# Patient Record
Sex: FEMALE | Race: OTHER | HISPANIC OR LATINO | Employment: OTHER | URBAN - METROPOLITAN AREA
[De-identification: names, ages, dates, MRNs, and addresses within clinical notes are randomized per-mention and may not be internally consistent; named-entity substitution may affect disease eponyms.]

---

## 2017-10-18 ENCOUNTER — HOSPITAL ENCOUNTER (OUTPATIENT)
Dept: RADIOLOGY | Age: 57
Discharge: HOME | End: 2017-10-18
Payer: COMMERCIAL

## 2017-10-18 DIAGNOSIS — Z12.31 ENCOUNTER FOR SCREENING MAMMOGRAM FOR MALIGNANT NEOPLASM OF BREAST: ICD-10-CM

## 2017-10-18 PROCEDURE — G0202 SCR MAMMO BI INCL CAD: HCPCS

## 2017-10-22 ENCOUNTER — APPOINTMENT (OUTPATIENT)
Dept: LAB | Age: 57
End: 2017-10-22
Payer: COMMERCIAL

## 2017-10-22 ENCOUNTER — TRANSCRIBE ORDERS (OUTPATIENT)
Dept: ADMINISTRATIVE | Age: 57
End: 2017-10-22

## 2017-10-22 DIAGNOSIS — E78.5 HYPERLIPIDEMIA, UNSPECIFIED HYPERLIPIDEMIA TYPE: Primary | ICD-10-CM

## 2017-10-22 DIAGNOSIS — E78.5 HYPERLIPIDEMIA, UNSPECIFIED HYPERLIPIDEMIA TYPE: ICD-10-CM

## 2017-10-22 DIAGNOSIS — E03.9 MYXEDEMA HEART DISEASE: ICD-10-CM

## 2017-10-22 DIAGNOSIS — E55.9 AVITAMINOSIS D: ICD-10-CM

## 2017-10-22 DIAGNOSIS — I51.9 MYXEDEMA HEART DISEASE: ICD-10-CM

## 2017-10-22 LAB
25(OH)D3 SERPL-MCNC: 37.9 NG/ML (ref 30–100)
ALBUMIN SERPL BCP-MCNC: 3.7 G/DL (ref 3.5–5)
ALP SERPL-CCNC: 60 U/L (ref 46–116)
ALT SERPL W P-5'-P-CCNC: 27 U/L (ref 12–78)
ANION GAP SERPL CALCULATED.3IONS-SCNC: 7 MMOL/L (ref 4–13)
AST SERPL W P-5'-P-CCNC: 16 U/L (ref 5–45)
BILIRUB SERPL-MCNC: 0.43 MG/DL (ref 0.2–1)
BUN SERPL-MCNC: 19 MG/DL (ref 5–25)
CALCIUM SERPL-MCNC: 8.8 MG/DL (ref 8.3–10.1)
CHLORIDE SERPL-SCNC: 108 MMOL/L (ref 100–108)
CHOLEST SERPL-MCNC: 163 MG/DL (ref 50–200)
CO2 SERPL-SCNC: 27 MMOL/L (ref 21–32)
CREAT SERPL-MCNC: 0.72 MG/DL (ref 0.6–1.3)
ERYTHROCYTE [DISTWIDTH] IN BLOOD BY AUTOMATED COUNT: 12.5 % (ref 11.6–15.1)
GFR SERPL CREATININE-BSD FRML MDRD: 93 ML/MIN/1.73SQ M
GLUCOSE P FAST SERPL-MCNC: 82 MG/DL (ref 65–99)
HCT VFR BLD AUTO: 38.5 % (ref 34.8–46.1)
HDLC SERPL-MCNC: 70 MG/DL (ref 40–60)
HGB BLD-MCNC: 13.2 G/DL (ref 11.5–15.4)
LDLC SERPL CALC-MCNC: 68 MG/DL (ref 0–100)
MCH RBC QN AUTO: 30.6 PG (ref 26.8–34.3)
MCHC RBC AUTO-ENTMCNC: 34.3 G/DL (ref 31.4–37.4)
MCV RBC AUTO: 89 FL (ref 82–98)
PLATELET # BLD AUTO: 261 THOUSANDS/UL (ref 149–390)
PMV BLD AUTO: 10.5 FL (ref 8.9–12.7)
POTASSIUM SERPL-SCNC: 4 MMOL/L (ref 3.5–5.3)
PROT SERPL-MCNC: 7.5 G/DL (ref 6.4–8.2)
RBC # BLD AUTO: 4.31 MILLION/UL (ref 3.81–5.12)
SODIUM SERPL-SCNC: 142 MMOL/L (ref 136–145)
TRIGL SERPL-MCNC: 127 MG/DL
TSH SERPL DL<=0.05 MIU/L-ACNC: 1.14 UIU/ML (ref 0.36–3.74)
VIT B12 SERPL-MCNC: 4058 PG/ML (ref 100–900)
WBC # BLD AUTO: 6.01 THOUSAND/UL (ref 4.31–10.16)

## 2017-10-22 PROCEDURE — 36415 COLL VENOUS BLD VENIPUNCTURE: CPT

## 2017-10-22 PROCEDURE — 80053 COMPREHEN METABOLIC PANEL: CPT

## 2017-10-22 PROCEDURE — 80061 LIPID PANEL: CPT

## 2017-10-22 PROCEDURE — 82607 VITAMIN B-12: CPT

## 2017-10-22 PROCEDURE — 85027 COMPLETE CBC AUTOMATED: CPT | Performed by: INTERNAL MEDICINE

## 2017-10-22 PROCEDURE — 82306 VITAMIN D 25 HYDROXY: CPT

## 2017-10-22 PROCEDURE — 84443 ASSAY THYROID STIM HORMONE: CPT

## 2017-10-26 ENCOUNTER — HOSPITAL ENCOUNTER (OUTPATIENT)
Dept: ULTRASOUND IMAGING | Facility: CLINIC | Age: 57
Discharge: HOME | End: 2017-10-26
Payer: COMMERCIAL

## 2017-10-26 ENCOUNTER — HOSPITAL ENCOUNTER (OUTPATIENT)
Dept: MAMMOGRAPHY | Facility: CLINIC | Age: 57
Discharge: HOME | End: 2017-10-26
Payer: COMMERCIAL

## 2017-10-26 DIAGNOSIS — R92.8 ABNORMAL MAMMOGRAM: ICD-10-CM

## 2017-10-26 PROCEDURE — G0279 TOMOSYNTHESIS, MAMMO: HCPCS

## 2017-10-26 PROCEDURE — G0206 DX MAMMO INCL CAD UNI: HCPCS

## 2019-02-20 ENCOUNTER — TRANSCRIBE ORDERS (OUTPATIENT)
Dept: ADMINISTRATIVE | Facility: HOSPITAL | Age: 59
End: 2019-02-20

## 2019-02-20 DIAGNOSIS — Z12.31 ENCOUNTER FOR SCREENING MAMMOGRAM FOR MALIGNANT NEOPLASM OF BREAST: Primary | ICD-10-CM

## 2019-03-13 ENCOUNTER — HOSPITAL ENCOUNTER (OUTPATIENT)
Dept: RADIOLOGY | Age: 59
Discharge: HOME | End: 2019-03-13
Payer: COMMERCIAL

## 2019-03-13 VITALS — HEIGHT: 60 IN | BODY MASS INDEX: 30.63 KG/M2 | WEIGHT: 156 LBS

## 2019-03-13 DIAGNOSIS — Z12.31 ENCOUNTER FOR SCREENING MAMMOGRAM FOR MALIGNANT NEOPLASM OF BREAST: ICD-10-CM

## 2019-03-13 PROCEDURE — 77063 BREAST TOMOSYNTHESIS BI: CPT

## 2019-03-13 PROCEDURE — 77067 SCR MAMMO BI INCL CAD: CPT

## 2020-01-17 ENCOUNTER — APPOINTMENT (OUTPATIENT)
Dept: LAB | Facility: CLINIC | Age: 60
End: 2020-01-17
Payer: COMMERCIAL

## 2020-01-17 ENCOUNTER — TRANSCRIBE ORDERS (OUTPATIENT)
Dept: LAB | Facility: CLINIC | Age: 60
End: 2020-01-17

## 2020-01-17 DIAGNOSIS — E78.5 HYPERLIPIDEMIA, UNSPECIFIED HYPERLIPIDEMIA TYPE: ICD-10-CM

## 2020-01-17 DIAGNOSIS — E55.9 AVITAMINOSIS D: ICD-10-CM

## 2020-01-17 DIAGNOSIS — I51.9 MYXEDEMA HEART DISEASE: ICD-10-CM

## 2020-01-17 DIAGNOSIS — E03.9 MYXEDEMA HEART DISEASE: Primary | ICD-10-CM

## 2020-01-17 DIAGNOSIS — G51.0 BELL'S PALSY: ICD-10-CM

## 2020-01-17 DIAGNOSIS — E03.9 MYXEDEMA HEART DISEASE: ICD-10-CM

## 2020-01-17 DIAGNOSIS — I51.9 MYXEDEMA HEART DISEASE: Primary | ICD-10-CM

## 2020-01-17 LAB
ALBUMIN SERPL BCP-MCNC: 3.6 G/DL (ref 3.5–5)
ALP SERPL-CCNC: 62 U/L (ref 46–116)
ALT SERPL W P-5'-P-CCNC: 32 U/L (ref 12–78)
ANION GAP SERPL CALCULATED.3IONS-SCNC: 4 MMOL/L (ref 4–13)
AST SERPL W P-5'-P-CCNC: 14 U/L (ref 5–45)
BILIRUB SERPL-MCNC: 0.41 MG/DL (ref 0.2–1)
BUN SERPL-MCNC: 16 MG/DL (ref 5–25)
CALCIUM SERPL-MCNC: 9.1 MG/DL (ref 8.3–10.1)
CHLORIDE SERPL-SCNC: 111 MMOL/L (ref 100–108)
CHOLEST SERPL-MCNC: 173 MG/DL (ref 50–200)
CO2 SERPL-SCNC: 27 MMOL/L (ref 21–32)
CREAT SERPL-MCNC: 0.65 MG/DL (ref 0.6–1.3)
ERYTHROCYTE [DISTWIDTH] IN BLOOD BY AUTOMATED COUNT: 12.9 % (ref 11.6–15.1)
GFR SERPL CREATININE-BSD FRML MDRD: 97 ML/MIN/1.73SQ M
GLUCOSE P FAST SERPL-MCNC: 88 MG/DL (ref 65–99)
HCT VFR BLD AUTO: 41.4 % (ref 34.8–46.1)
HDLC SERPL-MCNC: 64 MG/DL
HGB BLD-MCNC: 13.4 G/DL (ref 11.5–15.4)
LDLC SERPL CALC-MCNC: 87 MG/DL (ref 0–100)
MCH RBC QN AUTO: 30.2 PG (ref 26.8–34.3)
MCHC RBC AUTO-ENTMCNC: 32.4 G/DL (ref 31.4–37.4)
MCV RBC AUTO: 93 FL (ref 82–98)
NONHDLC SERPL-MCNC: 109 MG/DL
PLATELET # BLD AUTO: 268 THOUSANDS/UL (ref 149–390)
PMV BLD AUTO: 10.5 FL (ref 8.9–12.7)
POTASSIUM SERPL-SCNC: 4.6 MMOL/L (ref 3.5–5.3)
PROT SERPL-MCNC: 7.4 G/DL (ref 6.4–8.2)
RBC # BLD AUTO: 4.44 MILLION/UL (ref 3.81–5.12)
SODIUM SERPL-SCNC: 142 MMOL/L (ref 136–145)
TRIGL SERPL-MCNC: 112 MG/DL
TSH SERPL DL<=0.05 MIU/L-ACNC: 1.57 UIU/ML (ref 0.36–3.74)
VIT B12 SERPL-MCNC: 668 PG/ML (ref 100–900)
WBC # BLD AUTO: 5.58 THOUSAND/UL (ref 4.31–10.16)

## 2020-01-17 PROCEDURE — 85027 COMPLETE CBC AUTOMATED: CPT

## 2020-01-17 PROCEDURE — 36415 COLL VENOUS BLD VENIPUNCTURE: CPT

## 2020-01-17 PROCEDURE — 80053 COMPREHEN METABOLIC PANEL: CPT

## 2020-01-17 PROCEDURE — 82607 VITAMIN B-12: CPT

## 2020-01-17 PROCEDURE — 82652 VIT D 1 25-DIHYDROXY: CPT

## 2020-01-17 PROCEDURE — 84443 ASSAY THYROID STIM HORMONE: CPT

## 2020-01-17 PROCEDURE — 80061 LIPID PANEL: CPT

## 2020-01-21 LAB — 1,25(OH)2D3 SERPL-MCNC: 79.4 PG/ML (ref 19.9–79.3)

## 2020-01-28 ENCOUNTER — TRANSCRIBE ORDERS (OUTPATIENT)
Dept: RADIOLOGY | Facility: HOSPITAL | Age: 60
End: 2020-01-28

## 2020-01-28 ENCOUNTER — HOSPITAL ENCOUNTER (OUTPATIENT)
Dept: RADIOLOGY | Facility: HOSPITAL | Age: 60
Discharge: HOME | End: 2020-01-28
Payer: COMMERCIAL

## 2020-01-28 DIAGNOSIS — M50.91: ICD-10-CM

## 2020-01-28 DIAGNOSIS — M50.91: Primary | ICD-10-CM

## 2020-01-28 PROCEDURE — 72050 X-RAY EXAM NECK SPINE 4/5VWS: CPT

## 2020-03-17 ENCOUNTER — HOSPITAL ENCOUNTER (OUTPATIENT)
Dept: RADIOLOGY | Age: 60
Discharge: HOME | End: 2020-03-17
Payer: COMMERCIAL

## 2020-03-17 VITALS — BODY MASS INDEX: 32.59 KG/M2 | HEIGHT: 60 IN | WEIGHT: 166 LBS

## 2020-03-17 DIAGNOSIS — Z12.31 ENCOUNTER FOR SCREENING MAMMOGRAM FOR MALIGNANT NEOPLASM OF BREAST: ICD-10-CM

## 2020-03-17 PROCEDURE — 77063 BREAST TOMOSYNTHESIS BI: CPT

## 2020-03-17 PROCEDURE — 77067 SCR MAMMO BI INCL CAD: CPT

## 2020-06-24 ENCOUNTER — TRANSCRIBE ORDERS (OUTPATIENT)
Dept: ADMINISTRATIVE | Age: 60
End: 2020-06-24

## 2020-06-24 ENCOUNTER — APPOINTMENT (OUTPATIENT)
Dept: LAB | Age: 60
End: 2020-06-24
Payer: COMMERCIAL

## 2020-06-24 DIAGNOSIS — E89.41 SYMPTOMATIC POSTPROCEDURAL OVARIAN FAILURE: Primary | ICD-10-CM

## 2020-06-24 DIAGNOSIS — E89.41 SYMPTOMATIC POSTPROCEDURAL OVARIAN FAILURE: ICD-10-CM

## 2020-06-24 LAB — ESTRADIOL SERPL-MCNC: 57 PG/ML

## 2020-06-24 PROCEDURE — 82670 ASSAY OF TOTAL ESTRADIOL: CPT

## 2020-06-24 PROCEDURE — 36415 COLL VENOUS BLD VENIPUNCTURE: CPT

## 2020-06-24 PROCEDURE — 84403 ASSAY OF TOTAL TESTOSTERONE: CPT

## 2020-06-24 PROCEDURE — 84402 ASSAY OF FREE TESTOSTERONE: CPT

## 2020-06-26 LAB
TESTOST FREE SERPL-MCNC: 0.8 PG/ML (ref 0–4.2)
TESTOST SERPL-MCNC: 62 NG/DL (ref 3–41)

## 2021-01-19 ENCOUNTER — TRANSCRIBE ORDERS (OUTPATIENT)
Dept: ADMINISTRATIVE | Age: 61
End: 2021-01-19

## 2021-01-19 ENCOUNTER — LAB (OUTPATIENT)
Dept: LAB | Age: 61
End: 2021-01-19
Payer: COMMERCIAL

## 2021-01-19 DIAGNOSIS — I51.9 MYXEDEMA HEART DISEASE: Primary | ICD-10-CM

## 2021-01-19 DIAGNOSIS — E03.9 MYXEDEMA HEART DISEASE: ICD-10-CM

## 2021-01-19 DIAGNOSIS — E55.9 AVITAMINOSIS D: ICD-10-CM

## 2021-01-19 DIAGNOSIS — E78.5 HYPERLIPIDEMIA, UNSPECIFIED HYPERLIPIDEMIA TYPE: ICD-10-CM

## 2021-01-19 DIAGNOSIS — I51.9 MYXEDEMA HEART DISEASE: ICD-10-CM

## 2021-01-19 DIAGNOSIS — G51.0 BELL'S PALSY: ICD-10-CM

## 2021-01-19 DIAGNOSIS — D51.0 PERNICIOUS ANEMIA: ICD-10-CM

## 2021-01-19 DIAGNOSIS — E03.9 MYXEDEMA HEART DISEASE: Primary | ICD-10-CM

## 2021-01-19 LAB
25(OH)D3 SERPL-MCNC: 18.9 NG/ML (ref 30–100)
ALBUMIN SERPL BCP-MCNC: 3.6 G/DL (ref 3.5–5)
ALP SERPL-CCNC: 67 U/L (ref 46–116)
ALT SERPL W P-5'-P-CCNC: 24 U/L (ref 12–78)
ANION GAP SERPL CALCULATED.3IONS-SCNC: 4 MMOL/L (ref 4–13)
AST SERPL W P-5'-P-CCNC: 16 U/L (ref 5–45)
BILIRUB SERPL-MCNC: 0.44 MG/DL (ref 0.2–1)
BUN SERPL-MCNC: 15 MG/DL (ref 5–25)
CALCIUM SERPL-MCNC: 9.2 MG/DL (ref 8.3–10.1)
CHLORIDE SERPL-SCNC: 112 MMOL/L (ref 100–108)
CHOLEST SERPL-MCNC: 170 MG/DL (ref 50–200)
CO2 SERPL-SCNC: 26 MMOL/L (ref 21–32)
CREAT SERPL-MCNC: 0.66 MG/DL (ref 0.6–1.3)
ERYTHROCYTE [DISTWIDTH] IN BLOOD BY AUTOMATED COUNT: 11.9 % (ref 11.6–15.1)
GFR SERPL CREATININE-BSD FRML MDRD: 96 ML/MIN/1.73SQ M
GLUCOSE P FAST SERPL-MCNC: 83 MG/DL (ref 65–99)
HCT VFR BLD AUTO: 39.4 % (ref 34.8–46.1)
HDLC SERPL-MCNC: 56 MG/DL
HGB BLD-MCNC: 12.9 G/DL (ref 11.5–15.4)
LDLC SERPL CALC-MCNC: 81 MG/DL (ref 0–100)
MCH RBC QN AUTO: 30.8 PG (ref 26.8–34.3)
MCHC RBC AUTO-ENTMCNC: 32.7 G/DL (ref 31.4–37.4)
MCV RBC AUTO: 94 FL (ref 82–98)
NONHDLC SERPL-MCNC: 114 MG/DL
PLATELET # BLD AUTO: 245 THOUSANDS/UL (ref 149–390)
PMV BLD AUTO: 10.7 FL (ref 8.9–12.7)
POTASSIUM SERPL-SCNC: 4.4 MMOL/L (ref 3.5–5.3)
PROT SERPL-MCNC: 7.2 G/DL (ref 6.4–8.2)
RBC # BLD AUTO: 4.19 MILLION/UL (ref 3.81–5.12)
SODIUM SERPL-SCNC: 142 MMOL/L (ref 136–145)
TRIGL SERPL-MCNC: 167 MG/DL
TSH SERPL DL<=0.05 MIU/L-ACNC: 4.18 UIU/ML (ref 0.36–3.74)
VIT B12 SERPL-MCNC: 428 PG/ML (ref 100–900)
WBC # BLD AUTO: 5.47 THOUSAND/UL (ref 4.31–10.16)

## 2021-01-19 PROCEDURE — 80053 COMPREHEN METABOLIC PANEL: CPT

## 2021-01-19 PROCEDURE — 85027 COMPLETE CBC AUTOMATED: CPT

## 2021-01-19 PROCEDURE — 82607 VITAMIN B-12: CPT

## 2021-01-19 PROCEDURE — 80061 LIPID PANEL: CPT

## 2021-01-19 PROCEDURE — 82306 VITAMIN D 25 HYDROXY: CPT

## 2021-01-19 PROCEDURE — 84443 ASSAY THYROID STIM HORMONE: CPT

## 2021-03-10 DIAGNOSIS — Z23 ENCOUNTER FOR IMMUNIZATION: ICD-10-CM

## 2021-05-19 ENCOUNTER — TRANSCRIBE ORDERS (OUTPATIENT)
Dept: ADMINISTRATIVE | Age: 61
End: 2021-05-19

## 2021-05-19 ENCOUNTER — APPOINTMENT (OUTPATIENT)
Dept: LAB | Age: 61
End: 2021-05-19
Payer: COMMERCIAL

## 2021-05-19 DIAGNOSIS — E55.9 AVITAMINOSIS D: ICD-10-CM

## 2021-05-19 DIAGNOSIS — I51.9 MYXEDEMA HEART DISEASE: ICD-10-CM

## 2021-05-19 DIAGNOSIS — E03.9 MYXEDEMA HEART DISEASE: ICD-10-CM

## 2021-05-19 DIAGNOSIS — E55.9 AVITAMINOSIS D: Primary | ICD-10-CM

## 2021-05-19 LAB
25(OH)D3 SERPL-MCNC: 20.8 NG/ML (ref 30–100)
TSH SERPL DL<=0.05 MIU/L-ACNC: 2.46 UIU/ML (ref 0.36–3.74)

## 2021-05-19 PROCEDURE — 82306 VITAMIN D 25 HYDROXY: CPT

## 2021-05-19 PROCEDURE — 84443 ASSAY THYROID STIM HORMONE: CPT

## 2021-05-19 PROCEDURE — 36415 COLL VENOUS BLD VENIPUNCTURE: CPT

## 2021-07-21 ENCOUNTER — HOSPITAL ENCOUNTER (OUTPATIENT)
Dept: RADIOLOGY | Age: 61
Discharge: HOME | End: 2021-07-21
Payer: COMMERCIAL

## 2021-07-21 VITALS — HEIGHT: 60 IN | WEIGHT: 166 LBS | BODY MASS INDEX: 32.59 KG/M2

## 2021-07-21 DIAGNOSIS — Z12.31 ENCOUNTER FOR SCREENING MAMMOGRAM FOR MALIGNANT NEOPLASM OF BREAST: ICD-10-CM

## 2021-07-21 PROCEDURE — 77063 BREAST TOMOSYNTHESIS BI: CPT

## 2021-07-21 PROCEDURE — 77067 SCR MAMMO BI INCL CAD: CPT

## 2021-10-03 ENCOUNTER — APPOINTMENT (OUTPATIENT)
Dept: LAB | Age: 61
End: 2021-10-03
Payer: COMMERCIAL

## 2021-10-03 DIAGNOSIS — E03.9 ACQUIRED HYPOTHYROIDISM: ICD-10-CM

## 2021-10-03 LAB — TSH SERPL DL<=0.05 MIU/L-ACNC: 0.93 UIU/ML (ref 0.36–3.74)

## 2021-10-03 PROCEDURE — 36415 COLL VENOUS BLD VENIPUNCTURE: CPT

## 2021-10-03 PROCEDURE — 84443 ASSAY THYROID STIM HORMONE: CPT

## 2021-10-17 ENCOUNTER — APPOINTMENT (OUTPATIENT)
Dept: LAB | Age: 61
End: 2021-10-17
Payer: COMMERCIAL

## 2021-10-17 DIAGNOSIS — E89.41 SYMPTOMATIC STATES ASSOCIATED WITH ARTIFICIAL MENOPAUSE: ICD-10-CM

## 2021-10-17 LAB — ESTRADIOL SERPL-MCNC: 60 PG/ML

## 2021-10-17 PROCEDURE — 84403 ASSAY OF TOTAL TESTOSTERONE: CPT

## 2021-10-17 PROCEDURE — 84402 ASSAY OF FREE TESTOSTERONE: CPT

## 2021-10-17 PROCEDURE — 82670 ASSAY OF TOTAL ESTRADIOL: CPT

## 2021-10-17 PROCEDURE — 36415 COLL VENOUS BLD VENIPUNCTURE: CPT

## 2021-10-18 LAB
TESTOST FREE SERPL-MCNC: 0.9 PG/ML (ref 0–4.2)
TESTOST SERPL-MCNC: 52 NG/DL (ref 3–67)

## 2021-11-14 ENCOUNTER — LAB (OUTPATIENT)
Dept: LAB | Age: 61
End: 2021-11-14
Payer: COMMERCIAL

## 2021-11-14 DIAGNOSIS — Z01.818 PRE-OP TESTING: ICD-10-CM

## 2021-11-14 LAB
ANION GAP SERPL CALCULATED.3IONS-SCNC: 3 MMOL/L (ref 4–13)
BASOPHILS # BLD AUTO: 0.07 THOUSANDS/ΜL (ref 0–0.1)
BASOPHILS NFR BLD AUTO: 1 % (ref 0–1)
BUN SERPL-MCNC: 15 MG/DL (ref 5–25)
CALCIUM SERPL-MCNC: 9.1 MG/DL (ref 8.3–10.1)
CHLORIDE SERPL-SCNC: 107 MMOL/L (ref 100–108)
CO2 SERPL-SCNC: 27 MMOL/L (ref 21–32)
CREAT SERPL-MCNC: 0.69 MG/DL (ref 0.6–1.3)
EOSINOPHIL # BLD AUTO: 0.19 THOUSAND/ΜL (ref 0–0.61)
EOSINOPHIL NFR BLD AUTO: 3 % (ref 0–6)
ERYTHROCYTE [DISTWIDTH] IN BLOOD BY AUTOMATED COUNT: 11.9 % (ref 11.6–15.1)
GFR SERPL CREATININE-BSD FRML MDRD: 94 ML/MIN/1.73SQ M
GLUCOSE P FAST SERPL-MCNC: 86 MG/DL (ref 65–99)
HCT VFR BLD AUTO: 41.3 % (ref 34.8–46.1)
HGB BLD-MCNC: 13.6 G/DL (ref 11.5–15.4)
IMM GRANULOCYTES # BLD AUTO: 0.03 THOUSAND/UL (ref 0–0.2)
IMM GRANULOCYTES NFR BLD AUTO: 1 % (ref 0–2)
LYMPHOCYTES # BLD AUTO: 1.53 THOUSANDS/ΜL (ref 0.6–4.47)
LYMPHOCYTES NFR BLD AUTO: 25 % (ref 14–44)
MCH RBC QN AUTO: 31.3 PG (ref 26.8–34.3)
MCHC RBC AUTO-ENTMCNC: 32.9 G/DL (ref 31.4–37.4)
MCV RBC AUTO: 95 FL (ref 82–98)
MONOCYTES # BLD AUTO: 0.68 THOUSAND/ΜL (ref 0.17–1.22)
MONOCYTES NFR BLD AUTO: 11 % (ref 4–12)
NEUTROPHILS # BLD AUTO: 3.58 THOUSANDS/ΜL (ref 1.85–7.62)
NEUTS SEG NFR BLD AUTO: 59 % (ref 43–75)
NRBC BLD AUTO-RTO: 0 /100 WBCS
PLATELET # BLD AUTO: 299 THOUSANDS/UL (ref 149–390)
PMV BLD AUTO: 10.5 FL (ref 8.9–12.7)
POTASSIUM SERPL-SCNC: 5.1 MMOL/L (ref 3.5–5.3)
RBC # BLD AUTO: 4.35 MILLION/UL (ref 3.81–5.12)
SODIUM SERPL-SCNC: 137 MMOL/L (ref 136–145)
WBC # BLD AUTO: 6.08 THOUSAND/UL (ref 4.31–10.16)

## 2021-11-14 PROCEDURE — 80048 BASIC METABOLIC PNL TOTAL CA: CPT

## 2021-11-14 PROCEDURE — 93005 ELECTROCARDIOGRAM TRACING: CPT

## 2021-11-14 PROCEDURE — 36415 COLL VENOUS BLD VENIPUNCTURE: CPT

## 2021-11-14 PROCEDURE — 85025 COMPLETE CBC W/AUTO DIFF WBC: CPT

## 2021-11-15 LAB
ATRIAL RATE: 62 BPM
P AXIS: 43 DEGREES
PR INTERVAL: 178 MS
QRS AXIS: 40 DEGREES
QRSD INTERVAL: 82 MS
QT INTERVAL: 438 MS
QTC INTERVAL: 444 MS
T WAVE AXIS: 2 DEGREES
VENTRICULAR RATE: 62 BPM

## 2021-11-15 PROCEDURE — 93010 ELECTROCARDIOGRAM REPORT: CPT | Performed by: INTERNAL MEDICINE

## 2021-11-30 ENCOUNTER — LAB REQUISITION (OUTPATIENT)
Dept: LAB | Facility: HOSPITAL | Age: 61
End: 2021-11-30
Payer: COMMERCIAL

## 2021-11-30 DIAGNOSIS — J34.89 OTHER SPECIFIED DISORDERS OF NOSE AND NASAL SINUSES: ICD-10-CM

## 2021-11-30 PROCEDURE — 88305 TISSUE EXAM BY PATHOLOGIST: CPT | Performed by: PATHOLOGY

## 2022-01-26 ENCOUNTER — OFFICE VISIT (OUTPATIENT)
Dept: PODIATRY | Facility: CLINIC | Age: 62
End: 2022-01-26
Payer: COMMERCIAL

## 2022-01-26 VITALS
BODY MASS INDEX: 32.22 KG/M2 | SYSTOLIC BLOOD PRESSURE: 145 MMHG | HEIGHT: 60 IN | DIASTOLIC BLOOD PRESSURE: 87 MMHG | HEART RATE: 86 BPM

## 2022-01-26 DIAGNOSIS — M77.42 METATARSALGIA OF BOTH FEET: ICD-10-CM

## 2022-01-26 DIAGNOSIS — M77.41 METATARSALGIA OF BOTH FEET: ICD-10-CM

## 2022-01-26 DIAGNOSIS — M21.42 PES PLANUS OF BOTH FEET: Primary | ICD-10-CM

## 2022-01-26 DIAGNOSIS — M21.41 PES PLANUS OF BOTH FEET: Primary | ICD-10-CM

## 2022-01-26 DIAGNOSIS — M21.962 FOOT DEFORMITY, BILATERAL: ICD-10-CM

## 2022-01-26 DIAGNOSIS — M21.961 FOOT DEFORMITY, BILATERAL: ICD-10-CM

## 2022-01-26 PROCEDURE — 99243 OFF/OP CNSLTJ NEW/EST LOW 30: CPT | Performed by: PODIATRIST

## 2022-01-26 RX ORDER — GLUCOSAMINE/D3/BOSWELLIA SERRA 1500MG-400
TABLET ORAL
COMMUNITY
End: 2022-06-23 | Stop reason: ALTCHOICE

## 2022-01-26 RX ORDER — CETIRIZINE HYDROCHLORIDE 10 MG/1
TABLET ORAL
COMMUNITY
End: 2022-06-23 | Stop reason: ALTCHOICE

## 2022-01-26 RX ORDER — MULTIVIT WITH MINERALS/LUTEIN
TABLET ORAL
COMMUNITY
End: 2022-06-23 | Stop reason: ALTCHOICE

## 2022-01-26 NOTE — PROGRESS NOTES
Assessment/Plan:         Diagnoses and all orders for this visit:    Pes planus of both feet  -     Ambulatory referral to Physical Therapy; Future    Metatarsalgia of both feet  -     Ambulatory referral to Physical Therapy; Future    Foot deformity, bilateral  -     Ambulatory referral to Physical Therapy; Future    Other orders  -     cetirizine (Allergy, Cetirizine,) 10 mg tablet; cetirizine 10 mg tablet   TAKE 1 TABLET BY MOUTH ONCE DAILY FOR 30 DAYS  -     Ascorbic Acid (vitamin C) 1000 MG tablet  -     Biotin 21274 MCG TABS      Diagnosis and options discussed with patient  Patient agreeable to the plan as stated below      Patient has progressive pes planovalgus right worse than left  Most of her pain is in the midfoot and forefoot  At this point she has no posterior tibial tendon pain or weakness  I suggested a custom orthotic in her shoe to better externally supported her foot deformity  I think a lot of aching pain she gets throughout her feet is from the overpronation and flatfoot deformity  Prescription given to get custom-molded orthotics  I would like to see her after wearing them for 3 months  Subjective:      Patient ID: Esaw Najjar is a 64 y o  female  Patient presents with pain for years  In the last year it has gotten much worse  The right foot is worse than the left  Sometimes she can barely stand  There is swelling on and off  She wears an arch support band which sort of helps  She gets severe pain in her medial heel and arch, across the base of the toes, along the 5th metatarsal, basically everywhere  The left foot pain is in the same locations but much less severe  She is an active walker but this pain is stopping her from doing activity  The following portions of the patient's history were reviewed and updated as appropriate:   She  has a past medical history of Cervical cancer ([address] ) and GERD (gastroesophageal reflux disease)    She There are no problems to display for this patient  She  has a past surgical history that includes Hysterectomy; Oophorectomy (Bilateral); Breast biopsy (Right);  section (1986); Urethral sling (2010); Gastric bypass (2011); and Nose surgery  Her family history includes Breast cancer in her maternal grandmother; No Known Problems in her daughter, father, maternal aunt, maternal aunt, maternal aunt, maternal aunt, maternal aunt, maternal aunt, maternal aunt, maternal aunt, maternal aunt, maternal grandfather, mother, paternal aunt, paternal aunt, paternal aunt, paternal aunt, paternal aunt, paternal aunt, paternal grandfather, paternal grandmother, and sister; Thyroid disease in her family  She  reports that she has never smoked  She has never used smokeless tobacco  No history on file for alcohol use and drug use  Current Outpatient Medications   Medication Sig Dispense Refill    Ascorbic Acid (vitamin C) 1000 MG tablet       B Complex-C (SUPER B COMPLEX PO)       Biotin 88457 MCG TABS       cetirizine (Allergy, Cetirizine,) 10 mg tablet cetirizine 10 mg tablet   TAKE 1 TABLET BY MOUTH ONCE DAILY FOR 30 DAYS      cyanocobalamin (VITAMIN B-12) 1000 MCG tablet Take 1 tablet by mouth      Estradiol 6 MG PLLT       levocetirizine (Xyzal) 5 MG tablet Daily      magnesium oxide (MAG-OX) 400 mg tablet Take 400 mg by mouth      Multiple Vitamins-Minerals (ONE-A-DAY 50 PLUS PO)       Testosterone 50 MG PLLT       famotidine (PEPCID) 10 mg tablet famotidine   1 tablet by mouth before breakfast      famotidine (PEPCID) 20 mg tablet famotidine 20 mg tablet      levothyroxine 75 mcg tablet Take 75 mcg by mouth daily       No current facility-administered medications for this visit       Current Outpatient Medications on File Prior to Visit   Medication Sig    Ascorbic Acid (vitamin C) 1000 MG tablet     B Complex-C (SUPER B COMPLEX PO)     Biotin 45792 MCG TABS     cetirizine (Allergy, Cetirizine,) 10 mg tablet cetirizine 10 mg tablet   TAKE 1 TABLET BY MOUTH ONCE DAILY FOR 30 DAYS    cyanocobalamin (VITAMIN B-12) 1000 MCG tablet Take 1 tablet by mouth    Estradiol 6 MG PLLT     levocetirizine (Xyzal) 5 MG tablet Daily    magnesium oxide (MAG-OX) 400 mg tablet Take 400 mg by mouth    Multiple Vitamins-Minerals (ONE-A-DAY 50 PLUS PO)     Testosterone 50 MG PLLT     famotidine (PEPCID) 10 mg tablet famotidine   1 tablet by mouth before breakfast    famotidine (PEPCID) 20 mg tablet famotidine 20 mg tablet    levothyroxine 75 mcg tablet Take 75 mcg by mouth daily     No current facility-administered medications on file prior to visit  She is allergic to latex       Review of Systems   Constitutional: Negative  HENT: Negative  Respiratory: Negative  Gastrointestinal: Negative  Musculoskeletal: Positive for arthralgias, gait problem and joint swelling  Skin: Negative for color change and wound  Neurological: Negative for weakness, light-headedness, numbness and headaches  Psychiatric/Behavioral: The patient is not nervous/anxious  Objective:      /87   Pulse 86   Ht 5' (1 524 m) Comment: verbal  BMI 32 22 kg/m²          Physical Exam    Vitals reviewed    Constitutional: Patient is not distressed  Patient is well developed  Patient is obese  Vascular: Dorsalis pedis and posterior tibial pulses palpable  Capillary refill time within normal limits to all digits  No erythema  No edema  No significant varicosities  Dermatology: No rash  No open lesions  Present pedal hair  Skin has healthy turgor  Musculoskeletal:   Right lower legs and the Etelvina Hem shows significant collapsing pes planovalgus with calcaneal resting stance position of 7-10 degrees  She is able to perform single heel raise without posterior tibial tendon pain or dysfunction  She has over pronated during gait        On palpation she has tenderness throughout the medial arch but not plantar fascia insertion  She has midfoot and metatarsophalangeal joint dorsal tenderness  There is pain along the 5th metatarsal and styloid  No peroneal weakness  No extensor tendon weakness  Normal Achilles strength  The right is more significant than the left both in deformity and symptoms pain  Neurological: Monofilament sensation is intact  Vibratory sensation is intact  Achilles reflex is normal    Proprioception is normal    Respiratory: Normal respiratory effort, no distress    Psych: Patient is AAOx3  Normal mood       Lymphatic: nonpalpable popliteal lymph nodes  Nonpalpable groin lymph nodes

## 2022-01-26 NOTE — LETTER
January 26, 2022     [facility] [address]  [place] [address]    Patient: Caprice Born   YOB: 1960   Date of Visit: 1/26/2022       Dear Dr Levi Newsome: Thank you for referring Caprice Born to me for evaluation  Below are my notes for this consultation  If you have questions, please do not hesitate to call me  I look forward to following your patient along with you  Sincerely,        Colby Rodriguez DPM        CC: No Recipients  Sameer Mayes  1/26/2022 11:49 AM  Signed  Assessment/Plan:         Diagnoses and all orders for this visit:    Pes planus of both feet  -     Ambulatory referral to Physical Therapy; Future    Metatarsalgia of both feet  -     Ambulatory referral to Physical Therapy; Future    Foot deformity, bilateral  -     Ambulatory referral to Physical Therapy; Future    Other orders  -     cetirizine (Allergy, Cetirizine,) 10 mg tablet; cetirizine 10 mg tablet   TAKE 1 TABLET BY MOUTH ONCE DAILY FOR 30 DAYS  -     Ascorbic Acid (vitamin C) 1000 MG tablet  -     Biotin 02621 MCG TABS      Diagnosis and options discussed with patient  Patient agreeable to the plan as stated below      Patient has progressive pes planovalgus right worse than left  Most of her pain is in the midfoot and forefoot  At this point she has no posterior tibial tendon pain or weakness  I suggested a custom orthotic in her shoe to better externally supported her foot deformity  I think a lot of aching pain she gets throughout her feet is from the overpronation and flatfoot deformity  Prescription given to get custom-molded orthotics  I would like to see her after wearing them for 3 months  Subjective:      Patient ID: Caprice Born is a 64 y o  female  Patient presents with pain for years  In the last year it has gotten much worse  The right foot is worse than the left  Sometimes she can barely stand  There is swelling on and off   She wears an arch support band which sort of helps  She gets severe pain in her medial heel and arch, across the base of the toes, along the 5th metatarsal, basically everywhere  The left foot pain is in the same locations but much less severe  She is an active walker but this pain is stopping her from doing activity  The following portions of the patient's history were reviewed and updated as appropriate:   She  has a past medical history of Cervical cancer ([address] ) and GERD (gastroesophageal reflux disease)  She There are no problems to display for this patient  She  has a past surgical history that includes Hysterectomy; Oophorectomy (Bilateral); Breast biopsy (Right);  section (1986); Urethral sling (2010); Gastric bypass (2011); and Nose surgery  Her family history includes Breast cancer in her maternal grandmother; No Known Problems in her daughter, father, maternal aunt, maternal aunt, maternal aunt, maternal aunt, maternal aunt, maternal aunt, maternal aunt, maternal aunt, maternal aunt, maternal grandfather, mother, paternal aunt, paternal aunt, paternal aunt, paternal aunt, paternal aunt, paternal aunt, paternal grandfather, paternal grandmother, and sister; Thyroid disease in her family  She  reports that she has never smoked  She has never used smokeless tobacco  No history on file for alcohol use and drug use    Current Outpatient Medications   Medication Sig Dispense Refill    Ascorbic Acid (vitamin C) 1000 MG tablet       B Complex-C (SUPER B COMPLEX PO)       Biotin 78391 MCG TABS       cetirizine (Allergy, Cetirizine,) 10 mg tablet cetirizine 10 mg tablet   TAKE 1 TABLET BY MOUTH ONCE DAILY FOR 30 DAYS      cyanocobalamin (VITAMIN B-12) 1000 MCG tablet Take 1 tablet by mouth      Estradiol 6 MG PLLT       levocetirizine (Xyzal) 5 MG tablet Daily      magnesium oxide (MAG-OX) 400 mg tablet Take 400 mg by mouth      Multiple Vitamins-Minerals (ONE-A-DAY 50 PLUS PO)       Testosterone 50 MG PLLT       famotidine (PEPCID) 10 mg tablet famotidine   1 tablet by mouth before breakfast      famotidine (PEPCID) 20 mg tablet famotidine 20 mg tablet      levothyroxine 75 mcg tablet Take 75 mcg by mouth daily       No current facility-administered medications for this visit  Current Outpatient Medications on File Prior to Visit   Medication Sig    Ascorbic Acid (vitamin C) 1000 MG tablet     B Complex-C (SUPER B COMPLEX PO)     Biotin 13604 MCG TABS     cetirizine (Allergy, Cetirizine,) 10 mg tablet cetirizine 10 mg tablet   TAKE 1 TABLET BY MOUTH ONCE DAILY FOR 30 DAYS    cyanocobalamin (VITAMIN B-12) 1000 MCG tablet Take 1 tablet by mouth    Estradiol 6 MG PLLT     levocetirizine (Xyzal) 5 MG tablet Daily    magnesium oxide (MAG-OX) 400 mg tablet Take 400 mg by mouth    Multiple Vitamins-Minerals (ONE-A-DAY 50 PLUS PO)     Testosterone 50 MG PLLT     famotidine (PEPCID) 10 mg tablet famotidine   1 tablet by mouth before breakfast    famotidine (PEPCID) 20 mg tablet famotidine 20 mg tablet    levothyroxine 75 mcg tablet Take 75 mcg by mouth daily     No current facility-administered medications on file prior to visit  She is allergic to latex       Review of Systems   Constitutional: Negative  HENT: Negative  Respiratory: Negative  Gastrointestinal: Negative  Musculoskeletal: Positive for arthralgias, gait problem and joint swelling  Skin: Negative for color change and wound  Neurological: Negative for weakness, light-headedness, numbness and headaches  Psychiatric/Behavioral: The patient is not nervous/anxious  Objective:      /87   Pulse 86   Ht 5' (1 524 m) Comment: verbal  BMI 32 22 kg/m²          Physical Exam    Vitals reviewed    Constitutional: Patient is not distressed  Patient is well developed  Patient is obese  Vascular: Dorsalis pedis and posterior tibial pulses palpable     Capillary refill time within normal limits to all digits  No erythema  No edema  No significant varicosities  Dermatology: No rash  No open lesions  Present pedal hair  Skin has healthy turgor  Musculoskeletal:   Right lower legs and the [place]e Gums shows significant collapsing pes planovalgus with calcaneal resting stance position of 7-10 degrees  She is able to perform single heel raise without posterior tibial tendon pain or dysfunction  She has over pronated during gait  On palpation she has tenderness throughout the medial arch but not plantar fascia insertion  She has midfoot and metatarsophalangeal joint dorsal tenderness  There is pain along the 5th metatarsal and styloid  No peroneal weakness  No extensor tendon weakness  Normal Achilles strength  The right is more significant than the left both in deformity and symptoms pain  Neurological: Monofilament sensation is intact  Vibratory sensation is intact  Achilles reflex is normal    Proprioception is normal    Respiratory: Normal respiratory effort, no distress    Psych: Patient is AAOx3  Normal mood       Lymphatic: nonpalpable popliteal lymph nodes  Nonpalpable groin lymph nodes

## 2022-02-07 ENCOUNTER — EVALUATION (OUTPATIENT)
Dept: PHYSICAL THERAPY | Facility: CLINIC | Age: 62
End: 2022-02-07
Payer: COMMERCIAL

## 2022-02-07 DIAGNOSIS — M21.961 FOOT DEFORMITY, BILATERAL: ICD-10-CM

## 2022-02-07 DIAGNOSIS — M21.41 PES PLANUS OF BOTH FEET: ICD-10-CM

## 2022-02-07 DIAGNOSIS — M21.962 FOOT DEFORMITY, BILATERAL: ICD-10-CM

## 2022-02-07 DIAGNOSIS — M21.42 PES PLANUS OF BOTH FEET: ICD-10-CM

## 2022-02-07 DIAGNOSIS — M77.41 METATARSALGIA OF BOTH FEET: ICD-10-CM

## 2022-02-07 DIAGNOSIS — M77.42 METATARSALGIA OF BOTH FEET: ICD-10-CM

## 2022-02-07 PROCEDURE — 97161 PT EVAL LOW COMPLEX 20 MIN: CPT | Performed by: PHYSICAL THERAPIST

## 2022-02-07 NOTE — PROGRESS NOTES
PT Evaluation     Today's date: 2022  Patient name: Nnamdi Aceves  : 1960  MRN: [id number]  Referring provider: ALINE Livingston  Dx:   Encounter Diagnosis     ICD-10-CM    1  Pes planus of both feet  M21 41 Ambulatory referral to Physical Therapy    M21 42    2  Metatarsalgia of both feet  M77 41 Ambulatory referral to Physical Therapy    M77 42    3  Foot deformity, bilateral  M21 961 Ambulatory referral to Physical Therapy    M21 962                   Assessment/Plan  Pt should benefit from custom orthotics  Goals:  Fit orthotics  IP in break in period  Assure good shoe fit    Return for one more visit in 2-4 weeks to fit orthotics  Subjective Evaluation    History of Present Illness  Mechanism of injury: Pt presents for custom orthotics due to bilat foot pain (R>L)  Pain is in MLA, sinus tarsi and lat border of midfoot  She is a retired principal   She started walking more once she retired and noticed increased foot pain  She stopped walking for exercise about 5 mo  Ago which helped to decrease her symptoms but she reported weight gain    She has worn OTC inserts and tries to purchase supportive sneakers for walking    Pain  Current pain ratin  At best pain ratin  At worst pain ratin    Patient Goals  Patient goals for therapy: decreased pain          Objective  Mod pes planus L > R  Mild hallux valgus L > R  RCSP 5 deg ev bilat    Gait:  R ER  Moderated midfoot collapse  Minimal calcaneal eversion    Neutral FF, PF'd 1st ray L    Scanned for custom orthotics     Precautions: none      Manuals                                                                 Neuro Re-Ed                                                                                                        Ther Ex                                                                                                                     Ther Activity                                       Gait Training Modalities

## 2022-02-10 ENCOUNTER — OFFICE VISIT (OUTPATIENT)
Dept: BARIATRICS | Facility: CLINIC | Age: 62
End: 2022-02-10
Payer: COMMERCIAL

## 2022-02-10 VITALS
RESPIRATION RATE: 16 BRPM | HEIGHT: 61 IN | WEIGHT: 170.5 LBS | SYSTOLIC BLOOD PRESSURE: 125 MMHG | HEART RATE: 83 BPM | DIASTOLIC BLOOD PRESSURE: 83 MMHG | TEMPERATURE: 97.3 F | BODY MASS INDEX: 32.19 KG/M2

## 2022-02-10 DIAGNOSIS — K91.2 POSTSURGICAL MALABSORPTION: ICD-10-CM

## 2022-02-10 DIAGNOSIS — E66.9 OBESITY, CLASS I, BMI 30-34.9: ICD-10-CM

## 2022-02-10 DIAGNOSIS — Z98.84 BARIATRIC SURGERY STATUS: ICD-10-CM

## 2022-02-10 DIAGNOSIS — Z48.815 ENCOUNTER FOR SURGICAL AFTERCARE FOLLOWING SURGERY OF DIGESTIVE SYSTEM: Primary | ICD-10-CM

## 2022-02-10 DIAGNOSIS — E03.9 HYPOTHYROIDISM: ICD-10-CM

## 2022-02-10 PROCEDURE — 99203 OFFICE O/P NEW LOW 30 MIN: CPT | Performed by: PHYSICIAN ASSISTANT

## 2022-02-10 RX ORDER — DOXYCYCLINE HYCLATE 50 MG/1
CAPSULE, GELATIN COATED ORAL
COMMUNITY
End: 2022-06-23 | Stop reason: ALTCHOICE

## 2022-02-10 RX ORDER — TESTOSTERONE 50 MG
PELLET (EA) IMPLANTATION
COMMUNITY
End: 2022-06-23 | Stop reason: ALTCHOICE

## 2022-02-10 RX ORDER — LEVOTHYROXINE SODIUM 112 MCG
TABLET ORAL
COMMUNITY
Start: 2022-02-07

## 2022-02-10 NOTE — PROGRESS NOTES
Assessment/Plan:     Patient ID: Tiffanie Alvarez is a 64 y o  female  Bariatric Surgery Status    -s/p Alex-En-Y Gastric Bypass with Dr Nikunj Holloway in 2011  Presents to the office today for OD annual follow up  Overall doing fair  States that since surgery has had dumping almost daily  She states she was seen by [facility] and underwent EGD in 2019 which showed generally unremarkable bypass  She states she also developed lactose intolerance and intolerance to many other foods- develops uncomfortable bloating feeling and diarrhea mostly with pastas, breads, dairy, veggies, etc  She is currently on pepcid 20 mg BID without much relief  Despite this she also continue to gain weight  Her current weight is 170 lbs up from her HORTENSIA in the 150s  She has not had any repeat EGDs since 2019 despite continuation of above symptoms  She has not had follow up with GI in several months  We discussed options of next step and discussed repeating her EGD with one of our surgeon and seeing him after for follow up to discuss her symptoms and possible next steps  If there is no indication of revision surgery we also discussed option of [facility] her to [facility] for further help with weight los  s     · Continued/Maintain healthy weight loss with good nutrition intakes  · Adequate hydration with at least 64oz  fluid intake  · Follow diet as discussed  · Follow vitamin and mineral recommendations as reviewed with you  · Exercise as tolerated  · Colonoscopy referral made: utd, goes every 3 years   · Mamomgram: utd    · Follow-up after EGD with surgeon  We kindly ask that your arrive 15 minutes before your scheduled appointment time with your provider to allow our staff to room you, get your vital signs and update your chart  · Get lab work  Please call the office if you need a script  It is recommended to check with your insurance BEFORE getting labs done to make sure they are covered by your policy        · Call our office if you have any problems with abdominal pain especially associated with fever, chills, nausea, vomiting or any other concerns  · All  Post-bariatric surgery patients should be aware that very small quantities of any alcohol can cause impairment and it is very possible not to feel the effect  The effect can be in the system for several hours  It is also a stomach irritant  · It is advised to AVOID alcohol, Nonsteroidal antiinflammatory drugs (NSAIDS) and nicotine of all forms   Any of these can cause stomach irritation/pain  · Discussed the effects of alcohol on a bariatric patient and the increased impairment risk  · Keep up the good work! Postsurgical Malabsorption   -At risk for malabsorption of vitamins/minerals secondary to malabsorption and restriction of intake from bariatric surgery  -Currently taking adequate postop bariatric surgery vitamin supplementation  -Next set of bariatric labs ordered for approximately 2 weeks  -Patient received education about the importance of adhering to a lifelong supplementation regimen to avoid vitamin/mineral deficiencies      Diagnoses and all orders for this visit:    Encounter for surgical aftercare following surgery of digestive system  -     Zinc; Future  -     Vitamin D 25 hydroxy; Future  -     Vitamin B12; Future  -     Vitamin B1, whole blood; Future  -     Vitamin A; Future  -     PTH, intact; Future  -     CBC and Platelet; Future  -     Comprehensive metabolic panel; Future  -     Ferritin; Future  -     Folate; Future  -     Iron Saturation %; Future    Bariatric surgery status  -     Zinc; Future  -     Vitamin D 25 hydroxy; Future  -     Vitamin B12; Future  -     Vitamin B1, whole blood; Future  -     Vitamin A; Future  -     PTH, intact; Future  -     CBC and Platelet; Future  -     Comprehensive metabolic panel; Future  -     Ferritin; Future  -     Folate; Future  -     Iron Saturation %;  Future    Postsurgical malabsorption  -     Zinc; Future  -     Vitamin D 25 hydroxy; Future  -     Vitamin B12; Future  -     Vitamin B1, whole blood; Future  -     Vitamin A; Future  -     PTH, intact; Future  -     CBC and Platelet; Future  -     Comprehensive metabolic panel; Future  -     Ferritin; Future  -     Folate; Future  -     Iron Saturation %; Future    Obesity, Class I, BMI 30-34 9  -     Zinc; Future  -     Vitamin D 25 hydroxy; Future  -     Vitamin B12; Future  -     Vitamin B1, whole blood; Future  -     Vitamin A; Future  -     PTH, intact; Future  -     CBC and Platelet; Future  -     Comprehensive metabolic panel; Future  -     Ferritin; Future  -     Folate; Future  -     Iron Saturation %; Future    Hypothyroidism  -     Zinc; Future  -     Vitamin D 25 hydroxy; Future  -     Vitamin B12; Future  -     Vitamin B1, whole blood; Future  -     Vitamin A; Future  -     PTH, intact; Future  -     CBC and Platelet; Future  -     Comprehensive metabolic panel; Future  -     Ferritin; Future  -     Folate; Future  -     Iron Saturation %; Future    Other orders  -     Testosterone 50 MG PLLT; COAST - testosterone 50mg pellet   testosterone 50mg pellet   one pellet  -     ferrous gluconate (FERGON) 324 mg tablet; ferrous gluconate 324 mg (38 mg iron) tablet (Patient not taking: Reported on 2/10/2022)  -     Synthroid 112 MCG tablet         Subjective:      Patient ID: Erin Dillon is a 64 y o  female  -s/p Alex-En-Y Gastric Bypass with Dr Gena Marie in 2011  Presents to the office today for OD annual follow up  Initial: 215  Current:170  EWL: (Weight loss is ahead of schedule at this post surgical period )  Giles: 150s  Current BMI is Body mass index is 32 75 kg/m²      · Tolerating a regular diet-yes  · Eating at least 60 grams of protein per day-yes  · Following 30/60 minute rule with liquids-mostly yes   · Drinking at least 64 ounces of fluid per day-mostly water   · Drinking carbonated beverages-no  · Sufficient exercise-not currently due to joint pains   · Using NSAIDs regularly-no  · Using nicotine-no  · Using alcohol-no  · Supplements: bitoin + vit c + b complex + b12 + one a day mvi + vit d       The following portions of the patient's history were reviewed and updated as appropriate: allergies, current medications, past family history, past medical history, past social history, past surgical history and problem list     Review of Systems   Constitutional: Negative  Respiratory: Negative  Cardiovascular: Negative  Gastrointestinal: Positive for diarrhea  Negative for abdominal pain, constipation, nausea and vomiting  Musculoskeletal: Positive for arthralgias (baseline )  Neurological: Negative  Psychiatric/Behavioral: Negative  Objective:    /83 (BP Location: Left arm, Patient Position: Sitting, Cuff Size: Large)   Pulse 83   Temp (!) 97 3 °F (36 3 °C) (Tympanic)   Resp 16   Ht 5' 0 5" (1 537 m)   Wt 77 3 kg (170 lb 8 oz)   BMI 32 75 kg/m²      Physical Exam  Vitals and nursing note reviewed  Constitutional:       Appearance: Normal appearance  She is obese  HENT:      Head: Normocephalic and atraumatic  Eyes:      Extraocular Movements: Extraocular movements intact  Pupils: Pupils are equal, round, and reactive to light  Cardiovascular:      Rate and Rhythm: Normal rate  Pulmonary:      Effort: Pulmonary effort is normal    Musculoskeletal:         General: Normal range of motion  Cervical back: Normal range of motion  Skin:     General: Skin is warm and dry  Neurological:      General: No focal deficit present  Mental Status: She is alert and oriented to person, place, and time     Psychiatric:         Mood and Affect: Mood normal

## 2022-02-10 NOTE — PATIENT INSTRUCTIONS
· Follow-up after EGD with surgeon  We kindly ask that your arrive 15 minutes before your scheduled appointment time with your provider to allow our staff to room you, get your vital signs and update your chart  · Get lab work  Please call the office if you need a script  It is recommended to check with your insurance BEFORE getting labs done to make sure they are covered by your policy  · Call our office if you have any problems with abdominal pain especially associated with fever, chills, nausea, vomiting or any other concerns  · All  Post-bariatric surgery patients should be aware that very small quantities of any alcohol can cause impairment and it is very possible not to feel the effect  The effect can be in the system for several hours  It is also a stomach irritant  · It is advised to AVOID alcohol, Nonsteroidal antiinflammatory drugs (NSAIDS) and nicotine of all forms   Any of these can cause stomach irritation/pain  · Discussed the effects of alcohol on a bariatric patient and the increased impairment risk  · Keep up the good work!

## 2022-02-15 ENCOUNTER — PREP FOR PROCEDURE (OUTPATIENT)
Dept: BARIATRICS | Facility: CLINIC | Age: 62
End: 2022-02-15

## 2022-02-15 DIAGNOSIS — Z98.84 BARIATRIC SURGERY STATUS: Primary | ICD-10-CM

## 2022-03-11 ENCOUNTER — OFFICE VISIT (OUTPATIENT)
Dept: PHYSICAL THERAPY | Facility: CLINIC | Age: 62
End: 2022-03-11
Payer: COMMERCIAL

## 2022-03-11 DIAGNOSIS — M21.41 PES PLANUS OF BOTH FEET: Primary | ICD-10-CM

## 2022-03-11 DIAGNOSIS — M21.42 PES PLANUS OF BOTH FEET: Primary | ICD-10-CM

## 2022-03-11 PROCEDURE — L3010 FOOT LONGITUDINAL ARCH SUPPO: HCPCS | Performed by: PHYSICAL THERAPIST

## 2022-03-21 ENCOUNTER — APPOINTMENT (OUTPATIENT)
Dept: LAB | Age: 62
End: 2022-03-21
Payer: COMMERCIAL

## 2022-03-21 DIAGNOSIS — Z48.815 ENCOUNTER FOR SURGICAL AFTERCARE FOLLOWING SURGERY OF DIGESTIVE SYSTEM: ICD-10-CM

## 2022-03-21 DIAGNOSIS — Z98.84 BARIATRIC SURGERY STATUS: ICD-10-CM

## 2022-03-21 DIAGNOSIS — E66.9 OBESITY, CLASS I, BMI 30-34.9: ICD-10-CM

## 2022-03-21 DIAGNOSIS — E03.9 HYPOTHYROIDISM: ICD-10-CM

## 2022-03-21 DIAGNOSIS — K91.2 POSTSURGICAL MALABSORPTION: ICD-10-CM

## 2022-03-21 LAB
25(OH)D3 SERPL-MCNC: 23.2 NG/ML (ref 30–100)
ALBUMIN SERPL BCP-MCNC: 3.5 G/DL (ref 3.5–5)
ALP SERPL-CCNC: 65 U/L (ref 46–116)
ALT SERPL W P-5'-P-CCNC: 25 U/L (ref 12–78)
ANION GAP SERPL CALCULATED.3IONS-SCNC: 6 MMOL/L (ref 4–13)
AST SERPL W P-5'-P-CCNC: 18 U/L (ref 5–45)
BILIRUB SERPL-MCNC: 0.48 MG/DL (ref 0.2–1)
BUN SERPL-MCNC: 13 MG/DL (ref 5–25)
CALCIUM SERPL-MCNC: 8.8 MG/DL (ref 8.3–10.1)
CHLORIDE SERPL-SCNC: 108 MMOL/L (ref 100–108)
CO2 SERPL-SCNC: 25 MMOL/L (ref 21–32)
CREAT SERPL-MCNC: 0.76 MG/DL (ref 0.6–1.3)
ERYTHROCYTE [DISTWIDTH] IN BLOOD BY AUTOMATED COUNT: 12.1 % (ref 11.6–15.1)
FERRITIN SERPL-MCNC: 31 NG/ML (ref 8–388)
FOLATE SERPL-MCNC: 11.3 NG/ML (ref 3.1–17.5)
GFR SERPL CREATININE-BSD FRML MDRD: 84 ML/MIN/1.73SQ M
GLUCOSE P FAST SERPL-MCNC: 86 MG/DL (ref 65–99)
HCT VFR BLD AUTO: 39.4 % (ref 34.8–46.1)
HGB BLD-MCNC: 13.3 G/DL (ref 11.5–15.4)
IRON SATN MFR SERPL: 39 % (ref 15–50)
IRON SERPL-MCNC: 143 UG/DL (ref 50–170)
MCH RBC QN AUTO: 30.4 PG (ref 26.8–34.3)
MCHC RBC AUTO-ENTMCNC: 33.8 G/DL (ref 31.4–37.4)
MCV RBC AUTO: 90 FL (ref 82–98)
PLATELET # BLD AUTO: 296 THOUSANDS/UL (ref 149–390)
PMV BLD AUTO: 10.9 FL (ref 8.9–12.7)
POTASSIUM SERPL-SCNC: 4 MMOL/L (ref 3.5–5.3)
PROT SERPL-MCNC: 7.1 G/DL (ref 6.4–8.2)
PTH-INTACT SERPL-MCNC: 102.5 PG/ML (ref 18.4–80.1)
RBC # BLD AUTO: 4.37 MILLION/UL (ref 3.81–5.12)
SODIUM SERPL-SCNC: 139 MMOL/L (ref 136–145)
TIBC SERPL-MCNC: 366 UG/DL (ref 250–450)
VIT B12 SERPL-MCNC: 298 PG/ML (ref 100–900)
WBC # BLD AUTO: 5.47 THOUSAND/UL (ref 4.31–10.16)

## 2022-03-21 PROCEDURE — 83550 IRON BINDING TEST: CPT

## 2022-03-21 PROCEDURE — 84630 ASSAY OF ZINC: CPT

## 2022-03-21 PROCEDURE — 84425 ASSAY OF VITAMIN B-1: CPT

## 2022-03-21 PROCEDURE — 80053 COMPREHEN METABOLIC PANEL: CPT

## 2022-03-21 PROCEDURE — 83540 ASSAY OF IRON: CPT

## 2022-03-21 PROCEDURE — 36415 COLL VENOUS BLD VENIPUNCTURE: CPT

## 2022-03-21 PROCEDURE — 84590 ASSAY OF VITAMIN A: CPT

## 2022-03-21 PROCEDURE — 82746 ASSAY OF FOLIC ACID SERUM: CPT

## 2022-03-21 PROCEDURE — 82728 ASSAY OF FERRITIN: CPT

## 2022-03-21 PROCEDURE — 85027 COMPLETE CBC AUTOMATED: CPT

## 2022-03-21 PROCEDURE — 82306 VITAMIN D 25 HYDROXY: CPT

## 2022-03-21 PROCEDURE — 82607 VITAMIN B-12: CPT

## 2022-03-21 PROCEDURE — 83970 ASSAY OF PARATHORMONE: CPT

## 2022-03-25 LAB — ZINC SERPL-MCNC: 75 UG/DL (ref 44–115)

## 2022-03-31 LAB — VIT B1 BLD-SCNC: 129.2 NMOL/L (ref 66.5–200)

## 2022-04-04 DIAGNOSIS — R79.89 LOW VITAMIN D LEVEL: ICD-10-CM

## 2022-04-04 DIAGNOSIS — R79.89 HIGH SERUM PARATHYROID HORMONE (PTH): ICD-10-CM

## 2022-04-04 DIAGNOSIS — Z98.84 BARIATRIC SURGERY STATUS: Primary | ICD-10-CM

## 2022-04-04 DIAGNOSIS — K91.2 POSTSURGICAL MALABSORPTION: ICD-10-CM

## 2022-04-04 LAB — VIT A SERPL-MCNC: 42.1 UG/DL (ref 22–69.5)

## 2022-04-13 ENCOUNTER — APPOINTMENT (OUTPATIENT)
Dept: LAB | Age: 62
End: 2022-04-13
Payer: COMMERCIAL

## 2022-04-13 DIAGNOSIS — R19.7 DIARRHEA, UNSPECIFIED TYPE: ICD-10-CM

## 2022-04-13 LAB — LIPASE SERPL-CCNC: 238 U/L (ref 73–393)

## 2022-04-13 PROCEDURE — 83690 ASSAY OF LIPASE: CPT

## 2022-04-13 PROCEDURE — 36415 COLL VENOUS BLD VENIPUNCTURE: CPT

## 2022-04-14 ENCOUNTER — APPOINTMENT (OUTPATIENT)
Dept: LAB | Age: 62
End: 2022-04-14
Payer: COMMERCIAL

## 2022-04-14 DIAGNOSIS — R19.7 DIARRHEA, UNSPECIFIED TYPE: ICD-10-CM

## 2022-04-14 PROCEDURE — 36415 COLL VENOUS BLD VENIPUNCTURE: CPT

## 2022-04-14 PROCEDURE — 82705 FATS/LIPIDS FECES QUAL: CPT

## 2022-04-14 PROCEDURE — 82653 EL-1 FECAL QUANTITATIVE: CPT

## 2022-04-19 LAB
FAT STL QL: NORMAL
NEUTRAL FAT STL QL: NORMAL

## 2022-04-20 LAB — ELASTASE PANC STL-MCNT: <50 UG ELAST./G

## 2022-05-31 RX ORDER — SODIUM CHLORIDE 9 MG/ML
125 INJECTION, SOLUTION INTRAVENOUS CONTINUOUS
Status: CANCELLED | OUTPATIENT
Start: 2022-05-31

## 2022-05-31 RX ORDER — ALBUTEROL SULFATE 2.5 MG/3ML
2.5 SOLUTION RESPIRATORY (INHALATION) ONCE AS NEEDED
Status: CANCELLED | OUTPATIENT
Start: 2022-05-31

## 2022-05-31 RX ORDER — PROMETHAZINE HYDROCHLORIDE 25 MG/ML
12.5 INJECTION, SOLUTION INTRAMUSCULAR; INTRAVENOUS ONCE AS NEEDED
Status: CANCELLED | OUTPATIENT
Start: 2022-05-31

## 2022-05-31 RX ORDER — ONDANSETRON 2 MG/ML
4 INJECTION INTRAMUSCULAR; INTRAVENOUS ONCE AS NEEDED
Status: CANCELLED | OUTPATIENT
Start: 2022-05-31

## 2022-06-01 ENCOUNTER — HOSPITAL ENCOUNTER (OUTPATIENT)
Dept: GASTROENTEROLOGY | Facility: HOSPITAL | Age: 62
Setting detail: OUTPATIENT SURGERY
Discharge: HOME | End: 2022-06-01
Attending: SURGERY
Payer: COMMERCIAL

## 2022-06-01 ENCOUNTER — ANESTHESIA EVENT (OUTPATIENT)
Dept: GASTROENTEROLOGY | Facility: HOSPITAL | Age: 62
End: 2022-06-01

## 2022-06-01 ENCOUNTER — ANESTHESIA (OUTPATIENT)
Dept: GASTROENTEROLOGY | Facility: HOSPITAL | Age: 62
End: 2022-06-01

## 2022-06-01 VITALS
DIASTOLIC BLOOD PRESSURE: 56 MMHG | OXYGEN SATURATION: 96 % | RESPIRATION RATE: 14 BRPM | HEART RATE: 74 BPM | SYSTOLIC BLOOD PRESSURE: 107 MMHG

## 2022-06-01 DIAGNOSIS — Z98.84 BARIATRIC SURGERY STATUS: ICD-10-CM

## 2022-06-01 PROBLEM — K21.9 GASTROESOPHAGEAL REFLUX DISEASE: Status: ACTIVE | Noted: 2022-06-01

## 2022-06-01 PROBLEM — E03.9 HYPOTHYROIDISM: Status: ACTIVE | Noted: 2022-06-01

## 2022-06-01 PROCEDURE — 88305 TISSUE EXAM BY PATHOLOGIST: CPT | Performed by: PATHOLOGY

## 2022-06-01 PROCEDURE — 43239 EGD BIOPSY SINGLE/MULTIPLE: CPT | Performed by: SURGERY

## 2022-06-01 RX ORDER — SODIUM CHLORIDE 9 MG/ML
125 INJECTION, SOLUTION INTRAVENOUS CONTINUOUS
Status: DISCONTINUED | OUTPATIENT
Start: 2022-06-01 | End: 2022-06-05 | Stop reason: HOSPADM

## 2022-06-01 RX ORDER — LIDOCAINE HYDROCHLORIDE 20 MG/ML
INJECTION, SOLUTION EPIDURAL; INFILTRATION; INTRACAUDAL; PERINEURAL AS NEEDED
Status: DISCONTINUED | OUTPATIENT
Start: 2022-06-01 | End: 2022-06-01

## 2022-06-01 RX ORDER — PROPOFOL 10 MG/ML
INJECTION, EMULSION INTRAVENOUS AS NEEDED
Status: DISCONTINUED | OUTPATIENT
Start: 2022-06-01 | End: 2022-06-01

## 2022-06-01 RX ADMIN — PROPOFOL 150 MG: 10 INJECTION, EMULSION INTRAVENOUS at 07:52

## 2022-06-01 RX ADMIN — SODIUM CHLORIDE 125 ML/HR: 0.9 INJECTION, SOLUTION INTRAVENOUS at 07:33

## 2022-06-01 RX ADMIN — PROPOFOL 50 MG: 10 INJECTION, EMULSION INTRAVENOUS at 07:54

## 2022-06-01 RX ADMIN — LIDOCAINE HYDROCHLORIDE 100 MG: 20 INJECTION, SOLUTION EPIDURAL; INFILTRATION; INTRACAUDAL at 07:52

## 2022-06-01 NOTE — ANESTHESIA POSTPROCEDURE EVALUATION
Post-Op Assessment Note    CV Status:  Stable    Pain management: adequate     Mental Status:  Alert and awake   Hydration Status:  Euvolemic   PONV Controlled:  Controlled   Airway Patency:  Patent      Post Op Vitals Reviewed: Yes            No complications documented      BP      Temp      Pulse     Resp      SpO2      /56   Pulse 74   Resp 14   SpO2 96%

## 2022-06-01 NOTE — H&P
This is a 58 y o  female status post a Alex-En-Y Gastric Bypass with Dr Tia Calzada in 2011      Overall doing fair  States that since surgery has had dumping almost daily  Here for an EGD to evaluate the anatomy of the GI tract  Physical Exam    /64   Pulse 65   Resp 15   SpO2 96%    AAOx3  RRR  CTA B  Abdomen obese  Benign  A/P:    This is a 58 y o  female status post a gastric bypass in 2011 and now screening endoscopy to assess the anatomy of her bypass  Will proceed with the EGD and biopsies        Otoniel Love MD  06/01/22  7:48 AM

## 2022-06-03 ENCOUNTER — OFFICE VISIT (OUTPATIENT)
Dept: PHYSICAL THERAPY | Facility: CLINIC | Age: 62
End: 2022-06-03
Payer: COMMERCIAL

## 2022-06-03 DIAGNOSIS — M79.671 RIGHT FOOT PAIN: Primary | ICD-10-CM

## 2022-06-03 NOTE — PROGRESS NOTES
Pt reports that plantar foot pain has mostly resolved with use of the orthotics but she has developed pain in dorsum of R ankle that limits her WB tolerance  She followed break in process and now is wearing them most of the day but needs to take the orthotics out around 6 PM   Noted decreased TC jt mobility (anterior)    PROM R ankle is wfl, pain end range PF  Mild TTP dorsum of ankle/talus  DF MMT wnl    Added 1/16 lateral wedge for trial  Will f/u prn  Pt may also benefit from PT to improve mobility of TC jt  Possible that symptoms are coming from talus  Pt will f/u with physician if modification does not help

## 2022-06-06 ENCOUNTER — OFFICE VISIT (OUTPATIENT)
Dept: PHYSICAL THERAPY | Facility: CLINIC | Age: 62
End: 2022-06-06
Payer: COMMERCIAL

## 2022-06-06 DIAGNOSIS — M79.671 RIGHT FOOT PAIN: Primary | ICD-10-CM

## 2022-06-06 PROCEDURE — 97140 MANUAL THERAPY 1/> REGIONS: CPT | Performed by: PHYSICAL THERAPIST

## 2022-06-06 PROCEDURE — 97161 PT EVAL LOW COMPLEX 20 MIN: CPT | Performed by: PHYSICAL THERAPIST

## 2022-06-06 NOTE — PROGRESS NOTES
PT Evaluation     Today's date: 2022  Patient name: Jim Mace  : 1960  MRN: [id number]  Referring provider: Domingo Benton PT  Dx:   Encounter Diagnosis     ICD-10-CM    1  Right foot pain  M79 671                   Assessment  Assessment details: Jim Mace is a 58 y o  female who presents with R foot pain  Pt has decreased ankle ROM and hypomobility t/o TCJ  Pt currently has pain with prolonged standing/walking, difficulty walking hills, pain with driving  Pt would benefit from skilled PT to address the above impairments and to return to pain free function  Impairments: abnormal or restricted ROM, lacks appropriate home exercise program and pain with function  Functional limitations: pain with prolonged standing/walking, difficulty walking hills, pain with driving  Symptom irritability: moderateUnderstanding of Dx/Px/POC: good   Prognosis: good    Goals  1  Pt will be independent with HEP upon DC  2  Pt's R ankle flexibility will increase by 10 degrees  3  Pt's pain will be no more than 3/10 to allow for prolonged walking  4  Pt will report being able to negotiate hills with minimal pain  Plan  Patient would benefit from: skilled physical therapy  Planned modality interventions: low level laser therapy  Planned therapy interventions: joint mobilization, manual therapy, neuromuscular re-education, patient education, therapeutic activities, therapeutic exercise, strengthening and home exercise program  Frequency: 1x week  Duration in visits: 4  Duration in weeks: 4  Treatment plan discussed with: patient        Subjective Evaluation    History of Present Illness  Date of onset: 2022  Mechanism of injury: Pt reports an insidious onset of R foot pain about 6 months ago  Pt was prescribed custom orthotics which diminished her plantar pain however she then developed pain in the dorsum of her foot  Pt denies imaging  Pt presents to OP PT direct access             Not a recurrent problem   Quality of life: good    Pain  Current pain ratin  At best pain ratin  At worst pain ratin  Location: R foot/ankle  Quality: dull ache  Aggravating factors: standing and walking  Progression: worsening      Diagnostic Tests  No diagnostic tests performed  Treatments  No previous or current treatments  Patient Goals  Patient goals for therapy: decreased pain          Objective     Tenderness     Right Ankle/Foot   Tenderness in the posterior tibial tendon and talar dome  Active Range of Motion     Right Ankle/Foot   Dorsiflexion (ke): 0 degrees   Plantar flexion: WFL  Inversion: WFL  Eversion: WFL    Passive Range of Motion     Right Ankle/Foot    Dorsiflexion (ke): 0 degrees     Joint Play     Right Ankle/Foot  Hypomobile in the talocrural joint                Precautions: none      Manuals             Laser to TCJ 16W 4'            LAURA malik MD            Neuro Re-Ed                                                                                                        Ther Ex             Bike             Wall stretches (gastroc/soleus) 3x30" ea                                                                                          Ther Activity

## 2022-06-14 ENCOUNTER — OFFICE VISIT (OUTPATIENT)
Dept: PHYSICAL THERAPY | Facility: CLINIC | Age: 62
End: 2022-06-14
Payer: COMMERCIAL

## 2022-06-14 DIAGNOSIS — M79.671 RIGHT FOOT PAIN: Primary | ICD-10-CM

## 2022-06-14 PROCEDURE — 97140 MANUAL THERAPY 1/> REGIONS: CPT | Performed by: PHYSICAL THERAPIST

## 2022-06-14 PROCEDURE — 97110 THERAPEUTIC EXERCISES: CPT | Performed by: PHYSICAL THERAPIST

## 2022-06-14 NOTE — PROGRESS NOTES
Daily Note     Today's date: 2022  Patient name: Noa Lubin  : 1960  MRN: [id number]  Referring provider: ALINE Alvarez  Dx:   Encounter Diagnosis     ICD-10-CM    1  Right foot pain  M79 671                   Subjective: Pt reports overall improvements  Objective: See treatment diary below      Assessment: Reviewed HEP with good understanding  Adjusted orthotics by adding another  lateral wedge  Plan: Continue per plan of care        Precautions: none      Manuals            Laser to TCJ 16W 4' 16W 4'           TCJ susannah malik MD, MD           Neuro Re-Ed                                                                                                        Ther Ex             Bike             Wall stretches (gastroc/soleus) 3x30" ea review           Orthotic modification  Added another  lat wedge                                                                            Ther Activity

## 2022-06-16 ENCOUNTER — OFFICE VISIT (OUTPATIENT)
Dept: BARIATRICS | Facility: CLINIC | Age: 62
End: 2022-06-16

## 2022-06-16 VITALS
DIASTOLIC BLOOD PRESSURE: 70 MMHG | HEIGHT: 61 IN | TEMPERATURE: 98.2 F | BODY MASS INDEX: 32.57 KG/M2 | WEIGHT: 172.5 LBS | HEART RATE: 72 BPM | SYSTOLIC BLOOD PRESSURE: 118 MMHG

## 2022-06-16 DIAGNOSIS — E66.9 OBESITY, CLASS I, BMI 30-34.9: Primary | ICD-10-CM

## 2022-06-16 PROCEDURE — 99024 POSTOP FOLLOW-UP VISIT: CPT | Performed by: SURGERY

## 2022-06-16 RX ORDER — LANOLIN ALCOHOL/MO/W.PET/CERES
CREAM (GRAM) TOPICAL
COMMUNITY
End: 2022-06-23 | Stop reason: ALTCHOICE

## 2022-06-16 RX ORDER — PANTOPRAZOLE SODIUM 40 MG/1
TABLET, DELAYED RELEASE ORAL
COMMUNITY
Start: 2022-03-22

## 2022-06-16 RX ORDER — PANCRELIPASE LIPASE, PANCRELIPASE PROTEASE, PANCRELIPASE AMYLASE 40000; 126000; 168000 [USP'U]/1; [USP'U]/1; [USP'U]/1
CAPSULE, DELAYED RELEASE ORAL
COMMUNITY
Start: 2022-05-20

## 2022-06-16 RX ORDER — MULTIVIT-MIN/IRON/FOLIC ACID/K 18-600-40
CAPSULE ORAL
COMMUNITY

## 2022-06-16 NOTE — PROGRESS NOTES
Subjective:      58 y o  female with history of Alex-En-Y Gastric Bypass with Dr Janeth Peace in , presents to the office today for follow up of EGD performed on 22 by Venkatesh Grover  Patient has had symptoms of vomiting (several times a week) and diarrhea with every meal     She has removed certain foods from her diet to help with these symptoms    Has had a colonoscopy in 2019 and is due for one in     Has been worked up for gallbladder issues, but all studies were normal    Was told her pancreas was not producing enough elastase and was started on medication  Has not noticed a change after starting the medication about 4 weeks ago     Historical Information   Past Medical History:   Diagnosis Date    Cervical cancer ([address] )     GERD (gastroesophageal reflux disease)      Past Surgical History:   Procedure Laterality Date    BREAST BIOPSY Right      SECTION  1986    COLONOSCOPY  2019    2nd procedure    EGD  2019    GASTRIC BYPASS  2011    HYSTERECTOMY      Age 50    NOSE SURGERY      removal of a growth in the right nostril    OOPHORECTOMY Bilateral     Age 50    OTHER SURGICAL HISTORY  2019    Cervical Intraepithelial Neioplasia [address] 2 Removal    URETHRAL SLING  2010     Social History   Social History     Substance and Sexual Activity   Alcohol Use Not Currently     Social History     Substance and Sexual Activity   Drug Use Never     Social History     Tobacco Use   Smoking Status Never Smoker   Smokeless Tobacco Never Used     Family History: non-contributory    Meds/Allergies   all medications and allergies reviewed  Allergies   Allergen Reactions    Latex Rash       Objective   Review of Systems   Constitutional: Negative for chills and fever  HENT: Negative for ear pain and sore throat  Eyes: Negative for pain and visual disturbance  Respiratory: Negative for cough and shortness of breath      Cardiovascular: Negative for chest pain and palpitations  Gastrointestinal: Positive for diarrhea and vomiting  Negative for abdominal pain  Genitourinary: Negative for dysuria and hematuria  Musculoskeletal: Negative for arthralgias and back pain  Skin: Negative for color change and rash  Neurological: Negative for seizures and syncope  All other systems reviewed and are negative  Objective:    /70 (BP Location: Left arm, Patient Position: Sitting, Cuff Size: Standard)   Pulse 72   Temp 98 2 °F (36 8 °C) (Tympanic)   Ht 5' 0 5" (1 537 m)   Wt 78 2 kg (172 lb 8 oz)   BMI 33 13 kg/m²       Physical Exam  Constitutional:       Appearance: Normal appearance  She is obese  HENT:      Head: Normocephalic and atraumatic  Cardiovascular:      Rate and Rhythm: Normal rate  Pulmonary:      Effort: Pulmonary effort is normal    Musculoskeletal:         General: Normal range of motion  Skin:     General: Skin is warm and dry  Neurological:      General: No focal deficit present  Mental Status: She is alert and oriented to person, place, and time  Psychiatric:         Mood and Affect: Mood normal          Behavior: Behavior normal              EGD (6/1/22):   DETAILS OF PROCEDURE:  Patient was taken to the procedure room where a time out was performed to confirm correct patient and correct procedure  The patient underwent monitored anesthesia care, which was administered by an anesthesia professional  The patient's blood pressure, heart rate, level of consciousness, respirations and oxygen were monitored throughout the procedure  The scope was advanced to the proximal part of the jejunum  Prior to the procedure, the patient's H  Pylori status was unknown  The patient experienced no blood loss  The procedure was not difficult  The patient tolerated the procedure well  There were no apparent complications  This is a 58 y o  female status post a Alex-En-Y Gastric Bypass with Dr Patrizia Beth in 2011      Overall doing fair   States that since surgery has had dumping almost daily         Here for an EGD to evaluate the anatomy of the GI tract       ANESTHESIA INFORMATION:  ASA: II  Anesthesia Type: General     MEDICATIONS:  sodium chloride 0 9 % infusion 600 mL*               *From user-documented volume   (Totals for administrations occurring from 0749 to 0756 on 06/01/22)         FINDINGS:  · All observed locations appeared normal   · Performed 2 forceps biopsies to rule out H  pylori in the stomach        SPECIMENS:  ID Type Source Tests Collected by Time Destination   1 : gastric pouch biopsies, rule out h pylori, retrieved with cold biopsy forcep Tissue Stomach TISSUE EXAM Britt Ayala MD 6/1/2022 0754              IMPRESSION:  Normal gastric bypass anatomy  No evidence of anastomotic stricture, marginal ulceration or gastrogastric fistula      RECOMMENDATION:  Continue with the bariatric program process and follow up in the office  Biopsy results pending  Pathology (6/1/22): Final Diagnosis   A  Stomach, gastric pouch biopsies, rule out h pylori, retrieved with cold biopsy forcep:  -Benign gastric- type mucosa with mild chronic superfical inactive gastritis   -No evidence of Paneth cell metaplasia or atrophic gastritis   -No evidence of dysplasia or malignancy   -No H Pylori organisms identified on  H&E stained slide       Assessment/Plan:       Brittany Garcia is a 58 y o  female  s/p Alex-En-Y Gastric Bypass with Dr Patrizia Beth in 2011 now with vomiting and diarrhea   There are no diagnoses linked to this encounter  · Continue PPI therapy  · Continue vitamins as directed, with routine blood work follow up  · Continue following up with GI  · Will schedule an appointment with the dieticians  · Follow up per bariatric protocol and with dieticians      Britt Ayala MD  Bariatric Surgery

## 2022-06-20 ENCOUNTER — OFFICE VISIT (OUTPATIENT)
Dept: BARIATRICS | Facility: CLINIC | Age: 62
End: 2022-06-20

## 2022-06-20 VITALS — BODY MASS INDEX: 32.47 KG/M2 | WEIGHT: 172 LBS | HEIGHT: 61 IN

## 2022-06-20 DIAGNOSIS — R19.7 DIARRHEA FOLLOWING GASTROINTESTINAL SURGERY: Primary | ICD-10-CM

## 2022-06-20 DIAGNOSIS — Z98.890 DIARRHEA FOLLOWING GASTROINTESTINAL SURGERY: Primary | ICD-10-CM

## 2022-06-20 DIAGNOSIS — Z98.84 H/O BARIATRIC SURGERY: ICD-10-CM

## 2022-06-20 DIAGNOSIS — R79.89 HIGH SERUM PARATHYROID HORMONE (PTH): ICD-10-CM

## 2022-06-20 DIAGNOSIS — R79.89 LOW VITAMIN D LEVEL: ICD-10-CM

## 2022-06-20 DIAGNOSIS — K91.2 POSTSURGICAL MALABSORPTION: ICD-10-CM

## 2022-06-20 DIAGNOSIS — K21.9 GASTROESOPHAGEAL REFLUX DISEASE: ICD-10-CM

## 2022-06-20 PROCEDURE — RECHECK: Performed by: DIETITIAN, REGISTERED

## 2022-06-20 NOTE — PATIENT INSTRUCTIONS
Take pancreatic enzymes with meals   Discuss with doctor taking protonix twice per day  Switch to bariatric pal one a day vitamin and take 2 calcium carbonate per day - either chewable or soft gel  Consider banatrol to help reduce diarrhea    Nutrition Tips for Relief of Diarrhea   WHAT YOU NEED TO KNOW:   What are nutrition tips for relief of diarrhea? There are diet changes you can make to help relieve or stop diarrhea  These changes include limiting or avoiding foods and liquids that are high in sugar, fat, fiber, and lactose  Lactose is a sugar found in milk products  Milk products can cause diarrhea in people who are lactose intolerant  You should also drink extra liquids to replace fluids that are lost when you have diarrhea  Diarrhea can lead to dehydration  Which foods and liquids should I limit or avoid? Dairy:      Whole milk    Half-and-half, cream, and sour cream    Regular (whole milk) ice cream    Grains:      Whole wheat and whole grain breads, pasta, cereals, and crackers    Brown and wild rice    Breads and cereals with seeds or nuts    Popcorn    Fruit and vegetables: All raw fruits, except bananas and melon    Dried fruits, including prunes and raisins    Canned fruit in heavy syrup    Prune juice and any fruit juice with pulp    Raw vegetables, except lettuce     Fried vegetables    Corn, raw and cooked broccoli, cabbage, cauliflower, and erik greens    Protein:      Fried meat, poultry, and fish    High-fat luncheon meats, such as bologna    Fatty meats, such as sausage, dumont, and hot dogs    Beans and nuts    Liquids:      Sodas and fruit-flavored drinks    Drinks that contain caffeine, such as energy drinks, coffee, and tea     Drinks that contain alcohol or sugar alcohol, such as sorbitol    Which foods and liquids may I eat and drink? Most people can tolerate the foods and liquids listed below   If any of them make your symptoms worse, stop eating or drinking them until you feel better  If you are lactose intolerant, avoid milk products  Dairy:      Skim or low-fat milk or evaporated milk    Soy milk or buttermilk     Low-fat, part-skim, and aged cheese    Yogurt, low-fat ice cream, or sherbert    Grains:  (Choose foods with less than 2 grams of dietary fiber per serving )     White or refined flour breads, bagels, pasta, and crackers    Cold or hot cereals made from white or refined flour such as puffed rice, cornflakes, or cream of wheat    White rice    Fruit and vegetables:      Bananas or melon    Fruit juice without pulp, except prune juice    Canned fruit in juice or light syrup    Lettuce and most well-cooked vegetables without seeds or skins     Strained vegetable juice    Protein:      Tender, well-cooked meat, poultry, or fish    Well-cooked eggs or soy foods (cooked without added fat)    Smooth nut butters    Fats:  (Limit fats to less than 8 teaspoons a day)     Oil, butter, or margarine, or mayonnaise    Cream cheese or salad dressings    Liquids: For infants, breast milk or formula    Oral rehydration solution     Decaffeinated coffee or caffeine-free teas    Soft drinks without caffeine    What other guidelines should I follow? Drink liquids as directed  You may need to drink more liquids than usual to prevent dehydration  Ask how much liquid to drink each day and which liquids are best for you  You may need to drink an oral rehydration solution (ORS)  An ORS helps replace fluids and electrolytes that you lose when you have diarrhea  Eat small meals or snacks every 3 to 4 hours  instead of large meals  Continue eating even if you still have diarrhea  Your diarrhea will continue for a few days but should gradually go away  CARE AGREEMENT:   You have the right to help plan your care  Discuss treatment options with your healthcare provider to decide what care you want to receive  You always have the right to refuse treatment   The above information is an  only  It is not intended as medical advice for individual conditions or treatments  Talk to your doctor, nurse or pharmacist before following any medical regimen to see if it is safe and effective for you  © Copyright [employer] 2022 Information is for End User's use only and may not be sold, redistributed or otherwise used for commercial purposes   All illustrations and images included in CareNotes® are the copyrighted property of [employer]  or [address] [place] [address]

## 2022-06-20 NOTE — PROGRESS NOTES
Bariatric Nutrition Assessment Note      Type of surgery  Gastric bypass: laparoscopic  Surgery Date:  [facility] Dr Marline Bright   Was lost to follow up for many years     Surgeon: Dr Mega Mederos- for follow up care     Nutrition Assessment   Rock Whitney  58 y o   female  Ht 5' 0 5" (1 537 m)   Wt 78 kg (172 lb)   BMI 33 04 kg/m²      Wt Readings from Last 3 Encounters:   22 78 2 kg (172 lb 8 oz)   02/10/22 77 3 kg (170 lb 8 oz)   21 74 8 kg (165 lb)       Zayra- [facility] Godinez Equation:   1272 kcal per day  Estimated calories for weight maintenance: 1526 kcal per day   Estimated calories for weight loss 6579-3076 kcal per day - ( 1/2 to 1#  per wk wt loss - sedentary )  Estimated protein needs 60-71 grams per day (1 0-1 2 gms/kg IBW )   Estimated fluid needs 60-64 ounces per day (30 ml/kg IBW )      Weight on Day of Weight Loss Surgery: 210#  Weight in (lb) to have BMI = 25: 130 7#  Pre-Op Excess Wt: 79 3#  Post-Op Wt Loss: 38#/ 48% EBWL in 10 year(s)  Giles wt = 150# per pt     Review of History and Medications   Past Medical History:   Diagnosis Date    Cervical cancer ([address] )     GERD (gastroesophageal reflux disease)      Past Surgical History:   Procedure Laterality Date    BREAST BIOPSY Right      SECTION  1986    COLONOSCOPY  2019    2nd procedure    EGD  2019    GASTRIC BYPASS  2011    HYSTERECTOMY      Age 50    NOSE SURGERY      removal of a growth in the right nostril    OOPHORECTOMY Bilateral     Age 50    OTHER SURGICAL HISTORY  2019    Cervical Intraepithelial Neioplasia Grage 2 Removal    URETHRAL SLING  2010     Social History     Socioeconomic History    Marital status: /Civil Union     Spouse name: Not on file    Number of children: Not on file    Years of education: Not on file    Highest education level: Not on file   Occupational History    Not on file   Tobacco Use    Smoking status: Never Smoker    Smokeless tobacco: Never Used   Vaping Use    Vaping Use: Never used   Substance and Sexual Activity    Alcohol use: Not Currently    Drug use: Never    Sexual activity: Not on file   Other Topics Concern    Not on file   Social History Narrative    Not on file     Social Determinants of Health     Financial Resource Strain: Not on file   Food Insecurity: Not on file   Transportation Needs: Not on file   Physical Activity: Not on file   Stress: Not on file   Social Connections: Not on file   Intimate Partner Violence: Not on file   Housing Stability: Not on file       Current Outpatient Medications:     Ascorbic Acid (vitamin C) 1000 MG tablet, , Disp: , Rfl:     B Complex-C (SUPER B COMPLEX PO), , Disp: , Rfl:     Biotin 89696 MCG TABS, , Disp: , Rfl:     cetirizine (ZyrTEC) 10 mg tablet, cetirizine 10 mg tablet  TAKE 1 TABLET BY MOUTH ONCE DAILY FOR 30 DAYS (Patient not taking: Reported on 6/16/2022), Disp: , Rfl:     cyanocobalamin (VITAMIN B-12) 1000 MCG tablet, Take 1 tablet by mouth, Disp: , Rfl:     Estradiol 6 MG PLLT, , Disp: , Rfl:     famotidine (PEPCID) 10 mg tablet, famotidine  1 tablet by mouth before breakfast, Disp: , Rfl:     famotidine (PEPCID) 20 mg tablet, 40 mg 2 (two) times a day  , Disp: , Rfl:     ferrous gluconate (FERGON) 324 mg tablet, ferrous gluconate 324 mg (38 mg iron) tablet (Patient not taking: Reported on 2/10/2022), Disp: , Rfl:     levocetirizine (XYZAL) 5 MG tablet, Daily, Disp: , Rfl:     magnesium oxide (MAG-OX) 400 mg tablet, Take 400 mg by mouth, Disp: , Rfl:     Multiple Vitamins-Minerals (ONE-A-DAY 50 PLUS PO), , Disp: , Rfl:     pantoprazole (PROTONIX) 40 mg tablet, , Disp: , Rfl:     Synthroid 112 MCG tablet, , Disp: , Rfl:     Testosterone 50 MG PLLT, , Disp: , Rfl:     Testosterone 50 MG PLLT, COAST - testosterone 50mg pellet  testosterone 50mg pellet  one pellet, Disp: , Rfl:     vitamin B-12 (VITAMIN B-12) 1,000 mcg tablet, , Disp: , Rfl:     Vitamin D, Cholecalciferol, 25 MCG (1000 UT) TABS, , Disp: , Rfl:     Zenpep 95257-791674 units CPEP, 1 (ONE) CAPSULE 5 TABS DAILY, Disp: , Rfl:     Food Intake and Lifestyle Assessment   Food Intake Assessment completed via usual diet recall  Breakfast: 7 to 8 am   1/2 bagel or 1 slice of bread, with 1 slice cheese and  fruit   OR  Eggs with veggies and avocado ( if eats out after gym )   2 cups coffee   Snack: 0   Lunch: 1/2 sandwich   Snack: [employer]: 1/2 sandwich or lean protein/ veggies/ starch   Snack: will sometime finish dinner   Beverage intake: water and coffee/tea  Diet texture/stage: regular  Protein supplement: none - reports that anything 'creamy" gives her diarrhea   Estimated protein intake per day: 40-60 grams ( unclear per recall )   Estimated fluid intake per day: drinks plenty of water   Meals eaten away from home: several days per week - usually breakfast after gym   Typical meal pattern: 3 meals per day and 1-2 snacks per day  Eating Behaviors: Appropriate diet advancement, Frequent snacking/ grazing and Mindless eating    Pt will eat to satiety, then go back and hour later to finish her meal    Pt is taking biotin, vitamin C, B complex, B12 one a day and Vitamin D    Food allergies or intolerances: reports that she has become lactose intolerance  Cannot tolerate simple sugars, or any fat ( avoids all oils and butter ) nothing creamy  Avoids high spice foods     Cultural or Restorationism considerations: N/A     Physical Assessment  Lab Results   Component Value Date     5 (H) 03/21/2022    CALCIUM 8 8 03/21/2022   Vitamin D 23 2    Nutrition Related Findings  Dumping, Diarrhea, Nausea and Vomiting    Takes zenep pancreatic enzyme- pancreatin  Prescribed by GI doctor, assessed that her pancreatic enzymes are suboptimal   Enzymes must be taken with food , and iron separately   Takes protonix once per day in am     Also prescribed implants of estrogen and testosterone by doctor she sees in [address] [facility] [address]      Physical Activity  Types of exercise: - 4 days per week   Current physical limitations: chronic diarrhea     Psychosocial Assessment   Support systems: spouse  Socioeconomic factors: not assessed at this time     Nutrition Diagnosis  Diagnosis: Excessive energy intake (NI-1 5) and Inadequate protein intake (NI-5 7  3)  Related to: Food and nutrition-related knowledge deficit and Altered GI function  As Evidenced by: Unintentional weight gain and chronic diarrhea     Nutrition Prescription: Recommend the following diet  Low fat/ low sugar/low lactose/low  insoluble fiber diet to reduce episodes of diarrhea   Can increase soluble fiber with sugar free gummies and banatrol ( banaflakes to help with diarrhea )  Also doctor approved use of immodium when she goes out to reduce diarrhea     Interventions and Teaching   Patient educated on post-op nutrition guidelines  Patient educated and handouts provided    Surgical changes to stomach / GI  Capacity of post-surgery stomach  Diet progression  Adequate hydration  Sugar and fat restriction to decrease "dumping syndrome"  Fat restriction to decrease steatorrhea  Expected weight loss  Weight loss plateaus/ possibility of weight regain  Exercise  Nutrition considerations after surgery  Protein supplements  Dietary and lifestyle changes  Possible problems with poor eating habits  Intuitive eating  Potential for food intolerance after surgery, and ways to deal with them including: lactose intolerance, nausea, reflux, vomiting, diarrhea, food intolerance, appetite changes, gas  Vitamin / Mineral supplementation of Multivitamin with minerals, Calcium, Vitamin B12, Iron, Fat Soluble vitamins and Vitamin D   To reduce pills - provided with 30 day samples of Bariatric Pal once per day bariatric vitamin/mineral    To replace   Extra Vitamin D   Vitamin C   Biotin   B12   B complex   Multivitamin   In addition to once per day bariatric pal, recommend switching from hard calcium supplements to chewable calcium carbonate or soft capsule calcium carbonate taken with food twice per day    Reviewed foods to avoid that may cause aggravate diarrhea   Discussed avoiding grazing to decrease weight gain  Recommended that when she is full - do not return an hour later to finish her plate  Encouraged attendance at support group     Education provided to: patient    Barriers to learning: No barriers identified    Readiness to change: preparation    Comprehension: needs reinforcement and verbalizes understanding     Expected Compliance: good    Evaluation/Monitoring   Eating pattern as discussed Tolerance of nutrition prescription Body weight Lab values Physical activity Bowel pattern    Goals  Eliminate sugar sweetened beverages, Exercise 30 minutes 5 times per week, Complete lession plans 1-6, Eat 3 meals per day and Eliminate mindless snacking   Take pancreatic enzymes with meals   Discuss with doctor taking protonix twice per day  Switch to bariatric pal one a day vitamin and take 2 calcium carbonate per day - either chewable or soft gel  Consider banatrol to help reduce diarrhea   Pt was referred by surgeon to [facility] provider which is scheduled by end of the month   Will f/u based on MWM provider treatment plan       Provided with samples of 30 day chewable bariatric pal and one Vanilla Ensure Max per day     Time Spent:   1 Hour 15 Minutes

## 2022-06-23 ENCOUNTER — CONSULT (OUTPATIENT)
Dept: BARIATRICS | Facility: CLINIC | Age: 62
End: 2022-06-23
Payer: COMMERCIAL

## 2022-06-23 ENCOUNTER — OFFICE VISIT (OUTPATIENT)
Dept: PHYSICAL THERAPY | Facility: CLINIC | Age: 62
End: 2022-06-23
Payer: COMMERCIAL

## 2022-06-23 VITALS
HEIGHT: 61 IN | SYSTOLIC BLOOD PRESSURE: 128 MMHG | HEART RATE: 66 BPM | BODY MASS INDEX: 32.65 KG/M2 | RESPIRATION RATE: 16 BRPM | DIASTOLIC BLOOD PRESSURE: 72 MMHG | WEIGHT: 172.9 LBS

## 2022-06-23 DIAGNOSIS — M79.671 RIGHT FOOT PAIN: Primary | ICD-10-CM

## 2022-06-23 DIAGNOSIS — E66.9 OBESITY, CLASS I, BMI 30-34.9: Primary | ICD-10-CM

## 2022-06-23 DIAGNOSIS — K21.9 GASTROESOPHAGEAL REFLUX DISEASE: ICD-10-CM

## 2022-06-23 PROBLEM — E66.811 OBESITY, CLASS I, BMI 30-34.9: Status: ACTIVE | Noted: 2022-06-23

## 2022-06-23 PROCEDURE — 97110 THERAPEUTIC EXERCISES: CPT | Performed by: PHYSICAL THERAPIST

## 2022-06-23 PROCEDURE — 99214 OFFICE O/P EST MOD 30 MIN: CPT | Performed by: PHYSICIAN ASSISTANT

## 2022-06-23 PROCEDURE — 97140 MANUAL THERAPY 1/> REGIONS: CPT | Performed by: PHYSICAL THERAPIST

## 2022-06-23 RX ORDER — LORATADINE 10 MG/1
CAPSULE, LIQUID FILLED ORAL
COMMUNITY

## 2022-06-23 RX ORDER — PHENTERMINE HYDROCHLORIDE 37.5 MG/1
TABLET ORAL
Qty: 30 TABLET | Refills: 1 | Status: SHIPPED | OUTPATIENT
Start: 2022-06-23

## 2022-06-23 NOTE — PROGRESS NOTES
Daily Note     Today's date: 2022  Patient name: Damion Ramirez  : 1960  MRN: [id number]  Referring provider: ALINE Chang  Dx:   Encounter Diagnosis     ICD-10-CM    1  Right foot pain  M79 671                   Subjective: Pt reports the lateral wedge added last time makes her feel better  Pt requested to add another  Objective: See treatment diary below      Assessment: Increased TCJ mobility noted today  Added in PF stretch  May consider adding lateral flange to custom orthotic to prevent sliding  Plan: Continue per plan of care        Precautions: none      Manuals           Laser to TCJ 16W 4' 16W 4' 16W 4'          TCJ susannah GONCALVES MD, MD          Keith Space mobs MD MD GONCALVES          TCJ distraction MD MD GONCALVES          Neuro Re-Ed                                                                                                        Ther Ex             Bike             Wall stretches (gastroc/soleus) 3x30" ea review 3x30" ea          PF stretch   3x30"          Orthotic modification  Added another  lat wedge added  lat wedge per pt request                                                                           Ther Activity

## 2022-06-23 NOTE — PATIENT INSTRUCTIONS
Continue exercising 3 times a week  Try to get at least 64 ounces of water a day  Try to log in food intake Problem: Activity/Exercise Intolerance  Goal: Patient will tolerate activity/exercise  Outcome: Outcome Met, Continue evaluating goal progress toward completion  Intervention: Progress activity per Cardiac Rehab protocol  Intervention Status  Done  Intervention: Progressive graded ambulation  Intervention Status  Done  Intervention: Collaborate with nursing to ensure ambulation 4-6 times per day  Intervention Status  Done  Intervention: Upper and lower extremity calisthenics per Cardiac Rehab protocol  Intervention Status  Done  Intervention: Monitor and document progressive activity response  Intervention Status  Done  Intervention: Encourage hourly incentive spirometry, cough and deep breathe  Intervention Status  Done

## 2022-06-23 NOTE — PROGRESS NOTES
Assessment/Plan:    Obesity, Class I, BMI 30-34 9  -Discussed options of HealthyCORE-Intensive Lifestyle Intervention Program, Very Low Calorie Diet-VLCD and Conservative Program and the role of weight loss medications  Gastric bypass: laparoscopic  Surgery Date: 2011 [facility] Dr Cheryle Lah  -Initial weight loss goal of 5-10% weight loss for improved health  - Labs reviewed from 3/21/22 all within acceptable limits    -Patient is interested in pursuing HealthyCORE-Intensive Lifestyle Intervention Program- she will call office to schedule  Goals:  -Food log and paper log given  -No sugary beverages  At least 64oz of water daily   -Increase physical activity by 10 minutes daily  Gradually increase physical activity to a goal of 5 days per week for 30 minutes of MODERATE intensity PLUS 2 days per week of FULL BODY resistance training-continue current gym routine  -Practice 30/60 rule    To start phentermine 37 5mg 1/2 tablet initially for 2 weeks  Last EKG reviewed and NSR  Phentermine has as potential side effects of increased heart rate, increased blood pressure, palpitations, headache, dizziness, insomnia, altered mood, abdominal upset, and dry mouth  Notify the provider with change in mood  ER with SI/HI  Phentermine should be stopped prior to surgery  Notify the provider with any changes in vision  -not interested in injectable medication at this time      Gastroesophageal reflux disease  Has had recent EGD and to continue pantoprazole and GI follow up    Hypothyroidism  tsh 10/3/21 wnl    -continue management with prescribing provider        Follow up in approximately 2 months with Non-Surgical Physician/Advanced Practitioner  Diagnoses and all orders for this visit:    Obesity, Class I, BMI 30-34 9  -     phentermine (ADIPEX-P) 37 5 MG tablet;  Take 1/2 tablet a day for the first 2 weeks and then take 1 tablet a day    Gastroesophageal reflux disease    Other orders  -     Multiple Vitamins-Minerals (BARIATRIC FUSION PO); Take by mouth  -     Calcium Carbonate-Vit D-Min (CALCIUM 1200 PO); Take by mouth  -     Loratadine (Claritin) 10 MG CAPS; Take by mouth          Subjective:   Chief Complaint   Patient presents with    Consult     MWM consult post op wt gain; waist; goal wt       Patient ID: Oren Nava  is a 58 y o  female with excess weight/obesity here to pursue weight management  Past Medical History:   Diagnosis Date    Cerv[address] ([facility] [address] )     GERD (gastroesophageal reflux disease)        HPI:  Here for consult for post op weight gain s/pGastric bypass: laparoscopic  Surgery Date: 2011 [facility] Dr Evita Trujillo   Giles: 150lb  She has been having acid reflux and diarrhea since surgery  She has been good with vitamin intake  She has realized some of the vitamins was causing dairrhea  She was found to have severe pancreatic insuffiency and was rx zenpap and has been on it since May  She is on pantoprazole and she has less diarrhea but is still having oily stools  She is taking magnesium once a day for cramping  She is feeling hungry constantly and will take a few bites and step away  She gets hungry more frequently then she would like  She has been slowly gaining weight over time and has been exercising to help  Obesity/Excess Weight:  Severity: class I  Onset:  Years but has been gaining more over last few years    Modifiers: Diet and Exercise, Physician Supervised Weight Loss Program and Commercial Weight Loss Programs-ie   Weight Watchers, Lavada Harvey, Nutrisystem, etc    Contributing factors: Poor Food Choices and Stress/Emotional Eating currently with ankle injury and doing PT but had to limit exercise  Associated symptoms: clothes do not fit    Goals:  Hydration:flavored water icee water 4, 2 or less coffee decaf w/creamer  Alcohol: extremely rare 1/2 drink  Exercise:3-4 times at gym(cardio and lifting) and PT  biking  Occupation:retired      Diet Recall  -meats , chicken , fish and veggies   -cant eat creamy foods, spicy, pasta  Colonoscopy-Completed    The following portions of the patient's history were reviewed and updated as appropriate:   She  has a past medical history of Cervical cancer (HCC) and GERD (gastroesophageal reflux disease)  She   Patient Active Problem List    Diagnosis Date Noted    Obesity, Class I, BMI 30-34 9 2022    Diarrhea following gastrointestinal surgery 2022    Gastroesophageal reflux disease 2022    H/O bariatric surgery 2022    Hypothyroidism 2022     She  has a past surgical history that includes Hysterectomy; Oophorectomy (Bilateral); Breast biopsy (Right);  section (1986); Urethral sling (2010); Gastric bypass (2011); Nose surgery; Other surgical history (2019); Colonoscopy (2019); and EGD (2019)  Her family history includes Breast cancer in her maternal grandmother; No Known Problems in her daughter, father, maternal aunt, maternal aunt, maternal aunt, maternal aunt, maternal aunt, maternal aunt, maternal aunt, maternal aunt, maternal aunt, maternal grandfather, mother, paternal aunt, paternal aunt, paternal aunt, paternal aunt, paternal aunt, paternal aunt, paternal grandfather, paternal grandmother, and sister; Thyroid disease in her family  She  reports that she has never smoked  She has never used smokeless tobacco  She reports previous alcohol use  She reports that she does not use drugs    Current Outpatient Medications   Medication Sig Dispense Refill    Calcium Carbonate-Vit D-Min (CALCIUM 1200 PO) Take by mouth      Estradiol 6 MG PLLT       levocetirizine (XYZAL) 5 MG tablet Daily      Loratadine (Claritin) 10 MG CAPS Take by mouth      magnesium oxide (MAG-OX) 400 mg tablet Take 400 mg by mouth      Multiple Vitamins-Minerals (BARIATRIC FUSION PO) Take by mouth      pantoprazole (PROTONIX) 40 mg tablet       phentermine (ADIPEX-P) 37 5 MG tablet Take 1/2 tablet a day for the first 2 weeks and then take 1 tablet a day 30 tablet 1    Synthroid 112 MCG tablet       Zenpep [id number] units CPEP 1 (ONE) CAPSULE 5 TABS DAILY      Vitamin D, Cholecalciferol, 25 MCG (1000 UT) TABS        No current facility-administered medications for this visit  Current Outpatient Medications on File Prior to Visit   Medication Sig    Calcium Carbonate-Vit D-Min (CALCIUM 1200 PO) Take by mouth    Estradiol 6 MG PLLT     levocetirizine (XYZAL) 5 MG tablet Daily    Loratadine (Claritin) 10 MG CAPS Take by mouth    magnesium oxide (MAG-OX) 400 mg tablet Take 400 mg by mouth    Multiple Vitamins-Minerals (BARIATRIC FUSION PO) Take by mouth    pantoprazole (PROTONIX) 40 mg tablet     Synthroid 112 MCG tablet     Zenpep 36933-369641 units CPEP 1 (ONE) CAPSULE 5 TABS DAILY    [DISCONTINUED] Testosterone 50 MG PLLT     Vitamin D, Cholecalciferol, 25 MCG (1000 UT) TABS     [DISCONTINUED] Ascorbic Acid (vitamin C) 1000 MG tablet     [DISCONTINUED] B Complex-C (SUPER B COMPLEX PO)     [DISCONTINUED] Biotin 42329 MCG TABS     [DISCONTINUED] cetirizine (ZyrTEC) 10 mg tablet     [DISCONTINUED] cyanocobalamin (VITAMIN B-12) 1000 MCG tablet Take 1 tablet by mouth    [DISCONTINUED] famotidine (PEPCID) 10 mg tablet famotidine   1 tablet by mouth before breakfast    [DISCONTINUED] famotidine (PEPCID) 20 mg tablet 40 mg 2 (two) times a day      [DISCONTINUED] ferrous gluconate (FERGON) 324 mg tablet ferrous gluconate 324 mg (38 mg iron) tablet (Patient not taking: Reported on 2/10/2022)    [DISCONTINUED] Multiple Vitamins-Minerals (ONE-A-DAY 50 PLUS PO)     [DISCONTINUED] Testosterone 50 MG PLLT COAST - testosterone 50mg pellet   testosterone 50mg pellet   one pellet    [DISCONTINUED] vitamin B-12 (VITAMIN B-12) 1,000 mcg tablet      No current facility-administered medications on file prior to visit  She is allergic to latex       Review of Systems   Constitutional: Negative for chills and fever    Respiratory: Negative for shortness of breath  Cardiovascular: Negative for chest pain and palpitations  Gastrointestinal: Positive for diarrhea, nausea and vomiting  Negative for abdominal pain and constipation  +GERD-better controlled   Genitourinary: Negative for difficulty urinating  Musculoskeletal: Negative for arthralgias and back pain  Ankle pain     Skin: Negative for rash  Psychiatric/Behavioral: Negative for dysphoric mood  The patient is not nervous/anxious  Objective:    /72   Pulse 66   Resp 16   Ht 5' 1" (1 549 m)   Wt 78 4 kg (172 lb 14 4 oz)   Breastfeeding No   BMI 32 67 kg/m²     Physical Exam  Vitals and nursing note reviewed  Constitutional:       General: She is not in acute distress  Appearance: She is well-developed  She is obese  HENT:      Head: Normocephalic and atraumatic  Eyes:      Conjunctiva/sclera: Conjunctivae normal    Neck:      Thyroid: No thyromegaly  Pulmonary:      Effort: Pulmonary effort is normal  No respiratory distress  Skin:     Findings: No rash (visible)  Neurological:      Mental Status: She is alert and oriented to person, place, and time     Psychiatric:         Behavior: Behavior normal

## 2022-06-23 NOTE — ASSESSMENT & PLAN NOTE
-Discussed options of HealthyCORE-Intensive Lifestyle Intervention Program, Very Low Calorie Diet-VLCD and Conservative Program and the role of weight loss medications  Gastric bypass: laparoscopic  Surgery Date: 2011 [facility] Dr Oleg Hurd  -Initial weight loss goal of 5-10% weight loss for improved health  - Labs reviewed from 3/21/22 all within acceptable limits    -Patient is interested in pursuing HealthyCORE-Intensive Lifestyle Intervention Program- she will call office to schedule  Goals:  -Food log and paper log given  -No sugary beverages  At least 64oz of water daily   -Increase physical activity by 10 minutes daily  Gradually increase physical activity to a goal of 5 days per week for 30 minutes of MODERATE intensity PLUS 2 days per week of FULL BODY resistance training-continue current gym routine  -Practice 30/60 rule    To start phentermine 37 5mg 1/2 tablet initially for 2 weeks  Last EKG reviewed and NSR  Phentermine has as potential side effects of increased heart rate, increased blood pressure, palpitations, headache, dizziness, insomnia, altered mood, abdominal upset, and dry mouth  Notify the provider with change in mood  ER with SI/HI  Phentermine should be stopped prior to surgery   Notify the provider with any changes in vision  -not interested in injectable medication at this time

## 2022-06-27 ENCOUNTER — OFFICE VISIT (OUTPATIENT)
Dept: PHYSICAL THERAPY | Facility: CLINIC | Age: 62
End: 2022-06-27
Payer: COMMERCIAL

## 2022-06-27 DIAGNOSIS — M79.671 RIGHT FOOT PAIN: Primary | ICD-10-CM

## 2022-06-27 PROCEDURE — 97140 MANUAL THERAPY 1/> REGIONS: CPT | Performed by: PHYSICAL THERAPIST

## 2022-06-27 NOTE — PROGRESS NOTES
Daily Note     Today's date: 2022  Patient name: Shay Ramirez  : 1960  MRN: [id number]  Referring provider: ALINE Ambrose  Dx:   Encounter Diagnosis     ICD-10-CM    1  Right foot pain  M79 671                   Subjective: "It's feeling much better "      Objective: See treatment diary below      Assessment: Increased mobility t/o TCJ noted today  Pt reports the modification to the orthotics have been helpful  Plan: Continue per plan of care        Precautions: none      Manuals          Laser to TCJ 16W 4' 16W 4' 16W 4' 16W 4'         TCJ susannah roberts MD, MD, MD, MD         TCJ distraction MD MD MD GONCALVES         Neuro Re-Ed                                                                                                        Ther Ex             Bike             Wall stretches (gastroc/soleus) 3x30" ea review 3x30" ea HEP         PF stretch   3x30" HEP         Orthotic modification  Added another  lat wedge added  lat wedge per pt request                                                                           Ther Activity

## 2022-07-07 ENCOUNTER — OFFICE VISIT (OUTPATIENT)
Dept: PHYSICAL THERAPY | Facility: CLINIC | Age: 62
End: 2022-07-07
Payer: COMMERCIAL

## 2022-07-07 DIAGNOSIS — M79.671 RIGHT FOOT PAIN: Primary | ICD-10-CM

## 2022-07-07 PROCEDURE — 97140 MANUAL THERAPY 1/> REGIONS: CPT | Performed by: PHYSICAL THERAPIST

## 2022-07-07 NOTE — PROGRESS NOTES
Daily Note     Today's date: 2022  Patient name: Terri Denis  : 1960  MRN: [id number]  Referring provider: ALINE Aguilar  Dx:   Encounter Diagnosis     ICD-10-CM    1  Right foot pain  M79 671                   Subjective: Pt reports she feeling good but was on her feet      Objective: See treatment diary below      Assessment: Initial stiffness into DF that improved after manual therapy  Pt reports all plantar pain has diminished  Plan: Continue per plan of care        Precautions: none      Manuals         Laser to TCJ 16W 4' 16W 4' 16W 4' 16W 4' 16W 4'        TCJ susannah Donato MD, MD, MD, MD, MD        TCERI distraction MD MD MD MD GONCALVES        TCERI distraction with grade 5    MD GONCALVES        DF MWM             Neuro Re-Ed                                                                                                        Ther Ex             Bike             Wall stretches (gastroc/soleus) 3x30" ea review 3x30" ea HEP         PF stretch   3x30" HEP         Orthotic modification  Added another  lat wedge added  lat wedge per pt request                                                                           Ther Activity

## 2022-07-11 ENCOUNTER — OFFICE VISIT (OUTPATIENT)
Dept: PHYSICAL THERAPY | Facility: CLINIC | Age: 62
End: 2022-07-11
Payer: COMMERCIAL

## 2022-07-11 DIAGNOSIS — M79.671 RIGHT FOOT PAIN: Primary | ICD-10-CM

## 2022-07-11 PROCEDURE — 97140 MANUAL THERAPY 1/> REGIONS: CPT | Performed by: PHYSICAL THERAPIST

## 2022-07-11 NOTE — PROGRESS NOTES
Daily Note     Today's date: 2022  Patient name: Tiffanie Alvarez  : 1960  MRN: [id number]  Referring provider: ALINE Meek  Dx:   Encounter Diagnosis     ICD-10-CM    1  Right foot pain  M79 671                   Subjective: Pt reports ankle is getting better  Objective: See treatment diary below      Assessment: Pt tolerating manual well, gastroc with some tightness, pt reports discomfort behind knee  Otherwise tolerating well  Education on single leg exercises at gym for strengthening  Plan: Continue per plan of care        Precautions: none      Manuals        Laser to TCJ 16W 4' 16W 4' 16W 4' 16W 4' 16W 4' 16W 4'       TCJ susannah GONCALVES MD, MD, MD, MD        Anisha roberts MD, MD, MD, MD, MD        TCJ distraction MD MD MD MD GONCALVES        TCJ distraction with grade 5    MD GONCALVES        DF MWM             Neuro Re-Ed                                                                                                        Ther Ex             Bike             Wall stretches (gastroc/soleus) 3x30" ea review 3x30" ea HEP         PF stretch   3x30" HEP         Orthotic modification  Added another  lat wedge added  lat wedge per pt request                                                                           Ther Activity

## 2022-07-20 ENCOUNTER — OFFICE VISIT (OUTPATIENT)
Dept: PHYSICAL THERAPY | Facility: CLINIC | Age: 62
End: 2022-07-20
Payer: COMMERCIAL

## 2022-07-20 DIAGNOSIS — M79.671 RIGHT FOOT PAIN: Primary | ICD-10-CM

## 2022-07-20 PROCEDURE — 97140 MANUAL THERAPY 1/> REGIONS: CPT | Performed by: PHYSICAL THERAPIST

## 2022-07-20 NOTE — PROGRESS NOTES
Daily Note     Today's date: 2022  Patient name: Curtis Townsend  : 1960  MRN: [id number]  Referring provider: ALINE Blake  Dx:   Encounter Diagnosis     ICD-10-CM    1  Right foot pain  M79 671                   Subjective: "My foot feels much better  The ankle still hurts once in awhile "      Objective: See treatment diary below      Assessment: Pt had good tolerance to all manual therapy  Pt would like to order an additional pair of orthotics with lateral flange  Plan: Continue per plan of care        Precautions: none      Manuals       Laser to TCJ 16W 4' 16W 4' 16W 4' 16W 4' 16W 4' 16W 4' 16W 4'      TCJ susannah GONCALVES MD, MD, MD, MD  MD Alize roberts MD, MD, MD, MD, MD  MD      TCJ distraction MD MD MD MD GONCALVES  MD      TCJ distraction with grade 5    MD MD СЕРГЕЙ GONCALVES      DF MWM             Neuro Re-Ed                                                                                                        Ther Ex             Bike             Wall stretches (gastroc/soleus) 3x30" ea review 3x30" ea HEP         PF stretch   3x30" HEP         Orthotic modification  Added another  lat wedge added  lat wedge per pt request                                                                           Ther Activity

## 2022-07-25 ENCOUNTER — OFFICE VISIT (OUTPATIENT)
Dept: PHYSICAL THERAPY | Facility: CLINIC | Age: 62
End: 2022-07-25
Payer: COMMERCIAL

## 2022-07-25 DIAGNOSIS — M79.671 RIGHT FOOT PAIN: Primary | ICD-10-CM

## 2022-07-25 PROCEDURE — 97140 MANUAL THERAPY 1/> REGIONS: CPT | Performed by: PHYSICAL THERAPIST

## 2022-07-25 NOTE — PROGRESS NOTES
Daily Note     Today's date: 2022  Patient name: Jimena Mayen  : 1960  MRN: [id number]  Referring provider: ALINE Stinson  Dx:   Encounter Diagnosis     ICD-10-CM    1  Right foot pain  M79 671                   Subjective: "I was on my feet all day yesterday  My ankle hurt a little but I took a Tylenol and it went away "      Objective: See treatment diary below      Assessment: Increased mobility after HVT  Pt denied pain t/o session  Plan: Continue per plan of care        Precautions: none      Manuals      Laser to TCJ 16W 4' 16W 4' 16W 4' 16W 4' 16W 4' 16W 4' 16W 4' 14W 4'     TCJ susannah RUSHING MD, MD     TCERI distraction MD MD MD MD MD СЕРГЕЙ GONCALVES MD     TCERI distraction with grade 5    MD MD СЕРГЕЙ GONCALVES MD     DF M             Neuro Re-Ed                                                                                                        Ther Ex             Bike             Wall stretches (gastroc/soleus) 3x30" ea review 3x30" ea HEP         PF stretch   3x30" HEP         Orthotic modification  Added another  lat wedge added  lat wedge per pt request                                                                           Ther Activity

## 2022-08-02 ENCOUNTER — OFFICE VISIT (OUTPATIENT)
Dept: PHYSICAL THERAPY | Facility: CLINIC | Age: 62
End: 2022-08-02
Payer: COMMERCIAL

## 2022-08-02 DIAGNOSIS — M79.671 RIGHT FOOT PAIN: Primary | ICD-10-CM

## 2022-08-02 PROCEDURE — L3010 FOOT LONGITUDINAL ARCH SUPPO: HCPCS | Performed by: PHYSICAL THERAPIST

## 2022-08-02 PROCEDURE — 97140 MANUAL THERAPY 1/> REGIONS: CPT | Performed by: PHYSICAL THERAPIST

## 2022-08-02 NOTE — PROGRESS NOTES
Daily Note     Today's date: 2022  Patient name: Vivian Crowley  : 1960  MRN: [id number]  Referring provider: ALINE Gonzalez  Dx:   Encounter Diagnosis     ICD-10-CM    1  Right foot pain  M79 671                   Subjective: Pt reports that she was on her feet all weekend and felt sore only  Objective: See treatment diary below      Assessment: Dispensed new orthotics with comfort  Good overall ankle mobility  Plan on DC next week  Plan: Anticipate DC [place]       Precautions: none      Manuals     Laser to [address] 4' 16W 4' 16W 4' 16W 4' 16W 4' 16W 4' 16W 4' 14W 4' 14W 4'    TCJ susannah RUSHING MD, MD, MD    TCERI distraction MD MD MD MD MD СЕРГЕЙ GONCALVES MD, MD    TCJ distraction with grade 5    MD MD СЕРГЕЙ GONCALVES MD, MD    DF MWM             Orthotics dispensed         MD    Neuro Re-Ed                                                                                                        Ther Ex             Bike             Wall stretches (gastroc/soleus) 3x30" ea review 3x30" ea HEP         PF stretch   3x30" HEP         Orthotic modification  Added another  lat wedge added  lat wedge per pt request                                                                           Ther Activity

## 2022-08-10 ENCOUNTER — HOSPITAL ENCOUNTER (OUTPATIENT)
Dept: RADIOLOGY | Age: 62
Discharge: HOME | End: 2022-08-10
Payer: COMMERCIAL

## 2022-08-10 ENCOUNTER — APPOINTMENT (OUTPATIENT)
Dept: LAB | Age: 62
End: 2022-08-10
Payer: COMMERCIAL

## 2022-08-10 VITALS — WEIGHT: 165 LBS | BODY MASS INDEX: 31.15 KG/M2 | HEIGHT: 61 IN

## 2022-08-10 DIAGNOSIS — R79.89 HIGH SERUM PARATHYROID HORMONE (PTH): ICD-10-CM

## 2022-08-10 DIAGNOSIS — K91.2 POSTSURGICAL MALABSORPTION: ICD-10-CM

## 2022-08-10 DIAGNOSIS — R79.89 LOW VITAMIN D LEVEL: ICD-10-CM

## 2022-08-10 DIAGNOSIS — Z98.84 BARIATRIC SURGERY STATUS: ICD-10-CM

## 2022-08-10 DIAGNOSIS — Z12.31 SCREENING MAMMOGRAM FOR HIGH-RISK PATIENT: ICD-10-CM

## 2022-08-10 LAB
25(OH)D3 SERPL-MCNC: 44.4 NG/ML (ref 30–100)
PTH-INTACT SERPL-MCNC: 84.1 PG/ML (ref 18.4–80.1)

## 2022-08-10 PROCEDURE — 82306 VITAMIN D 25 HYDROXY: CPT

## 2022-08-10 PROCEDURE — 77063 BREAST TOMOSYNTHESIS BI: CPT

## 2022-08-10 PROCEDURE — 77067 SCR MAMMO BI INCL CAD: CPT

## 2022-08-10 PROCEDURE — 83970 ASSAY OF PARATHORMONE: CPT

## 2022-08-10 PROCEDURE — 36415 COLL VENOUS BLD VENIPUNCTURE: CPT

## 2022-08-11 ENCOUNTER — OFFICE VISIT (OUTPATIENT)
Dept: PHYSICAL THERAPY | Facility: CLINIC | Age: 62
End: 2022-08-11
Payer: COMMERCIAL

## 2022-08-11 DIAGNOSIS — E21.3 HYPERPARATHYROIDISM (HCC): ICD-10-CM

## 2022-08-11 DIAGNOSIS — Z98.84 BARIATRIC SURGERY STATUS: Primary | ICD-10-CM

## 2022-08-11 DIAGNOSIS — M79.671 RIGHT FOOT PAIN: Primary | ICD-10-CM

## 2022-08-11 DIAGNOSIS — K91.2 POSTSURGICAL MALABSORPTION: ICD-10-CM

## 2022-08-11 PROCEDURE — 97140 MANUAL THERAPY 1/> REGIONS: CPT | Performed by: PHYSICAL THERAPIST

## 2022-08-11 NOTE — PROGRESS NOTES
Daily Note     Today's date: 2022  Patient name: Caprice Aguirre  : 1960  MRN: [id number]  Referring provider: ALINE Mg  Dx:   Encounter Diagnosis     ICD-10-CM    1  Right foot pain  M79 671                   Subjective: Pt reports that new orthotics are working well  Objective: See treatment diary below      Assessment: Pt had good knowledge of HEP and will transition to independent program at this time  Plan: No further skilled PT required       Precautions: none      Manuals    Laser to TCJ 16W 4' 16W 4' 16W 4' 16W 4' 16W 4' 16W 4' 16W 4' 14W 4' 14W 4' 14W 4'   TCJ glides MD MD MD MD MD FH MD MD MD MD Emmit Sandhoff MD MD MD MD MD FH MD MD MD MD   TCJ distraction MD MD MD MD MD СЕРГЕЙ GONCALVES MD, MD, MD   TCJ distraction with grade 5    MD MD СЕРГЕЙ GONCALVES MD, MD, MD   DF MWM             Orthotics dispensed         MD    Neuro Re-Ed                                                                                                        Ther Ex             Bike             Wall stretches (gastroc/soleus) 3x30" ea review 3x30" ea HEP         PF stretch   3x30" HEP         Orthotic modification  Added another  lat wedge added  lat wedge per pt request                                                                           Ther Activity

## 2022-08-12 ENCOUNTER — TELEPHONE (OUTPATIENT)
Dept: BARIATRICS | Facility: CLINIC | Age: 62
End: 2022-08-12

## 2022-08-12 NOTE — TELEPHONE ENCOUNTER
----- Message from Raffi Em PA-C sent at 8/11/2022  1:33 PM EDT -----  Can you please call patient  Her vit D is now normal but her parathyroid hormone is still mildly elevated  If she is not already, please make sure she is taking at least 1500 mg of calcium citrate daily  Will repeat her PTH in 3 months, if remains elevated despite increasing calcium we can discuss referral to endocrinology  Thanks!

## 2022-08-12 NOTE — TELEPHONE ENCOUNTER
Reached out to Pt re: lab results  Explained PA instructions  Pt requested PA look over her entire med list to ensure appropriate dosages, as Pt is concerned by her hormone levels in general  Assured Pt I would send message  Pt was in agreement with plan

## 2022-08-17 ENCOUNTER — APPOINTMENT (OUTPATIENT)
Dept: RADIOLOGY | Age: 62
End: 2022-08-17
Payer: COMMERCIAL

## 2022-08-17 ENCOUNTER — OFFICE VISIT (OUTPATIENT)
Dept: PODIATRY | Facility: CLINIC | Age: 62
End: 2022-08-17
Payer: COMMERCIAL

## 2022-08-17 VITALS
HEIGHT: 60 IN | WEIGHT: 165 LBS | SYSTOLIC BLOOD PRESSURE: 129 MMHG | HEART RATE: 105 BPM | BODY MASS INDEX: 32.39 KG/M2 | DIASTOLIC BLOOD PRESSURE: 84 MMHG

## 2022-08-17 DIAGNOSIS — M21.41 PES PLANUS OF BOTH FEET: Primary | ICD-10-CM

## 2022-08-17 DIAGNOSIS — M77.41 METATARSALGIA OF BOTH FEET: ICD-10-CM

## 2022-08-17 DIAGNOSIS — M21.42 PES PLANUS OF BOTH FEET: ICD-10-CM

## 2022-08-17 DIAGNOSIS — M21.962 FOOT DEFORMITY, BILATERAL: ICD-10-CM

## 2022-08-17 DIAGNOSIS — M21.961 FOOT DEFORMITY, BILATERAL: ICD-10-CM

## 2022-08-17 DIAGNOSIS — M21.41 PES PLANUS OF BOTH FEET: ICD-10-CM

## 2022-08-17 DIAGNOSIS — M77.42 METATARSALGIA OF BOTH FEET: ICD-10-CM

## 2022-08-17 DIAGNOSIS — M21.42 PES PLANUS OF BOTH FEET: Primary | ICD-10-CM

## 2022-08-17 PROCEDURE — 73630 X-RAY EXAM OF FOOT: CPT

## 2022-08-17 PROCEDURE — 99213 OFFICE O/P EST LOW 20 MIN: CPT | Performed by: PODIATRIST

## 2022-08-17 NOTE — PROGRESS NOTES
Assessment/Plan:        Diagnoses and all orders for this visit:    Pes planus of both feet  -     X-ray foot right 3+ views; Future    Foot deformity, bilateral  -     X-ray foot right 3+ views; Future    Metatarsalgia of both feet      Diagnosis and options discussed with patient  Patient agreeable to the plan as stated below    Patient chronic pain is much improved with her custom orthotics, she is pleased with them currently    She has some mild anterior right ankle/dorsal rearfoot achiness exacerbated with activity  XR reviewed, no acute findings  Mild N-C joint degeneration  Voltaren gel, make sure shoes aren't too tight    Subjective:      Patient ID: Jeanette Kent is a 58 y o  female  Patient was prescribed custom orthotics for flatfoot, forefoot pain  She has had them adjusted a few times and now they "feel right " She is getting a sensation on the front of her right ankle like "it's stuck " The therapist has been trying to stretch the muscles  It is slowly improving  The following portions of the patient's history were reviewed and updated as appropriate: allergies, current medications, past family history, past medical history, past social history, past surgical history and problem list     Review of Systems    Constitutional: Negative  HENT: Negative for sinus pressure and sinus pain  Respiratory: Negative for cough and shortness of breath  Cardiovascular: Negative for chest pain and leg swelling  Gastrointestinal: Negative for diarrhea, nausea and vomiting  Musculoskeletal: right anterior ankle pain, flat feet  Skin: Negative for color change  Negative for rash and wound  Neurological: Negative for weakness, numbness and headaches  Psychiatric/Behavioral: The patient is not nervous/anxious  Objective:      /84   Pulse 105   Ht 5' (1 524 m)   Wt 74 8 kg (165 lb)   BMI 32 22 kg/m²          Physical Exam    Vitals reviewed     Constitutional:       Appearance: Patient is not ill-appearing or diaphoretic  Patient is obese  HENT:      Nose: No congestion or rhinorrhea  Cardiovascular:      Rate and Rhythm: Normal rate  Pulses: Normal pulses  Dorsalis pedis pulses are 2+ on the right side and 2+ on the left side  Posterior tibial pulses are 2+ on the right side and 2+ on the left side  Pulmonary:      Effort: Pulmonary effort is normal  No respiratory distress  Musculoskeletal:      Right lower leg: No edema  Left lower leg: No edema  Right foot:      Mild anterior ankle tenderness over TN joint  Extensor tendons intact with pain or weakness  No pain with TN joint manipulation, normal TM STJ and ankle ROM  Pes planus on stance          Skin:     Capillary Refill: Capillary refill takes less than 2 seconds  Findings: No bruising, erythema, lesion or rash  Toenail Condition: Toenails are normal    Neurological:      Mental Status: Alert and oriented to person, place, and time  Motor: No weakness  Gait: Gait normal        Right Protective Sensation: 10 sites tested  10 sites sensed  Left  Protective Sensation: 10 sites tested  10 sites sensed  Psychiatric:         Mood and Affect: Mood normal        XRay 3 views of the right foot personally read by Dr Neha Gilliland in office today and discussed with patient:  1  No fracture or cortical reaction  2  Pes planus noted  3  Mild degeneration N-C joint

## 2022-08-26 ENCOUNTER — OFFICE VISIT (OUTPATIENT)
Dept: BARIATRICS | Facility: CLINIC | Age: 62
End: 2022-08-26
Payer: COMMERCIAL

## 2022-08-26 VITALS
WEIGHT: 169.89 LBS | HEART RATE: 88 BPM | HEIGHT: 61 IN | BODY MASS INDEX: 32.07 KG/M2 | RESPIRATION RATE: 16 BRPM | SYSTOLIC BLOOD PRESSURE: 130 MMHG | DIASTOLIC BLOOD PRESSURE: 82 MMHG

## 2022-08-26 DIAGNOSIS — E03.9 HYPOTHYROIDISM: Primary | ICD-10-CM

## 2022-08-26 DIAGNOSIS — E66.9 OBESITY, CLASS I, BMI 30-34.9: ICD-10-CM

## 2022-08-26 DIAGNOSIS — K21.9 GASTROESOPHAGEAL REFLUX DISEASE: ICD-10-CM

## 2022-08-26 PROCEDURE — 99214 OFFICE O/P EST MOD 30 MIN: CPT | Performed by: PHYSICIAN ASSISTANT

## 2022-08-26 NOTE — PROGRESS NOTES
Assessment/Plan:    Obesity, Class I, BMI 30-34 9  -Patient is pursuing conservative program  -Initial weight loss goal of 5-10% weight loss for improved health  -Screening labs 3/21/22    Initial:172 9  VWBQDUQ:802 6  Change:-3  Goal:    Make sure to get protein in your breakfast  Recommend taking 1/2 phentermine in the morning and 1/2 around none  Continue to go to the 4 times a week  Continue to drink at least 60 oz water daily      Hypothyroidism  tsh 10/3/21 wnl    -continue management with prescribing provider      Gastroesophageal reflux disease  Has had recent EGD and to continue pantoprazole and GI follow up        Follow up in approximately 2 months with Non-Surgical Physician/Advanced Practitioner  Diagnoses and all orders for this visit:    Hypothyroidism    Obesity, Class I, BMI 30-34 9    Gastroesophageal reflux disease          Subjective:   Chief Complaint   Patient presents with    Follow-up     MWM 2mth post op wt gain f/u; waist 38in        Patient ID: Henrique Moctezuma  is a 58 y o  female with excess weight/obesity here to pursue weight managment  Patient is pursuing conservative program    HPI Here for follow up of post op weight gain   She is s/p of Alex-En-Y Gastric Bypass with Dr Neil Landaverde in 2011  She was started on phentermine and did change her diet and is exercising more  She feels better  She thought she had lost more weight though  She is currently on prednisone for posion ivy and was treated with it prior for an additional 2 weeks  With changes to her diet and vitamins she has noticed BM are now more regular and firm than before  She does avoid butter, oily foods, dairy, creamy sauces      Wt Readings from Last 10 Encounters:   08/26/22 77 1 kg (169 lb 14 2 oz)   08/17/22 74 8 kg (165 lb)   08/10/22 74 8 kg (165 lb)   08/08/22 74 8 kg (165 lb)   06/23/22 78 4 kg (172 lb 14 4 oz)   06/20/22 78 kg (172 lb)   06/16/22 78 2 kg (172 lb 8 oz)   02/10/22 77 3 kg (170 lb 8 oz) 21 74 8 kg (165 lb)   21 75 3 kg (166 lb)       Food logging: yes has decreased on portion size  Increased appetite/cravings:sometimes after 6 PM feels increased hunger  Fruit/Vegetable servings:  Exercise: 4-5 times qa week gym  20 minutes of cardio and then some type of weight training routine  Hydration: ICEE eater 4-6 a day a day      Colonoscopy-Completed    The following portions of the patient's history were reviewed and updated as appropriate:   She  has a past medical history of Cervical cancer ([address] ) and GERD (gastroesophageal reflux disease)  She   Patient Active Problem List    Diagnosis Date Noted    Obesity, Class I, BMI 30-34 9 2022    Diarrhea following gastrointestinal surgery 2022    Gastroesophageal reflux disease 2022    H/O bariatric surgery 2022    Hypothyroidism 2022     She  has a past surgical history that includes Hysterectomy; Oophorectomy (Bilateral); Breast biopsy (Right);  section (1986); Urethral sling (2010); Gastric bypass (2011); Nose surgery; Other surgical history (2019); Colonoscopy (2019); and EGD (2019)  Her family history includes Breast cancer in her maternal grandmother; No Known Problems in her daughter, father, maternal aunt, maternal aunt, maternal aunt, maternal aunt, maternal aunt, maternal aunt, maternal aunt, maternal aunt, maternal aunt, maternal grandfather, mother, paternal aunt, paternal aunt, paternal aunt, paternal aunt, paternal aunt, paternal aunt, paternal grandfather, paternal grandmother, and sister; Thyroid disease in her family  She  reports that she has never smoked  She has never used smokeless tobacco  She reports previous alcohol use  She reports that she does not use drugs    Current Outpatient Medications   Medication Sig Dispense Refill    Calcium Carbonate-Vit D-Min (CALCIUM 1200 PO) Take by mouth      Estradiol 6 MG PLLT       levocetirizine (XYZAL) 5 MG tablet Daily      Loratadine 10 MG CAPS Take by mouth      magnesium oxide (MAG-OX) 400 mg tablet Take 400 mg by mouth      Multiple Vitamins-Minerals (BARIATRIC FUSION PO) Take by mouth      pantoprazole (PROTONIX) 40 mg tablet       phentermine (ADIPEX-P) 37 5 MG tablet take 1 tablet a day 30 tablet 0    Synthroid 112 MCG tablet       Zenpep 20946-042755 units CPEP 1 (ONE) CAPSULE 5 TABS DAILY      Vitamin D, Cholecalciferol, 25 MCG (1000 UT) TABS        No current facility-administered medications for this visit  Current Outpatient Medications on File Prior to Visit   Medication Sig    Calcium Carbonate-Vit D-Min (CALCIUM 1200 PO) Take by mouth    Estradiol 6 MG PLLT     levocetirizine (XYZAL) 5 MG tablet Daily    Loratadine 10 MG CAPS Take by mouth    magnesium oxide (MAG-OX) 400 mg tablet Take 400 mg by mouth    Multiple Vitamins-Minerals (BARIATRIC FUSION PO) Take by mouth    pantoprazole (PROTONIX) 40 mg tablet     phentermine (ADIPEX-P) 37 5 MG tablet take 1 tablet a day    Synthroid 112 MCG tablet     Zenpep 05803-475361 units CPEP 1 (ONE) CAPSULE 5 TABS DAILY    Vitamin D, Cholecalciferol, 25 MCG (1000 UT) TABS      No current facility-administered medications on file prior to visit  She is allergic to latex       Review of Systems   Constitutional: Negative for chills and fever  Respiratory: Negative for shortness of breath  Cardiovascular: Negative for chest pain and palpitations  Gastrointestinal: Negative for abdominal pain, constipation, diarrhea and vomiting  Genitourinary: Negative for difficulty urinating  Musculoskeletal: Positive for back pain, neck pain and neck stiffness  Negative for arthralgias  Skin: Negative for rash  Neurological: Negative for headaches  Psychiatric/Behavioral: Negative for dysphoric mood  The patient is not nervous/anxious          Objective:    /82   Pulse 88   Resp 16   Ht 5' 1" (1 549 m)   Wt 77 1 kg (169 lb 14 2 oz)   Breastfeeding No   BMI 32 10 kg/m²      Physical Exam  Vitals and nursing note reviewed  Constitutional:       General: She is not in acute distress  Appearance: She is well-developed  She is obese  HENT:      Head: Normocephalic and atraumatic  Eyes:      Conjunctiva/sclera: Conjunctivae normal    Neck:      Thyroid: No thyromegaly  Pulmonary:      Effort: Pulmonary effort is normal  No respiratory distress  Skin:     Findings: No rash (visible)  Neurological:      Mental Status: She is alert and oriented to person, place, and time     Psychiatric:         Mood and Affect: Mood normal          Behavior: Behavior normal

## 2022-08-26 NOTE — ASSESSMENT & PLAN NOTE
-Patient is pursuing conservative program  -Initial weight loss goal of 5-10% weight loss for improved health  -Screening labs 3/21/22    Initial:172 9  Current:169 9  Change:-3  Goal:    Make sure to get protein in your breakfast  Recommend taking 1/2 phentermine in the morning and 1/2 around none  Continue to go to the 4 times a week  Continue to drink at least 60 oz water daily

## 2022-08-26 NOTE — PATIENT INSTRUCTIONS
Make sure to get protein in your breakfast  Recommend taking 1/2 phentermine in the morning and 1/2 around none  Continue to go to the 4 times a week  Continue to drink at least 60 oz water daily    Try these alternative food options:  Protein shakes- Premier, Pure protein, Fairlife, Iconic  Lactose free protein shakes-  Fairlife, Ripple, Iconic, Plascencia  Protein bars- Quest, Pure protein  Ice cream- Josue's, Yasso, Enlightened, Halo Top   Chips- Quest protein chips, Cheese crisps   Bread- 647 wheat, Protein Keto bread, whole wheat yasir bread, low carb/high protein wraps  Pasta- Chickpea pasta, Pasta zero or similar  Rice- Cauliflower rice, quinoa, wild rice, brown rice  Fruits- berries, cantaloupe, peaches, apples, oranges  Yogurt- Ratio (25g protein), Skyr, Two good, Chobani 60 bethany or 100 bethany, Oikos triple zero  Sugar alternatives- Stevia, Monk fruit, Swerve

## 2022-08-30 PROCEDURE — 88342 IMHCHEM/IMCYTCHM 1ST ANTB: CPT | Performed by: PATHOLOGY

## 2022-08-30 PROCEDURE — 88341 IMHCHEM/IMCYTCHM EA ADD ANTB: CPT | Performed by: PATHOLOGY

## 2022-08-30 PROCEDURE — 88305 TISSUE EXAM BY PATHOLOGIST: CPT | Performed by: PATHOLOGY

## 2022-09-21 DIAGNOSIS — E66.9 OBESITY, CLASS I, BMI 30-34.9: ICD-10-CM

## 2022-09-22 RX ORDER — PHENTERMINE HYDROCHLORIDE 37.5 MG/1
TABLET ORAL
Qty: 30 TABLET | Refills: 0 | Status: SHIPPED | OUTPATIENT
Start: 2022-09-22

## 2022-10-11 PROBLEM — Z98.890 DIARRHEA FOLLOWING GASTROINTESTINAL SURGERY: Status: RESOLVED | Noted: 2022-06-20 | Resolved: 2022-10-11

## 2022-10-11 PROBLEM — R19.7 DIARRHEA FOLLOWING GASTROINTESTINAL SURGERY: Status: RESOLVED | Noted: 2022-06-20 | Resolved: 2022-10-11

## 2022-10-25 ENCOUNTER — TELEPHONE (OUTPATIENT)
Dept: OTHER | Facility: OTHER | Age: 62
End: 2022-10-25

## 2022-10-25 NOTE — TELEPHONE ENCOUNTER
Patient is calling regarding cancelling an appointment      Date/Time: 10/25/22 @ 10:00am    Patient was rescheduled: YES [] NO [x]    Patient requesting call back to reschedule: YES [x] NO []

## 2022-11-04 DIAGNOSIS — E66.9 OBESITY, CLASS I, BMI 30-34.9: ICD-10-CM

## 2022-11-04 RX ORDER — PHENTERMINE HYDROCHLORIDE 37.5 MG/1
TABLET ORAL
Qty: 30 TABLET | Refills: 0 | Status: SHIPPED | OUTPATIENT
Start: 2022-11-04

## 2022-12-01 ENCOUNTER — OFFICE VISIT (OUTPATIENT)
Dept: BARIATRICS | Facility: CLINIC | Age: 62
End: 2022-12-01

## 2022-12-01 VITALS
RESPIRATION RATE: 16 BRPM | WEIGHT: 169.8 LBS | DIASTOLIC BLOOD PRESSURE: 80 MMHG | BODY MASS INDEX: 32.06 KG/M2 | SYSTOLIC BLOOD PRESSURE: 132 MMHG | HEIGHT: 61 IN | HEART RATE: 95 BPM | OXYGEN SATURATION: 99 %

## 2022-12-01 DIAGNOSIS — E66.9 OBESITY, CLASS I, BMI 30-34.9: Primary | ICD-10-CM

## 2022-12-01 DIAGNOSIS — E34.9 ELEVATED PARATHYROID HORMONE: ICD-10-CM

## 2022-12-01 RX ORDER — TESTOSTERONE 50 MG
PELLET (EA) IMPLANTATION
COMMUNITY

## 2022-12-01 RX ORDER — TOPIRAMATE 25 MG/1
TABLET ORAL
Qty: 60 TABLET | Refills: 1 | Status: SHIPPED | OUTPATIENT
Start: 2022-12-01

## 2022-12-01 NOTE — PROGRESS NOTES
Assessment/Plan:    Obesity, Class I, BMI 30-34 9  -Patient is pursuing conservative program  -Initial weight loss goal of 5-10% weight loss for improved health  -Screening labs 3/21/22    Initial:172 9  Current:169 9  Change:-3  Goal:  -discussed resuming food tracking in the future  -doing well with protein intake  -Continue to go to the gym 4 times a week  -Continue to drink at least 60 oz water daily      To stop phentermine  Has lost less than 4 % BW and start on topamax (start weight 169 8 on 12/1/22)  Denies history of renal stones or glaucoma  Side effects discussed  Follow up in approximately 2 months with Non-Surgical Physician/Advanced Practitioner  Diagnoses and all orders for this visit:    Obesity, Class I, BMI 30-34 9  -     topiramate (Topamax) 25 mg tablet; Take 1 tablet at night for 2 weeks then increase to 2 tablets    Elevated parathyroid hormone  -     Ambulatory Referral to Endocrinology; Future    Other orders  -     Testosterone 50 MG PLLT          Subjective:   Chief Complaint   Patient presents with   • Follow-up     MWM 6-7 wk fu, waist 37 5        Patient ID: Gurdeep Fall  is a 58 y o  female with excess weight/obesity here to pursue weight managment  Patient is pursuing Conservative Program      HPI  Here for post op weight gain followup  She is s/p of Pedro Sher 2011  She did just have labs done and PTH was elevated and calcium was good  PTH was elevated in august also and vitamin D was good  She is still taking calcium 2 times a da  She is having achiness of her thighs and neck  Her gym routine does help with the pain for about a day but then it returns   She has cut back on her eating but does not his appetite is stronger around 3 pm  She is taking phentermine in the morning      Wt Readings from Last 10 Encounters:   12/01/22 77 kg (169 lb 12 8 oz)   11/15/22 74 8 kg (165 lb)   09/09/22 76 7 kg (169 lb)   08/30/22 76 7 kg (169 lb)   22 77 1 kg (169 lb 14 2 oz)   22 74 8 kg (165 lb)   08/10/22 74 8 kg (165 lb)   22 74 8 kg (165 lb)   22 78 4 kg (172 lb 14 4 oz)   22 78 kg (172 lb)       Food logging:not currently  Increased appetite/cravings:  Fruit/Vegetable servings:  Exercise:every other day  Hydration: icee water at least 4 a tday    Colonoscopy-Completed    The following portions of the patient's history were reviewed and updated as appropriate: She  has a past medical history of Cervical cancer ([address] ) and GERD (gastroesophageal reflux disease)  She   Patient Active Problem List    Diagnosis Date Noted   • Obesity, Class I, BMI 30-34 9 2022   • Gastroesophageal reflux disease 2022   • H/O bariatric surgery 2022   • Hypothyroidism 2022     She  has a past surgical history that includes Hysterectomy; Oophorectomy (Bilateral); Breast biopsy (Right);  section (1986); Urethral sling (2010); Gastric bypass (2011); Nose surgery; Other surgical history (2019); Colonoscopy (2019); and EGD (2019)  Her family history includes Breast cancer in her maternal grandmother; No Known Problems in her daughter, father, maternal aunt, maternal aunt, maternal aunt, maternal aunt, maternal aunt, maternal aunt, maternal aunt, maternal aunt, maternal aunt, maternal grandfather, mother, paternal aunt, paternal aunt, paternal aunt, paternal aunt, paternal aunt, paternal aunt, paternal grandfather, paternal grandmother, and sister; Thyroid disease in her family  She  reports that she has never smoked  She has never used smokeless tobacco  She reports that she does not currently use alcohol  She reports that she does not use drugs    Current Outpatient Medications   Medication Sig Dispense Refill   • Calcium Carbonate-Vit D-Min (CALCIUM 1200 PO) Take by mouth     • Estradiol 6 MG PLLT      • levocetirizine (XYZAL) 5 MG tablet Daily     • magnesium oxide (MAG-OX) 400 mg tablet Take 400 mg by mouth     • Multiple Vitamins-Minerals (BARIATRIC FUSION PO) Take by mouth     • pantoprazole (PROTONIX) 40 mg tablet      • Synthroid 112 MCG tablet      • Testosterone 50 MG PLLT      • topiramate (Topamax) 25 mg tablet Take 1 tablet at night for 2 weeks then increase to 2 tablets 60 tablet 1   • Zenpep [id number] units CPEP 1 (ONE) CAPSULE 5 TABS DAILY     • Vitamin D, Cholecalciferol, 25 MCG (1000 UT) TABS  (Patient not taking: Reported on 12/1/2022)       No current facility-administered medications for this visit  Current Outpatient Medications on File Prior to Visit   Medication Sig   • Calcium Carbonate-Vit D-Min (CALCIUM 1200 PO) Take by mouth   • Estradiol 6 MG PLLT    • levocetirizine (XYZAL) 5 MG tablet Daily   • magnesium oxide (MAG-OX) 400 mg tablet Take 400 mg by mouth   • Multiple Vitamins-Minerals (BARIATRIC FUSION PO) Take by mouth   • pantoprazole (PROTONIX) 40 mg tablet    • Synthroid 112 MCG tablet    • Testosterone 50 MG PLLT    • Zenpep 26933-426944 units CPEP 1 (ONE) CAPSULE 5 TABS DAILY   • [DISCONTINUED] Loratadine 10 MG CAPS Take by mouth   • [DISCONTINUED] phentermine (ADIPEX-P) 37 5 MG tablet TAKE 1 TABLET BY MOUTH EVERY DAY   • Vitamin D, Cholecalciferol, 25 MCG (1000 UT) TABS  (Patient not taking: Reported on 12/1/2022)     No current facility-administered medications on file prior to visit  She is allergic to latex       Review of Systems   Constitutional: Negative for chills and fever  Respiratory: Negative for shortness of breath  Cardiovascular: Negative for chest pain and palpitations  Gastrointestinal: Negative for abdominal pain, constipation, diarrhea and vomiting  Genitourinary: Negative for difficulty urinating  Musculoskeletal: Positive for neck pain  Negative for arthralgias and back pain  Skin: Negative for rash  Neurological: Negative for headaches  Psychiatric/Behavioral: Negative for dysphoric mood   The patient is not nervous/anxious  Objective:    /80   Pulse 95   Resp 16   Ht 5' 1" (1 549 m)   Wt 77 kg (169 lb 12 8 oz)   SpO2 99%   BMI 32 08 kg/m²      Physical Exam  Vitals and nursing note reviewed  Constitutional:       General: She is not in acute distress  Appearance: She is well-developed  She is obese  HENT:      Head: Normocephalic and atraumatic  Eyes:      Conjunctiva/sclera: Conjunctivae normal    Neck:      Thyroid: No thyromegaly  Pulmonary:      Effort: Pulmonary effort is normal  No respiratory distress  Skin:     Findings: No rash (visible)  Neurological:      Mental Status: She is alert and oriented to person, place, and time     Psychiatric:         Mood and Affect: Mood normal          Behavior: Behavior normal

## 2022-12-01 NOTE — ASSESSMENT & PLAN NOTE
-Patient is pursuing conservative program  -Initial weight loss goal of 5-10% weight loss for improved health  -Screening labs 3/21/22    Initial:172 9  Current:169 9  Change:-3  Goal:  -discussed resuming food tracking in the future  -doing well with protein intake  -Continue to go to the gym 4 times a week  -Continue to drink at least 60 oz water daily      To stop phentermine  Has lost less than 4 % BW and start on topamax (start weight 169 8 on 12/1/22)  Denies history of renal stones or glaucoma  Side effects discussed

## 2023-01-20 ENCOUNTER — CONSULT (OUTPATIENT)
Dept: ENDOCRINOLOGY | Facility: CLINIC | Age: 63
End: 2023-01-20

## 2023-01-20 VITALS
HEART RATE: 76 BPM | BODY MASS INDEX: 32.13 KG/M2 | SYSTOLIC BLOOD PRESSURE: 110 MMHG | WEIGHT: 170.2 LBS | DIASTOLIC BLOOD PRESSURE: 72 MMHG | HEIGHT: 61 IN

## 2023-01-20 DIAGNOSIS — Z78.0 POST-MENOPAUSAL: ICD-10-CM

## 2023-01-20 DIAGNOSIS — E21.3 HYPERPARATHYROIDISM (HCC): Primary | ICD-10-CM

## 2023-01-20 DIAGNOSIS — Z98.84 H/O BARIATRIC SURGERY: ICD-10-CM

## 2023-01-20 DIAGNOSIS — E34.9 ELEVATED PARATHYROID HORMONE: ICD-10-CM

## 2023-01-20 DIAGNOSIS — E03.9 HYPOTHYROIDISM, UNSPECIFIED TYPE: ICD-10-CM

## 2023-01-20 RX ORDER — PSEUDOEPHEDRINE HCL 30 MG
750 TABLET ORAL 3 TIMES DAILY
Qty: 270 TABLET | Refills: 3 | Status: SHIPPED | OUTPATIENT
Start: 2023-01-20

## 2023-01-20 RX ORDER — LORATADINE 10 MG/1
10 TABLET ORAL DAILY
COMMUNITY

## 2023-01-20 RX ORDER — PHENTERMINE HYDROCHLORIDE 37.5 MG/1
37.5 CAPSULE ORAL EVERY MORNING
COMMUNITY

## 2023-01-20 RX ORDER — LEVOTHYROXINE SODIUM 112 UG/1
112 TABLET ORAL DAILY
COMMUNITY

## 2023-01-20 NOTE — PROGRESS NOTES
New Patient Progress Note    CC: elevated parathyroid hormone    History of Present Illness:   59 yo F who is presenting at the referral of bariatric surgery team for elevated parathyroid hormone level  Per chart review, PTH level has been elevated since at least 3/2022  Citracal-D 2400mg daily, per [facility] listing this has calcium carbonate and calcium citrate blend  She was diagnosed with pancreatic insufficiency in 2022 by [facility] which pt reports is through [facility]  She reports having diarrhea since bariatric surgery with [facility] in   She is now taking zenpep for pancreatic insufficiency with every meal since May 2022  She reports diarrhea is now once a week, much better  Reports history of kidney stone probably 30 years ago  Goes to gym every day or every other day  She has noticed muscle weakness in her hands and feels that her bones are weaker  She reports lactose intolerance now  She thinks she had bone density evaluated in the past, but there is no DXA scan on our file  She takes phentermine and topiramate for weight loss through weight management center  Takes estradiol and testosterone pellets every 4 months through gynecologist in [place] who removed cervix, uterus, ovaries for cancer cells in cervix, per patient  Takes 112mcg levothyroxine daily for hypothyroidism    No History of external radiation, recent Iodine loading or radiological diagnostic studies  No difficulty with swallowing or breathing or change in voice      Patient Active Problem List   Diagnosis   • Gastroesophageal reflux disease   • H/O bariatric surgery   • Hypothyroidism   • Obesity, Class I, BMI 30-34 9     Past Medical History:   Diagnosis Date   • Cervical cancer ([address] )    • GERD (gastroesophageal reflux disease)       Past Surgical History:   Procedure Laterality Date   • BREAST BIOPSY Right    •  SECTION  1986   • COLONOSCOPY  2019    2nd procedure   • EGD  09/13/2019   • GASTRIC BYPASS  04/18/2011   • HYSTERECTOMY      Age 50   • NOSE SURGERY      removal of a growth in the right nostril   • OOPHORECTOMY Bilateral     Age 50   • OTHER SURGICAL HISTORY  04/23/2019    Cervical Intraepithelial Neioplasia Gr[address] 2 Removal   • URETHRAL SLING  02/12/2010      Family History   Problem Relation Age of Onset   • Breast cancer Maternal Grandmother         unknown age   • No Known Problems Mother    • No Known Problems Father    • No Known Problems Sister    • No Known Problems Daughter    • No Known Problems Maternal Grandfather    • No Known Problems Paternal Grandmother    • No Known Problems Paternal Grandfather    • No Known Problems Maternal Aunt    • No Known Problems Maternal Aunt    • No Known Problems Maternal Aunt    • No Known Problems Maternal Aunt    • No Known Problems Maternal Aunt    • No Known Problems Maternal Aunt    • No Known Problems Maternal Aunt    • No Known Problems Maternal Aunt    • No Known Problems Maternal Aunt    • No Known Problems Paternal Aunt    • No Known Problems Paternal Aunt    • No Known Problems Paternal Aunt    • No Known Problems Paternal Aunt    • No Known Problems Paternal Aunt    • No Known Problems Paternal Aunt    • Thyroid disease Family      Social History     Tobacco Use   • Smoking status: Never   • Smokeless tobacco: Never   Substance Use Topics   • Alcohol use: Not Currently     Allergies   Allergen Reactions   • Latex Rash       Review of Systems   Constitutional: Negative for fatigue and unexpected weight change  Cardiovascular: Negative for palpitations  Gastrointestinal: Negative for abdominal pain, constipation and diarrhea  Endocrine: Positive for cold intolerance (feels colder than she used to feel)  Negative for polydipsia and polyuria     Psychiatric/Behavioral:        Denies changes in memory or in anxiety         Current Outpatient Medications:   •  Calcium Citrate 250 MG TABS, Take 3 tablets (750 mg total) by mouth 3 (three) times a day With lunch, dinner, and at bedtime, Disp: 270 tablet, Rfl: 3  •  Estradiol 6 MG PLLT, 6 mg every 4 (four) months, Disp: , Rfl:   •  levocetirizine (XYZAL) 5 MG tablet, Take 5 mg by mouth every evening, Disp: , Rfl:   •  levothyroxine 112 mcg tablet, Take 112 mcg by mouth daily, Disp: , Rfl:   •  loratadine (CLARITIN) 10 mg tablet, Take 10 mg by mouth daily, Disp: , Rfl:   •  magnesium oxide (MAG-OX) 400 mg tablet, Take 400 mg by mouth daily at bedtime, Disp: , Rfl:   •  Multiple Vitamins-Minerals (BARIATRIC FUSION PO), Take 1 tablet by mouth daily, Disp: , Rfl:   •  pantoprazole (PROTONIX) 40 mg tablet, Take 40 mg by mouth daily, Disp: , Rfl:   •  phentermine 37 5 MG capsule, Take 37 5 mg by mouth every morning, Disp: , Rfl:   •  Testosterone 50 MG PLLT, 50 mg every 4 (four) months, Disp: , Rfl:   •  topiramate (TOPAMAX) 25 mg tablet, Take 2 tablets at night, Disp: 180 tablet, Rfl: 0  •  Zenpep [id number] units CPEP, Take 5 capsules by mouth daily, Disp: , Rfl:     Physical Exam:  Body mass index is 32 16 kg/m²  /72 (BP Location: Left arm, Patient Position: Sitting, Cuff Size: Standard)   Pulse 76   Ht 5' 1" (1 549 m)   Wt 77 2 kg (170 lb 3 2 oz)   BMI 32 16 kg/m²        Physical Exam  Vitals reviewed  Constitutional:       Appearance: She is well-developed  HENT:      Head: Normocephalic and atraumatic  Eyes:      General:         Right eye: No discharge  Left eye: No discharge  Cardiovascular:      Rate and Rhythm: Normal rate and regular rhythm  Heart sounds: Normal heart sounds  No murmur heard  No friction rub  No gallop  Pulmonary:      Effort: Pulmonary effort is normal  No respiratory distress  Breath sounds: Normal breath sounds  No wheezing or rales  Chest:      Chest wall: No tenderness  Abdominal:      General: Bowel sounds are normal  There is no distension  Palpations: Abdomen is soft  There is no mass  Tenderness: There is no abdominal tenderness  Musculoskeletal:         General: Normal range of motion  Cervical back: Normal range of motion and neck supple  Skin:     General: Skin is warm and dry  Neurological:      Mental Status: She is alert and oriented to person, place, and time  Psychiatric:         Behavior: Behavior normal          Labs:     Lab Results   Component Value Date    FTP1EPNXDUAB 0 930 10/03/2021       Lab Results   Component Value Date    CREATININE 0 76 03/21/2022    CREATININE 0 69 11/14/2021    CREATININE 0 66 01/19/2021    BUN 13 03/21/2022     02/16/2015    K 4 0 03/21/2022     03/21/2022    CO2 25 03/21/2022     eGFR   Date Value Ref Range Status   03/21/2022 84 ml/min/1 73sq m Final       Lab Results   Component Value Date    ALT 25 03/21/2022    AST 18 03/21/2022    ALKPHOS 65 03/21/2022    BILITOT 0 36 02/16/2015       Lab Results   Component Value Date    CHOLESTEROL 170 01/19/2021    CHOLESTEROL 173 01/17/2020    CHOLESTEROL 163 10/22/2017     Lab Results   Component Value Date    HDL 56 01/19/2021    HDL 64 01/17/2020    HDL 70 (H) 10/22/2017     Lab Results   Component Value Date    TRIG 167 (H) 01/19/2021    TRIG 112 01/17/2020    TRIG 127 10/22/2017     Lab Results   Component Value Date    [place] 114 01/19/2021    [place] 109 01/17/2020         Impression:  1  Hyperparathyroidism ([facility] )    2  Elevated parathyroid hormone    3  Post-menopausal    4  Hypothyroidism, unspecified type         Plan:    Diagnoses and all orders for this visit:    Hyperparathyroidism ([facility] )  -     PTH, intact- Lab Collect; Future  -     Vitamin D 25 hydroxy Lab Collect; Future  -     Calcium Lab Collect; Future  -     Albumin Lab Collect; Future  -     Calcium Citrate 250 MG TABS; Take 3 tablets (750 mg total) by mouth 3 (three) times a day With lunch, dinner, and at bedtime  -     DXA bone density spine hip and pelvis; Future  Suspected secondary hyperparathyroidism   Current calcium supplementation is citracal-D which is predominantly calcium carbondate, pt is takign 1200mg BID  Will switch to calcium citrate 750mg three times per day, lunch, dinner, bedtime so as not to interfere with levothyroxine absorption  Recommended over the counter vitamin D given discontinuation of combination tablet  Recheck labs in 2 months, can consider 24h urine collection at that time if needed  Evaluate DXA scan including forearm    Elevated parathyroid hormone  -     Ambulatory Referral to Endocrinology    Post-menopausal  -     DXA bone density spine hip and pelvis; Future    Hypothyroidism, unspecified type  -     TSH, 3rd generation Lab Collect; Future  -     T4, free Lab Collect; Future  Last TSH 10/2021, recheck with next set of labs, currently on 112mcg levothyroxine daily  I have spent 45 minutes with patient today in which greater than 50% of this time was spent in counseling/coordination of care  All questioned fully answered  She will call me if any problems arise  Educated/ Counseled patient on diagnostic test results, prognosis, risk vs benefit of treatment options, importance of treatment compliance, healthy life and lifestyle choices

## 2023-01-20 NOTE — PATIENT INSTRUCTIONS
We will switch to 500mg calcium citrate 3 times per day, 750mg with lunch, dinner, and at bedtime  Please take vitamin D 1000 units over the counter in addition to the calcium citrate (because we are discontinuing your citracal-d which also contains vitamin D)  Get your labwork done in 2 months to see if your parathyroid hormone level is decreased with the change in calcium supplements  Schedule the DXA scan at your convenience

## 2023-01-24 ENCOUNTER — APPOINTMENT (OUTPATIENT)
Dept: LAB | Facility: IMAGING CENTER | Age: 63
End: 2023-01-24

## 2023-01-24 ENCOUNTER — HOSPITAL ENCOUNTER (OUTPATIENT)
Dept: RADIOLOGY | Facility: IMAGING CENTER | Age: 63
Discharge: HOME | End: 2023-01-24

## 2023-01-24 DIAGNOSIS — Z78.0 POST-MENOPAUSAL: ICD-10-CM

## 2023-01-24 DIAGNOSIS — E34.9 ELEVATED PARATHYROID HORMONE: ICD-10-CM

## 2023-01-24 DIAGNOSIS — E21.3 HYPERPARATHYROIDISM (HCC): ICD-10-CM

## 2023-01-24 DIAGNOSIS — Z98.84 BARIATRIC SURGERY STATUS: ICD-10-CM

## 2023-01-24 DIAGNOSIS — K91.2 POSTSURGICAL MALABSORPTION: ICD-10-CM

## 2023-01-24 LAB
CALCIUM SERPL-MCNC: 9.1 MG/DL (ref 8.3–10.1)
PTH-INTACT SERPL-MCNC: 95.6 PG/ML (ref 18.4–80.1)

## 2023-01-25 ENCOUNTER — TELEPHONE (OUTPATIENT)
Dept: ENDOCRINOLOGY | Facility: CLINIC | Age: 63
End: 2023-01-25

## 2023-01-25 ENCOUNTER — TELEPHONE (OUTPATIENT)
Dept: BARIATRICS | Facility: CLINIC | Age: 63
End: 2023-01-25

## 2023-01-25 NOTE — TELEPHONE ENCOUNTER
----- Message from Rudi Alanis PA-C sent at 1/25/2023  8:54 AM EST -----  Please let pt know her PTH remains elevated however I see she has seen endocrinology, please gave her follow their recommendations and continue follow up with them regarding this level

## 2023-01-25 NOTE — TELEPHONE ENCOUNTER
----- Message from Thomas Mack DO sent at 1/24/2023 12:17 PM EST -----  Please call patient with results  DXA scan was reviewed and shows low bone mass or "osteopenia" but no osteoporosis  There is no recommendation to treat with specific medications at this time besides the changes we have made to optimize calcium and vitamin D at our most recent visit  Additionally we would recommend continuing weight bearing exercise      Sent via [address]

## 2023-01-25 NOTE — TELEPHONE ENCOUNTER
Spoke to pt let her know blood results, she is F/U with Endocrinology  She did bone density test to  & is taking Calcium citrate for 2 month

## 2023-02-02 ENCOUNTER — TELEPHONE (OUTPATIENT)
Dept: OTHER | Facility: OTHER | Age: 63
End: 2023-02-02

## 2023-02-02 NOTE — TELEPHONE ENCOUNTER
Patient is calling regarding cancelling an appointment      Date/Time: 2/2/23 9:00     Patient was rescheduled: YES [] NO [x]    Patient requesting call back to reschedule: YES [x] NO []

## 2023-02-12 ENCOUNTER — APPOINTMENT (OUTPATIENT)
Dept: LAB | Age: 63
End: 2023-02-12

## 2023-02-12 DIAGNOSIS — N39.0 URINARY TRACT INFECTION WITH HEMATURIA, SITE UNSPECIFIED: ICD-10-CM

## 2023-02-12 DIAGNOSIS — R31.9 URINARY TRACT INFECTION WITH HEMATURIA, SITE UNSPECIFIED: ICD-10-CM

## 2023-02-15 DIAGNOSIS — E21.3 HYPERPARATHYROIDISM (HCC): ICD-10-CM

## 2023-02-15 DIAGNOSIS — E34.9 ELEVATED PARATHYROID HORMONE: ICD-10-CM

## 2023-02-15 LAB — BACTERIA UR CULT: ABNORMAL

## 2023-02-15 RX ORDER — PSEUDOEPHEDRINE HCL 30 MG
750 TABLET ORAL 3 TIMES DAILY
Qty: 810 TABLET | Refills: 1 | Status: SHIPPED | OUTPATIENT
Start: 2023-02-15

## 2023-02-21 ENCOUNTER — OFFICE VISIT (OUTPATIENT)
Dept: BARIATRICS | Facility: CLINIC | Age: 63
End: 2023-02-21

## 2023-02-21 VITALS
RESPIRATION RATE: 16 BRPM | BODY MASS INDEX: 31.45 KG/M2 | HEIGHT: 61 IN | WEIGHT: 166.6 LBS | HEART RATE: 70 BPM | SYSTOLIC BLOOD PRESSURE: 124 MMHG | DIASTOLIC BLOOD PRESSURE: 70 MMHG

## 2023-02-21 DIAGNOSIS — K86.89 PANCREATIC INSUFFICIENCY: ICD-10-CM

## 2023-02-21 DIAGNOSIS — E66.9 OBESITY, CLASS I, BMI 30-34.9: ICD-10-CM

## 2023-02-21 DIAGNOSIS — E21.3 HYPERPARATHYROIDISM (HCC): ICD-10-CM

## 2023-02-21 DIAGNOSIS — E03.9 HYPOTHYROIDISM: ICD-10-CM

## 2023-02-21 DIAGNOSIS — Z98.84 BARIATRIC SURGERY STATUS: ICD-10-CM

## 2023-02-21 DIAGNOSIS — Z48.815 ENCOUNTER FOR SURGICAL AFTERCARE FOLLOWING SURGERY OF DIGESTIVE SYSTEM: Primary | ICD-10-CM

## 2023-02-21 DIAGNOSIS — K21.9 GASTROESOPHAGEAL REFLUX DISEASE: ICD-10-CM

## 2023-02-21 DIAGNOSIS — K91.2 POSTSURGICAL MALABSORPTION: ICD-10-CM

## 2023-02-21 NOTE — PROGRESS NOTES
Assessment/Plan:     Patient ID: Nacho Bledsoe is a 58 y o  female  Bariatric Surgery Status    -s/p Alex-En-Y Gastric Bypass with Dr Citlaly Swanson in 2011 Presents to the office today for annual   She continue to follow with our MWM program and is on topamax currently  Pancreatic Insufficiency   Of note, pt presented last year with c/o "dumping" sxs almost daily  She was being followed by GI at [facility]  She states she also developed lactose intolerance and intolerance to many other foods- develops uncomfortable bloating feeling and diarrhea mostly with pastas, breads, dairy, veggies, etc  She underwent EGD last year with Dr Suzy Neil which showed unremarkable bypass anatomy  She reports since this time, GI told her pancreas was not producing enough elastase and was started on medication  She reports a significant improvement in her sxs with use of pancreatic medications and cutting out certain foods  Continue GI follow up     Hyperparathyroidism   - was seen by endocrinology - had her calcium supplements adjusted to calcium citrate 750 mg TID with plan to repeat her labs  Continue vit D as well  · Continued/Maintain healthy weight loss with good nutrition intakes  · Adequate hydration with at least 64oz  fluid intake  · Follow diet as discussed  · Follow vitamin and mineral recommendations as reviewed with you  · Exercise as tolerated  · Colonoscopy referral made: utd 2022  · Mammo:     · Follow-up in 1 year  We kindly ask that your arrive 15 minutes before your scheduled appointment time with your provider to allow our staff to room you, get your vital signs and update your chart  · Get lab work done  Please call the office if you need a script  It is recommended to check with your insurance BEFORE getting labs done to make sure they are covered by your policy        · Call our office if you have any problems with abdominal pain especially associated with fever, chills, nausea, vomiting or any other concerns  · All  Post-bariatric surgery patients should be aware that very small quantities of any alcohol can cause impairment and it is very possible not to feel the effect  The effect can be in the system for several hours  It is also a stomach irritant  · It is advised to AVOID alcohol, Nonsteroidal antiinflammatory drugs (NSAIDS) and nicotine of all forms   Any of these can cause stomach irritation/pain  · Discussed the effects of alcohol on a bariatric patient and the increased impairment risk  · Keep up the good work! Postsurgical Malabsorption   -At risk for malabsorption of vitamins/minerals secondary to malabsorption and restriction of intake from bariatric surgery  -Currently taking adequate postop bariatric surgery vitamin supplementation  -Last set of bariatric labs completed 3/2022  -Next set of bariatric labs ordered   - PTH and vit D deferred to endo   -Patient received education about the importance of adhering to a lifelong supplementation regimen to avoid vitamin/mineral deficiencies      Diagnoses and all orders for this visit:    Encounter for surgical aftercare following surgery of digestive system  -     Zinc; Future  -     Vitamin B12; Future  -     Vitamin B1, whole blood; Future  -     Vitamin A; Future  -     CBC and Platelet; Future  -     Comprehensive metabolic panel; Future  -     Iron Saturation %; Future    Bariatric surgery status  -     Zinc; Future  -     Vitamin B12; Future  -     Vitamin B1, whole blood; Future  -     Vitamin A; Future  -     CBC and Platelet; Future  -     Comprehensive metabolic panel; Future  -     Iron Saturation %; Future    Postsurgical malabsorption  -     Zinc; Future  -     Vitamin B12; Future  -     Vitamin B1, whole blood; Future  -     Vitamin A; Future  -     CBC and Platelet; Future  -     Comprehensive metabolic panel; Future  -     Iron Saturation %;  Future    Obesity, Class I, BMI 30-34 9  -     Zinc; Future  -     Vitamin B12; Future  -     Vitamin B1, whole blood; Future  -     Vitamin A; Future  -     CBC and Platelet; Future  -     Comprehensive metabolic panel; Future  -     Iron Saturation %; Future    Gastroesophageal reflux disease  -     Zinc; Future  -     Vitamin B12; Future  -     Vitamin B1, whole blood; Future  -     Vitamin A; Future  -     CBC and Platelet; Future  -     Comprehensive metabolic panel; Future  -     Iron Saturation %; Future    Pancreatic insufficiency  -     Zinc; Future  -     Vitamin B12; Future  -     Vitamin B1, whole blood; Future  -     Vitamin A; Future  -     CBC and Platelet; Future  -     Comprehensive metabolic panel; Future  -     Iron Saturation %; Future    Hyperparathyroidism ([address] )  -     Zinc; Future  -     Vitamin B12; Future  -     Vitamin B1, whole blood; Future  -     Vitamin A; Future  -     CBC and Platelet; Future  -     Comprehensive metabolic panel; Future  -     Iron Saturation %; Future    Hypothyroidism         Subjective:      Patient ID: Erin Dillon is a 58 y o  female  -s/p Alex-En-Y Gastric Bypass with Dr Gena Marie in 2011 Tolerating diet without issues; denies N/V, dysphagia, reflux  Initial: 215  Current: 166  EWL: (Weight loss is ahead of schedule at this post surgical period )  Giles: 150s  Current BMI is Body mass index is 31 48 kg/m²      · Tolerating a regular diet-yes, with exception of foods she cannot eat   · Eating at least 60 grams of protein per day-yes  · Following 30/60 minute rule with liquids-yes  · Drinking at least 64 ounces of fluid per day-yes  · Drinking carbonated beverages-no  · Sufficient exercise-yes  · Using NSAIDs regularly-no  · Using nicotine-no  · Using alcohol-occasional   · Supplements: lincoln mvi + mag oxide + calcium citrate + vit D3      The following portions of the patient's history were reviewed and updated as appropriate: allergies, current medications, past family history, past medical history, past social history, past surgical history and problem list     Review of Systems   Constitutional: Negative  Respiratory: Negative  Cardiovascular: Negative  Gastrointestinal: Negative  Musculoskeletal: Positive for arthralgias (pt reports low back/tailbone pain for the last several months - states it is consistent but improves with tylenol - i advised her to see her PCP regarding this pain as she may need imaging of her back  recent dexa sxan unremarkable overall)  Neurological: Negative  Psychiatric/Behavioral: Negative  Objective:    /70   Pulse 70   Resp 16   Ht 5' 1" (1 549 m)   Wt 75 6 kg (166 lb 9 6 oz)   BMI 31 48 kg/m²      Physical Exam  Vitals and nursing note reviewed  Constitutional:       Appearance: Normal appearance  She is obese  HENT:      Head: Normocephalic and atraumatic  Eyes:      Extraocular Movements: Extraocular movements intact  Pupils: Pupils are equal, round, and reactive to light  Cardiovascular:      Rate and Rhythm: Normal rate and regular rhythm  Pulmonary:      Effort: Pulmonary effort is normal       Breath sounds: Normal breath sounds  Abdominal:      General: Bowel sounds are normal       Tenderness: There is no abdominal tenderness  Musculoskeletal:         General: Normal range of motion  Cervical back: Normal range of motion  Skin:     General: Skin is warm and dry  Neurological:      General: No focal deficit present  Mental Status: She is alert and oriented to person, place, and time     Psychiatric:         Mood and Affect: Mood normal

## 2023-03-16 ENCOUNTER — OFFICE VISIT (OUTPATIENT)
Dept: BARIATRICS | Facility: CLINIC | Age: 63
End: 2023-03-16

## 2023-03-16 VITALS
HEIGHT: 61 IN | WEIGHT: 167.8 LBS | DIASTOLIC BLOOD PRESSURE: 80 MMHG | HEART RATE: 72 BPM | BODY MASS INDEX: 31.68 KG/M2 | SYSTOLIC BLOOD PRESSURE: 120 MMHG | OXYGEN SATURATION: 99 %

## 2023-03-16 DIAGNOSIS — E03.9 HYPOTHYROIDISM: ICD-10-CM

## 2023-03-16 DIAGNOSIS — K86.89 PANCREATIC INSUFFICIENCY: ICD-10-CM

## 2023-03-16 DIAGNOSIS — E66.9 OBESITY, CLASS I, BMI 30-34.9: Primary | ICD-10-CM

## 2023-03-16 DIAGNOSIS — E21.3 HYPERPARATHYROIDISM (HCC): ICD-10-CM

## 2023-03-16 PROBLEM — Z79.890 HORMONE REPLACEMENT THERAPY: Status: ACTIVE | Noted: 2023-03-16

## 2023-03-16 RX ORDER — MELATONIN
1000 DAILY
COMMUNITY

## 2023-03-16 RX ORDER — TOPIRAMATE 50 MG/1
50 TABLET, FILM COATED ORAL EVERY 12 HOURS SCHEDULED
Qty: 60 TABLET | Refills: 1 | Status: SHIPPED | OUTPATIENT
Start: 2023-03-16

## 2023-03-16 NOTE — ASSESSMENT & PLAN NOTE
Topiramate has been effective without any adverse reactions or side effects  Recommend increasing the dose  Patient is to contact the office right away if she develops any adverse reactions or side effects to the medication  Increase topiramate to 25mg in the morning and 50mg in the evening  After 2 weeks, you may further increase to 50mg twice a day  Continue exercising regularly  Have labs done as ordered  Follow-up with endocrinologist     Sydney Nobles with the office here in 2 months

## 2023-03-16 NOTE — PATIENT INSTRUCTIONS
Increase topiramate to 25mg in the morning and 50mg in the evening  After 2 weeks, you may further increase to 50mg twice a day  Continue exercising regularly  Have labs done as ordered  Follow-up with endocrinologist     Kerri Alford with the office here in 2 months

## 2023-03-16 NOTE — PROGRESS NOTES
Assessment/Plan:  Allegra Yen was seen today for follow-up  Diagnoses and all orders for this visit:    Obesity, Class I, BMI 30-34 9  -     topiramate (Topamax) 50 MG tablet; Take 1 tablet (50 mg total) by mouth every 12 (twelve) hours    Hypothyroidism    Hyperparathyroidism ([address] )    Pancreatic insufficiency       Obesity, Class I, BMI 30-34 9  Increase topiramate to 25mg in the morning and 50mg in the evening  After 2 weeks, you may further increase to 50mg twice a day  Continue exercising regularly  Have labs done as ordered  Follow-up with endocrinologist     Margarette Lynn with the office here in 2 months  Pancreatic insufficiency  Managed by her gastroenterologist     Hypothyroidism  Managed by her primary care doctor           ______________________________________________________________________        Subjective:   Chief Complaint   Patient presents with   • Follow-up     2 mo MWM follow-up     Patient here to discuss weight associated problems and nutrition goals  HPI: Kapil Hewitt  is a 61 y o  female with history of Alex-en-Y gastric bypass, pancreatic insufficiency, hypothyroidism, hyperparathyroidism and excess weight  Wt Readings from Last 10 Encounters:   03/16/23 76 1 kg (167 lb 12 8 oz)   02/21/23 75 6 kg (166 lb 9 6 oz)   01/20/23 77 2 kg (170 lb 3 2 oz)   12/01/22 77 kg (169 lb 12 8 oz)   11/15/22 74 8 kg (165 lb)   09/09/22 76 7 kg (169 lb)   08/30/22 76 7 kg (169 lb)   08/26/22 77 1 kg (169 lb 14 2 oz)   08/17/22 74 8 kg (165 lb)   08/10/22 74 8 kg (165 lb)     Initial weight: 230 prior to Alex-En-Y by Dr Ryan Olsen 2011  Giles: 150  Regained to 172  Phentermine stopped as it was not effective enough  Topamax started  Feels this is more effective  Alexys Guillen if the dose may be increased  Patient denies personal and family history of  pancreatitis, thyroid cancer, MEN-2 tumors  Denies any hx of glaucoma, seizures, kidney stones, gallstones    Denies Hx of CAD, PAD, palpitations, arrhythmia  Denies uncontrolled anxiety or depression, suicidal ideation or behavior, insomnia or sleep disturbance  Review Of Systems:  Review of Systems   Constitutional: Negative for activity change, appetite change, fatigue and fever  Respiratory: Negative for cough and shortness of breath  Cardiovascular: Negative for chest pain, palpitations and leg swelling  Gastrointestinal: Negative for abdominal pain, constipation, diarrhea, nausea and vomiting  Endocrine: Negative for cold intolerance and heat intolerance  Genitourinary: Negative for difficulty urinating and dysuria  Musculoskeletal: Negative for arthralgias, back pain and gait problem  Skin: Negative for pallor and rash  Neurological: Negative for headaches  Psychiatric/Behavioral: Negative for dysphoric mood, sleep disturbance and suicidal ideas (or HI)  The patient is not nervous/anxious  Objective:  /80 (BP Location: Left arm, Patient Position: Sitting, Cuff Size: Large)   Pulse 72   Ht 5' 1" (1 549 m)   Wt 76 1 kg (167 lb 12 8 oz)   SpO2 99%   BMI 31 71 kg/m²   Physical Exam  Vitals and nursing note reviewed  Constitutional:       General: She is not in acute distress  Appearance: Normal appearance  She is not ill-appearing or diaphoretic  Eyes:      General: No scleral icterus  Cardiovascular:      Rate and Rhythm: Normal rate and regular rhythm  Pulses: Normal pulses  Heart sounds: No murmur heard  Pulmonary:      Effort: Pulmonary effort is normal  No respiratory distress  Breath sounds: Normal breath sounds  No wheezing or rhonchi  Abdominal:      General: Bowel sounds are normal  There is no distension  Palpations: Abdomen is soft  There is no mass  Tenderness: There is no abdominal tenderness  Musculoskeletal:      Cervical back: Neck supple  Right lower leg: No edema  Left lower leg: No edema  Lymphadenopathy:      Cervical: No cervical adenopathy  Skin:     Capillary Refill: Capillary refill takes less than 2 seconds  Findings: No lesion or rash  Neurological:      Mental Status: She is alert and oriented to person, place, and time  Gait: Gait normal    Psychiatric:         Mood and Affect: Mood normal          Behavior: Behavior normal          Labs and Imaging  Recent labs and imaging have been personally reviewed    Lab Results   Component Value Date    WBC 5 47 03/21/2022    HGB 13 3 03/21/2022    HCT 39 4 03/21/2022    MCV 90 03/21/2022     03/21/2022     Lab Results   Component Value Date     02/16/2015    SODIUM 139 03/21/2022    K 4 0 03/21/2022     03/21/2022    CO2 25 03/21/2022    ANIONGAP 5 02/16/2015    AGAP 6 03/21/2022    BUN 13 03/21/2022    CREATININE 0 76 03/21/2022    GLUC 79 03/06/2016    GLUF 86 03/21/2022    CALCIUM 9 1 01/24/2023    AST 18 03/21/2022    ALT 25 03/21/2022    ALKPHOS 65 03/21/2022    PROT 7 6 02/16/2015    TP 7 1 03/21/2022    BILITOT 0 36 02/16/2015    TBILI 0 48 03/21/2022    EGFR 84 03/21/2022     No results found for: HGBA1C  Lab Results   Component Value Date    IEF8DTKCAATO 0 930 10/03/2021     Lab Results   Component Value Date    CHOLESTEROL 170 01/19/2021     Lab Results   Component Value Date    HDL 56 01/19/2021     Lab Results   Component Value Date    TRIG 167 (H) 01/19/2021     Lab Results   Component Value Date    LDLCALC 81 01/19/2021

## 2023-03-17 ENCOUNTER — HOSPITAL ENCOUNTER (OUTPATIENT)
Dept: RADIOLOGY | Facility: HOSPITAL | Age: 63
Discharge: HOME | End: 2023-03-17

## 2023-03-17 DIAGNOSIS — M54.50 LOW BACK PAIN, UNSPECIFIED BACK PAIN LATERALITY, UNSPECIFIED CHRONICITY, UNSPECIFIED WHETHER SCIATICA PRESENT: ICD-10-CM

## 2023-05-01 ENCOUNTER — TELEPHONE (OUTPATIENT)
Dept: ENDOCRINOLOGY | Facility: CLINIC | Age: 63
End: 2023-05-01

## 2023-05-01 NOTE — TELEPHONE ENCOUNTER
Not able to get this in a 20 mile radius with [facility] cannot take over the counter with severe pancreatic deficiency  Calcium Citrate 250 MG TABS [[id number]]     Order Details  Dose: 750 mg Route: Oral Frequency: 3 times daily   Dispense Quantity: 810 tablet Refills: 1          Sig: TAKE 3 TABLETS (750 MG TOTAL) BY MOUTH 3 (THREE) TIMES A DAY WITH LUNCH, DINNER, AND AT BEDTIME         Start Date: 02/15/23 End Date: --   Written Date: 02/15/23 Expiration Date: 02/15/24       Diagnosis Association: Elevated parathyroid hormone (E34 9);  Hyperparathyroidism ([facility] [address] ) (E21 3)   Original Order:  Calcium Citrate 250 MG TABS [[id number]]   Providers    Authorizing Provider:    Rachel Cantu DO   [address] [place] [address]   Phone:  [phone]   Fax:  [phone]   ROB #:  [id number]   NPI:  [id number]        Ordering User:  Rachel Cantu DO          Pharmacy    [facility] [address] [place] [address]   Phone:  [phone]  Fax:  [phone]   ROB #:  [id number]

## 2023-05-02 DIAGNOSIS — E34.9 ELEVATED PARATHYROID HORMONE: ICD-10-CM

## 2023-05-02 DIAGNOSIS — E21.3 HYPERPARATHYROIDISM (HCC): Primary | ICD-10-CM

## 2023-05-02 DIAGNOSIS — E03.9 HYPOTHYROIDISM, UNSPECIFIED TYPE: ICD-10-CM

## 2023-05-02 RX ORDER — PHENOL 1.4 %
AEROSOL, SPRAY (ML) MUCOUS MEMBRANE
Qty: 120 TABLET | Refills: 2 | Status: SHIPPED | OUTPATIENT
Start: 2023-05-02 | End: 2023-08-17

## 2023-05-02 NOTE — TELEPHONE ENCOUNTER
Will send in oscal prescription  Patient is due for labs as well so if we can remind her to do so that would be great  79 y/o F pt w/ PMHx of ?PE in the remote past, HTN, on Furosemide 20mg QD, not on any blood thinners, presents to ED c/o worsened cough, SOB and pleuritic chest pain x2-3 days. Per daughter at bedside, pt has not been feeling well for a while with fatigue and SOB. Pt underwent some testing with a cardiac catheterization done 2 months ago, was diagnosed with a LBBB, Aortic valve regurgitation and dilated cardiomyopathy. Pt also endorses L calf pain. Pt denies fever, chills, recent abx use, or any other complaints. Allergic to Penicillin. 77 y/o F pt w/ PMHx of ?PE in the remote past, HTN, on Furosemide 20mg QD, not on any blood thinners, presents to ED c/o cough, SOB and pleuritic chest pain x2-3 days. Per daughter at bedside, pt has not been feeling well for a while with fatigue and SOB. Pt underwent some testing, had an MRI and cardiac catheterization done 2 months ago and was diagnosed with an Aortic valve regurgitation and dilated cardiomyopathy. Pt also endorses L calf pain. Pt denies fever, chills, recent abx use, or any other complaints. Pt is allergic to Penicillin. 77 y/o F pt w/ PMHx of ?PE in the remote past, HTN, on Furosemide 20mg QD, not on any blood thinners, presents to ED c/o cough, SOB and pleuritic chest pain x2-3 days. Per daughter at bedside, pt has not been feeling well for a while with fatigue and SOB. Pt underwent some testing, had an MRI, EKG, and cardiac catheterization done 2 months ago and was diagnosed with a LBBB, Aortic valve regurgitation and dilated cardiomyopathy. Pt also endorses L calf pain. Pt denies fever, chills, recent abx use, or any other complaints. Pt is allergic to Penicillin.

## 2023-05-14 ENCOUNTER — APPOINTMENT (OUTPATIENT)
Dept: LAB | Age: 63
End: 2023-05-14

## 2023-05-14 DIAGNOSIS — Z98.84 BARIATRIC SURGERY STATUS: ICD-10-CM

## 2023-05-14 DIAGNOSIS — E03.9 MYXEDEMA HEART DISEASE: ICD-10-CM

## 2023-05-14 DIAGNOSIS — E21.3 HYPERPARATHYROIDISM (HCC): ICD-10-CM

## 2023-05-14 DIAGNOSIS — K86.89 PANCREATIC INSUFFICIENCY: ICD-10-CM

## 2023-05-14 DIAGNOSIS — I51.9 MYXEDEMA HEART DISEASE: ICD-10-CM

## 2023-05-14 DIAGNOSIS — Z48.815 ENCOUNTER FOR SURGICAL AFTERCARE FOLLOWING SURGERY OF DIGESTIVE SYSTEM: ICD-10-CM

## 2023-05-14 DIAGNOSIS — K21.9 GASTROESOPHAGEAL REFLUX DISEASE: ICD-10-CM

## 2023-05-14 DIAGNOSIS — E66.9 OBESITY, CLASS I, BMI 30-34.9: ICD-10-CM

## 2023-05-14 DIAGNOSIS — K91.2 POSTSURGICAL MALABSORPTION: ICD-10-CM

## 2023-05-14 DIAGNOSIS — E03.9 HYPOTHYROIDISM, UNSPECIFIED TYPE: ICD-10-CM

## 2023-05-14 DIAGNOSIS — E34.9 ELEVATED PARATHYROID HORMONE: ICD-10-CM

## 2023-05-14 LAB
ERYTHROCYTE [DISTWIDTH] IN BLOOD BY AUTOMATED COUNT: 12.8 % (ref 11.6–15.1)
HCT VFR BLD AUTO: 41.5 % (ref 34.8–46.1)
HGB BLD-MCNC: 13.2 G/DL (ref 11.5–15.4)
MCH RBC QN AUTO: 30.3 PG (ref 26.8–34.3)
MCHC RBC AUTO-ENTMCNC: 31.8 G/DL (ref 31.4–37.4)
MCV RBC AUTO: 95 FL (ref 82–98)
PLATELET # BLD AUTO: 269 THOUSANDS/UL (ref 149–390)
PMV BLD AUTO: 11 FL (ref 8.9–12.7)
RBC # BLD AUTO: 4.36 MILLION/UL (ref 3.81–5.12)
WBC # BLD AUTO: 5.24 THOUSAND/UL (ref 4.31–10.16)

## 2023-05-14 PROCEDURE — 84100 ASSAY OF PHOSPHORUS: CPT | Performed by: INTERNAL MEDICINE

## 2023-05-14 PROCEDURE — 82306 VITAMIN D 25 HYDROXY: CPT | Performed by: INTERNAL MEDICINE

## 2023-05-14 PROCEDURE — 36415 COLL VENOUS BLD VENIPUNCTURE: CPT | Performed by: INTERNAL MEDICINE

## 2023-05-14 PROCEDURE — 83735 ASSAY OF MAGNESIUM: CPT | Performed by: INTERNAL MEDICINE

## 2023-05-14 PROCEDURE — 84439 ASSAY OF FREE THYROXINE: CPT | Performed by: INTERNAL MEDICINE

## 2023-05-14 PROCEDURE — 84443 ASSAY THYROID STIM HORMONE: CPT | Performed by: INTERNAL MEDICINE

## 2023-05-14 PROCEDURE — 83970 ASSAY OF PARATHORMONE: CPT | Performed by: INTERNAL MEDICINE

## 2023-05-14 PROCEDURE — 80053 COMPREHEN METABOLIC PANEL: CPT | Performed by: INTERNAL MEDICINE

## 2023-05-15 DIAGNOSIS — E03.9 HYPOTHYROIDISM, UNSPECIFIED TYPE: ICD-10-CM

## 2023-05-15 DIAGNOSIS — E21.3 HYPERPARATHYROIDISM (HCC): Primary | ICD-10-CM

## 2023-05-15 LAB
25(OH)D3 SERPL-MCNC: 45.7 NG/ML (ref 30–100)
ALBUMIN SERPL BCP-MCNC: 3.7 G/DL (ref 3.5–5)
ALP SERPL-CCNC: 58 U/L (ref 46–116)
ALT SERPL W P-5'-P-CCNC: 27 U/L (ref 12–78)
ANION GAP SERPL CALCULATED.3IONS-SCNC: 1 MMOL/L (ref 4–13)
AST SERPL W P-5'-P-CCNC: 17 U/L (ref 5–45)
BILIRUB SERPL-MCNC: 0.52 MG/DL (ref 0.2–1)
BUN SERPL-MCNC: 21 MG/DL (ref 5–25)
CALCIUM SERPL-MCNC: 9 MG/DL (ref 8.3–10.1)
CHLORIDE SERPL-SCNC: 116 MMOL/L (ref 96–108)
CO2 SERPL-SCNC: 22 MMOL/L (ref 21–32)
CREAT SERPL-MCNC: 0.79 MG/DL (ref 0.6–1.3)
GFR SERPL CREATININE-BSD FRML MDRD: 79 ML/MIN/1.73SQ M
GLUCOSE P FAST SERPL-MCNC: 88 MG/DL (ref 65–99)
IRON SATN MFR SERPL: 28 % (ref 15–50)
IRON SERPL-MCNC: 114 UG/DL (ref 50–170)
MAGNESIUM SERPL-MCNC: 2.4 MG/DL (ref 1.6–2.6)
PHOSPHATE SERPL-MCNC: 2.9 MG/DL (ref 2.3–4.1)
POTASSIUM SERPL-SCNC: 4 MMOL/L (ref 3.5–5.3)
PROT SERPL-MCNC: 7.6 G/DL (ref 6.4–8.4)
PTH-INTACT SERPL-MCNC: 77.7 PG/ML (ref 18.4–80.1)
SODIUM SERPL-SCNC: 139 MMOL/L (ref 135–147)
T4 FREE SERPL-MCNC: 0.82 NG/DL (ref 0.76–1.46)
TIBC SERPL-MCNC: 414 UG/DL (ref 250–450)
TSH SERPL DL<=0.05 MIU/L-ACNC: 2.42 UIU/ML (ref 0.45–4.5)
VIT B12 SERPL-MCNC: 1512 PG/ML (ref 100–900)

## 2023-05-17 LAB — ZINC SERPL-MCNC: 90 UG/DL (ref 44–115)

## 2023-05-18 LAB — VIT B1 BLD-SCNC: 174.2 NMOL/L (ref 66.5–200)

## 2023-05-19 ENCOUNTER — OFFICE VISIT (OUTPATIENT)
Dept: BARIATRICS | Facility: CLINIC | Age: 63
End: 2023-05-19

## 2023-05-19 VITALS
HEIGHT: 61 IN | HEART RATE: 64 BPM | WEIGHT: 166 LBS | SYSTOLIC BLOOD PRESSURE: 120 MMHG | BODY MASS INDEX: 31.34 KG/M2 | RESPIRATION RATE: 16 BRPM | DIASTOLIC BLOOD PRESSURE: 70 MMHG

## 2023-05-19 DIAGNOSIS — E03.9 HYPOTHYROIDISM: ICD-10-CM

## 2023-05-19 DIAGNOSIS — E66.9 OBESITY, CLASS I, BMI 30-34.9: ICD-10-CM

## 2023-05-19 DIAGNOSIS — R63.8 ABNORMAL CRAVING: Primary | ICD-10-CM

## 2023-05-19 LAB — VIT A SERPL-MCNC: 61.4 UG/DL (ref 22–69.5)

## 2023-05-19 RX ORDER — BUPROPION HYDROCHLORIDE 150 MG/1
150 TABLET, EXTENDED RELEASE ORAL 2 TIMES DAILY
Qty: 60 TABLET | Refills: 1 | Status: SHIPPED | OUTPATIENT
Start: 2023-05-19 | End: 2023-06-18

## 2023-05-19 NOTE — ASSESSMENT & PLAN NOTE
-Patient is pursuing conservative program  -Initial weight loss goal of 5-10% weight loss for improved health  -Screening labs 3/21/22    Initial:172 9  Current:166  Change:-6 9lb (change from last OV -1 8lb )    Goal:  -discussed food tracking-1100 calorie goal and 60gm protein min  -Continue to go to the gym 4 times a week  -Continue to drink at least 60 oz water daily  Discussed contrave  Does not have coverage for it though so will initially  start wellbutrin 150 SR BID and then once seen for follow up to start naltrexon to off label use as a mimic of contrave  Reviewed the potential side effects of wellbutrin which include: abdominal upset,  Depression/anxiety  Patient should call/return if he/she develops symptoms of depression/aniety  To stop topamax-has not been helping with appetite control  Wean off 1/2 tablet BID x 1 week  Prior was on phentermine but did not lose sufficient weight

## 2023-05-19 NOTE — PATIENT INSTRUCTIONS
Take 1/2 tablet of Topamax morning and night for a week then stop    Recommend 60gm of protein daily, 1100 calorie goal

## 2023-05-19 NOTE — PROGRESS NOTES
Assessment/Plan:    Obesity, Class I, BMI 30-34 9  -Patient is pursuing conservative program  -Initial weight loss goal of 5-10% weight loss for improved health  -Screening labs 3/21/22    Initial:172 9  Current:166  Change:-6 9lb (change from last OV -1 8lb )    Goal:  -discussed food tracking-1100 calorie goal and 60gm protein min  -Continue to go to the gym 4 times a week  -Continue to drink at least 60 oz water daily  Discussed contrave  Does not have coverage for it though so will initially  start wellbutrin 150 SR BID and then once seen for follow up to start naltrexon to off label use as a mimic of contrave  Reviewed the potential side effects of wellbutrin which include: abdominal upset,  Depression/anxiety  Patient should call/return if he/she develops symptoms of depression/aniety  To stop topamax-has not been helping with appetite control  Wean off 1/2 tablet BID x 1 week  Prior was on phentermine but did not lose sufficient weight  Hypothyroidism  On levothyroxine  TSH wnl        Follow up in approximately 3 months with Non-Surgical Physician/Advanced Practitioner and nurse visit in 1 month  Diagnoses and all orders for this visit:    Abnormal craving  -     buPROPion (Wellbutrin SR) 150 mg 12 hr tablet; Take 1 tablet (150 mg total) by mouth 2 (two) times a day    Obesity, Class I, BMI 30-34 9    Hypothyroidism    Other orders  -     Calcium Carbonate 1500 (600 Ca) MG TABS; 2 TABLETS TWICE A DAY          Subjective:   Chief Complaint   Patient presents with   • Follow-up     MWM 2mth f/u; Waist-36in        Patient ID: Anitra Muro  is a 61 y o  female with excess weight/obesity here to pursue weight managment  Patient is pursuing Conservative Program      HPI   61 y o  female with history of Alex-en-Y gastric bypass, pancreatic insufficiency, hypothyroidism, hyperparathyroidism and excess weight  Currently on topamax 50mg BID  No change in weight since last seen     She is taking a tablet at 12 PM and then 9 PM  She is feeling more hungry still and not noticing any change in appetite with the medication  She has been having more GI problems over the last 2 months and did a reset  She stoppped her medications and then restarted them and now is doing better  She did see endocrinology for elevated PTH  She had change in calcium and it has improved  Wt Readings from Last 10 Encounters:   23 75 3 kg (166 lb)   23 76 1 kg (167 lb 12 8 oz)   23 75 6 kg (166 lb 9 6 oz)   23 77 2 kg (170 lb 3 2 oz)   22 77 kg (169 lb 12 8 oz)   11/15/22 74 8 kg (165 lb)   22 76 7 kg (169 lb)   22 76 7 kg (169 lb)   22 77 1 kg (169 lb 14 2 oz)   22 74 8 kg (165 lb)       Food logging:no  Increased appetite/cravings: in the afternoon, evening  Fruit/Vegetable servings:  Exercise: more frequent-combo cardio/weights  Hydration:water at least 60 oz day    Diet Recall:  B:bagel (547 bethany), 1 slice cheese  L: sandwich  D: protein, vegetabl, carb  S:can be pretzel, cheese, popcorn, biscotti  Colonoscopy-Completed    The following portions of the patient's history were reviewed and updated as appropriate:   She  has a past medical history of Cervical cancer (HCC) and GERD (gastroesophageal reflux disease)  She   Patient Active Problem List    Diagnosis Date Noted   • Pancreatic insufficiency 2023   • Hormone replacement therapy 2023   • Obesity, Class I, BMI 30-34 9 2022   • Gastroesophageal reflux disease 2022   • H/O bariatric surgery 2022   • Hypothyroidism 2022     She  has a past surgical history that includes Hysterectomy; Oophorectomy (Bilateral); Breast biopsy (Right);  section (1986); Urethral sling (2010); Gastric bypass (2011); Nose surgery; Other surgical history (2019); Colonoscopy (2019); and EGD (2019)    Her family history includes Breast cancer in her maternal grandmother; No Known Problems in her daughter, father, maternal aunt, maternal aunt, maternal aunt, maternal aunt, maternal aunt, maternal aunt, maternal aunt, maternal aunt, maternal aunt, maternal grandfather, mother, paternal aunt, paternal aunt, paternal aunt, paternal aunt, paternal aunt, paternal aunt, paternal grandfather, paternal grandmother, and sister; Thyroid disease in her family  She  reports that she has never smoked  She has never used smokeless tobacco  She reports that she does not currently use alcohol  She reports that she does not use drugs  Current Outpatient Medications   Medication Sig Dispense Refill   • buPROPion (Wellbutrin SR) 150 mg 12 hr tablet Take 1 tablet (150 mg total) by mouth 2 (two) times a day 60 tablet 1   • calcium carbonate (OS-IRIS) 600 MG tablet 2 tabs bid 120 tablet 2   • Calcium Carbonate 1500 (600 Ca) MG TABS 2 TABLETS TWICE A DAY     • cholecalciferol (VITAMIN D3) 1,000 units tablet Take 1,000 Units by mouth daily     • Estradiol 6 MG PLLT 6 mg every 4 (four) months     • levocetirizine (XYZAL) 5 MG tablet Take 5 mg by mouth every evening     • levothyroxine 112 mcg tablet Take 112 mcg by mouth daily     • loratadine (CLARITIN) 10 mg tablet Take 10 mg by mouth daily     • magnesium oxide (MAG-OX) 400 mg tablet Take 400 mg by mouth daily at bedtime     • Multiple Vitamins-Minerals (BARIATRIC FUSION PO) Take 1 tablet by mouth daily     • pantoprazole (PROTONIX) 40 mg tablet Take 40 mg by mouth daily     • Testosterone 50 MG PLLT 50 mg every 4 (four) months     • topiramate (Topamax) 50 MG tablet Take 1 tablet (50 mg total) by mouth every 12 (twelve) hours 60 tablet 1   • Zenpep [id number] units CPEP Take 5 capsules by mouth daily       No current facility-administered medications for this visit       Current Outpatient Medications on File Prior to Visit   Medication Sig   • calcium carbonate (OS-IRIS) 600 MG tablet 2 tabs bid   • Calcium Carbonate 1500 (600 Ca) MG TABS "2 TABLETS TWICE A DAY   • cholecalciferol (VITAMIN D3) 1,000 units tablet Take 1,000 Units by mouth daily   • Estradiol 6 MG PLLT 6 mg every 4 (four) months   • levocetirizine (XYZAL) 5 MG tablet Take 5 mg by mouth every evening   • levothyroxine 112 mcg tablet Take 112 mcg by mouth daily   • loratadine (CLARITIN) 10 mg tablet Take 10 mg by mouth daily   • magnesium oxide (MAG-OX) 400 mg tablet Take 400 mg by mouth daily at bedtime   • Multiple Vitamins-Minerals (BARIATRIC FUSION PO) Take 1 tablet by mouth daily   • pantoprazole (PROTONIX) 40 mg tablet Take 40 mg by mouth daily   • Testosterone 50 MG PLLT 50 mg every 4 (four) months   • topiramate (Topamax) 50 MG tablet Take 1 tablet (50 mg total) by mouth every 12 (twelve) hours   • Zenpep [id number] units CPEP Take 5 capsules by mouth daily     No current facility-administered medications on file prior to visit  She is allergic to latex       Review of Systems   Constitutional: Negative for fever  Respiratory: Negative for shortness of breath  Cardiovascular: Negative for chest pain and palpitations  Gastrointestinal: Negative for abdominal pain, constipation, diarrhea and vomiting  Genitourinary: Negative for difficulty urinating  Musculoskeletal: Positive for arthralgias and back pain (improved with activity)  Skin: Negative for rash  Neurological: Negative for headaches  Psychiatric/Behavioral: Negative for dysphoric mood  The patient is not nervous/anxious  Objective:    /70   Pulse 64   Resp 16   Ht 5' 1\" (1 549 m)   Wt 75 3 kg (166 lb)   BMI 31 37 kg/m²      Physical Exam  Vitals and nursing note reviewed  Constitutional:       General: She is not in acute distress  Appearance: She is well-developed  She is obese  HENT:      Head: Normocephalic and atraumatic  Eyes:      Conjunctiva/sclera: Conjunctivae normal    Neck:      Thyroid: No thyromegaly     Pulmonary:      Effort: Pulmonary effort is normal  No " respiratory distress  Skin:     Findings: No rash (visible)  Neurological:      Mental Status: She is alert and oriented to person, place, and time     Psychiatric:         Mood and Affect: Mood normal          Behavior: Behavior normal

## 2023-06-14 ENCOUNTER — TELEPHONE (OUTPATIENT)
Dept: BARIATRICS | Facility: CLINIC | Age: 63
End: 2023-06-14

## 2023-06-16 ENCOUNTER — CLINICAL SUPPORT (OUTPATIENT)
Dept: BARIATRICS | Facility: CLINIC | Age: 63
End: 2023-06-16

## 2023-06-16 VITALS
BODY MASS INDEX: 31.38 KG/M2 | DIASTOLIC BLOOD PRESSURE: 72 MMHG | RESPIRATION RATE: 16 BRPM | SYSTOLIC BLOOD PRESSURE: 116 MMHG | WEIGHT: 166.2 LBS | HEIGHT: 61 IN | HEART RATE: 80 BPM

## 2023-06-16 DIAGNOSIS — R63.8 ABNORMAL CRAVING: Primary | ICD-10-CM

## 2023-06-16 DIAGNOSIS — E66.9 OBESITY, CLASS I, BMI 30-34.9: ICD-10-CM

## 2023-06-16 DIAGNOSIS — R63.5 ABNORMAL WEIGHT GAIN: Primary | ICD-10-CM

## 2023-06-16 PROCEDURE — RECHECK

## 2023-06-16 RX ORDER — NALTREXONE HYDROCHLORIDE 50 MG/1
TABLET, FILM COATED ORAL
Qty: 30 TABLET | Refills: 1 | Status: SHIPPED | OUTPATIENT
Start: 2023-06-16 | End: 2023-08-25

## 2023-06-16 NOTE — PROGRESS NOTES
- Is BP less than 100/60? NO  - Is BP greater than 140/90?n NO  - Is HR greater than 100? No  **If yes to any of the above, have patient relax and repeat in 5-10 minutes**    Repeat values:    - Is BP less than 100/60?  - Is BP greater than 140/90?  - Is HR greater than 100?   **If values remain outside of ranges above, please consult provider for next steps**

## 2023-07-14 ENCOUNTER — APPOINTMENT (OUTPATIENT)
Dept: LAB | Age: 63
End: 2023-07-14
Payer: COMMERCIAL

## 2023-07-14 DIAGNOSIS — E21.3 HYPERPARATHYROIDISM (HCC): ICD-10-CM

## 2023-07-14 DIAGNOSIS — E89.41 SYMPTOMATIC STATES ASSOCIATED WITH ARTIFICIAL MENOPAUSE: ICD-10-CM

## 2023-07-14 DIAGNOSIS — E03.9 HYPOTHYROIDISM, UNSPECIFIED TYPE: ICD-10-CM

## 2023-07-14 LAB
25(OH)D3 SERPL-MCNC: 45.7 NG/ML (ref 30–100)
ALBUMIN SERPL BCP-MCNC: 3.5 G/DL (ref 3.5–5)
ALP SERPL-CCNC: 64 U/L (ref 46–116)
ALT SERPL W P-5'-P-CCNC: 27 U/L (ref 12–78)
ANION GAP SERPL CALCULATED.3IONS-SCNC: 3 MMOL/L
AST SERPL W P-5'-P-CCNC: 20 U/L (ref 5–45)
BILIRUB SERPL-MCNC: 0.41 MG/DL (ref 0.2–1)
BUN SERPL-MCNC: 18 MG/DL (ref 5–25)
CALCIUM SERPL-MCNC: 8.9 MG/DL (ref 8.3–10.1)
CHLORIDE SERPL-SCNC: 112 MMOL/L (ref 96–108)
CO2 SERPL-SCNC: 25 MMOL/L (ref 21–32)
CREAT SERPL-MCNC: 0.82 MG/DL (ref 0.6–1.3)
GFR SERPL CREATININE-BSD FRML MDRD: 76 ML/MIN/1.73SQ M
GLUCOSE P FAST SERPL-MCNC: 93 MG/DL (ref 65–99)
POTASSIUM SERPL-SCNC: 4.2 MMOL/L (ref 3.5–5.3)
PROT SERPL-MCNC: 7.1 G/DL (ref 6.4–8.4)
PTH-INTACT SERPL-MCNC: 88.9 PG/ML (ref 12–88)
SODIUM SERPL-SCNC: 140 MMOL/L (ref 135–147)
T4 FREE SERPL-MCNC: 0.91 NG/DL (ref 0.61–1.12)
TSH SERPL DL<=0.05 MIU/L-ACNC: 3.06 UIU/ML (ref 0.45–4.5)

## 2023-07-14 PROCEDURE — 83970 ASSAY OF PARATHORMONE: CPT

## 2023-07-14 PROCEDURE — 82306 VITAMIN D 25 HYDROXY: CPT

## 2023-07-14 PROCEDURE — 36415 COLL VENOUS BLD VENIPUNCTURE: CPT

## 2023-07-14 PROCEDURE — 82670 ASSAY OF TOTAL ESTRADIOL: CPT

## 2023-07-14 PROCEDURE — 84402 ASSAY OF FREE TESTOSTERONE: CPT

## 2023-07-14 PROCEDURE — 84403 ASSAY OF TOTAL TESTOSTERONE: CPT

## 2023-07-14 PROCEDURE — 80053 COMPREHEN METABOLIC PANEL: CPT

## 2023-07-14 PROCEDURE — 84439 ASSAY OF FREE THYROXINE: CPT

## 2023-07-14 PROCEDURE — 84443 ASSAY THYROID STIM HORMONE: CPT

## 2023-07-15 LAB — ESTRADIOL SERPL-MCNC: 31.9 PG/ML

## 2023-07-16 LAB
TESTOST FREE SERPL-MCNC: 0.2 PG/ML (ref 0–4.2)
TESTOST SERPL-MCNC: 19 NG/DL (ref 3–67)

## 2023-07-21 ENCOUNTER — OFFICE VISIT (OUTPATIENT)
Dept: ENDOCRINOLOGY | Facility: CLINIC | Age: 63
End: 2023-07-21
Payer: COMMERCIAL

## 2023-07-21 VITALS
HEIGHT: 61 IN | HEART RATE: 71 BPM | BODY MASS INDEX: 31.23 KG/M2 | OXYGEN SATURATION: 96 % | WEIGHT: 165.4 LBS | DIASTOLIC BLOOD PRESSURE: 82 MMHG | SYSTOLIC BLOOD PRESSURE: 144 MMHG

## 2023-07-21 DIAGNOSIS — E03.9 HYPOTHYROIDISM, UNSPECIFIED TYPE: ICD-10-CM

## 2023-07-21 DIAGNOSIS — Z98.84 H/O BARIATRIC SURGERY: ICD-10-CM

## 2023-07-21 DIAGNOSIS — E21.3 HYPERPARATHYROIDISM (HCC): Primary | ICD-10-CM

## 2023-07-21 PROCEDURE — 99214 OFFICE O/P EST MOD 30 MIN: CPT | Performed by: INTERNAL MEDICINE

## 2023-07-21 NOTE — PATIENT INSTRUCTIONS
Adjust os-bethany (calcium carbonate) to 2 tablets in the morning and 2 tablets in the evening. Continue the higher dose of vitamin D of 2000 IU daily. Repeat labs in about 6-8 weeks.

## 2023-07-21 NOTE — PROGRESS NOTES
7/21/2023    Assessment/Plan      Diagnoses and all orders for this visit:    Hyperparathyroidism ([address])  -     Comprehensive metabolic panel; Future  -     PTH, intact; Future  -     Vitamin D 25 hydroxy; Future  -     TSH, 3rd generation; Future  -     T4, free; Future  -     Phosphorus Lab Collect; Future    Hypothyroidism, unspecified type  -     Comprehensive metabolic panel; Future  -     PTH, intact; Future  -     Vitamin D 25 hydroxy; Future  -     TSH, 3rd generation; Future  -     T4, free; Future  -     Phosphorus Lab Collect; Future    H/O bariatric surgery  -     Comprehensive metabolic panel; Future  -     PTH, intact; Future  -     Vitamin D 25 hydroxy; Future  -     TSH, 3rd generation; Future  -     T4, free; Future  -     Phosphorus Lab Collect; Future        Assessment/Plan:  Hyperparathyroidism: Suspect this is secondary in nature. We will adjust her supplementation to calcium carbonate 600 mg tablets and have her take 2 tablets twice daily instead of 1 tablet 4 times daily. Vitamin D3 was recently increased. Therefore we will plan to repeat lab work at the beginning of September. Previously parathyroid hormone level did improve with calcium supplementation and calcium remains normal.      CC: Follow-up    History of Present Illness     HPI: Radha Jacobs is a 61y.o. year old female who presents for a follow-up of hyperparathyroidism. This was discovered in lab work. She does have history of Alex-en-Y gastric bypass surgery in 2011. She currently takes calcium and vitamin D supplementation. She also has a history of hypothyroidism treated with levothyroxine 112 mcg daily. She continues on calcium carbonate 600 mg tabs and takes 2 tablets twice daily as well as vitamin D3 2000 units daily. She has been taking her calcium 1 tablet 4 times daily instead of 2 tablets twice daily.   She recently did increase her vitamin D on her own from 1000 units daily to 2000 units daily she presents today for a follow-up visit. She was having some increased fatigue but the past few days have been better. Review of Systems   Constitutional: Positive for fatigue. HENT: Negative for trouble swallowing and voice change. Eyes: Negative for visual disturbance. Respiratory: Negative for shortness of breath. Cardiovascular: Negative for palpitations and leg swelling. Gastrointestinal: Negative for abdominal pain, nausea and vomiting. Endocrine: Negative for polydipsia and polyuria. Musculoskeletal: Negative for arthralgias and myalgias. Skin: Negative for rash. Neurological: Negative for dizziness, tremors and weakness. Hematological: Negative for adenopathy. Psychiatric/Behavioral: Negative for agitation and confusion.        Historical Information   Past Medical History:   Diagnosis Date   • Cervical cancer ([address])    • GERD (gastroesophageal reflux disease)      Past Surgical History:   Procedure Laterality Date   • BREAST BIOPSY Right    •  SECTION  1986   • COLONOSCOPY  2019    2nd procedure   • EGD  2019   • GASTRIC BYPASS  2011   • HYSTERECTOMY      Age 50   • NOSE SURGERY      removal of a growth in the right nostril   • OOPHORECTOMY Bilateral     Age 50   • OTHER SURGICAL HISTORY  2019    Cervical Intraepithelial Neioplasia Grage 2 Removal   • URETHRAL SLING  2010     Social History   Social History     Substance and Sexual Activity   Alcohol Use Not Currently   • Alcohol/week: 1.0 standard drink of alcohol   • Types: 1 Shots of liquor per week    Comment: occ     Social History     Substance and Sexual Activity   Drug Use Never     Social History     Tobacco Use   Smoking Status Never   • Passive exposure: Never   Smokeless Tobacco Never     Family History:   Family History   Problem Relation Age of Onset   • Breast cancer Maternal Grandmother         unknown age   • No Known Problems Mother    • No Known Problems Father    • No Known Problems Sister    • No Known Problems Daughter    • No Known Problems Maternal Grandfather    • No Known Problems Paternal Grandmother    • No Known Problems Paternal Grandfather    • No Known Problems Maternal Aunt    • No Known Problems Maternal Aunt    • No Known Problems Maternal Aunt    • No Known Problems Maternal Aunt    • No Known Problems Maternal Aunt    • No Known Problems Maternal Aunt    • No Known Problems Maternal Aunt    • No Known Problems Maternal Aunt    • No Known Problems Maternal Aunt    • No Known Problems Paternal Aunt    • No Known Problems Paternal Aunt    • No Known Problems Paternal Aunt    • No Known Problems Paternal Aunt    • No Known Problems Paternal Aunt    • No Known Problems Paternal Aunt    • Thyroid disease Family        Meds/Allergies   Current Outpatient Medications   Medication Sig Dispense Refill   • buPROPion (WELLBUTRIN SR) 150 mg 12 hr tablet TAKE 1 TABLET BY MOUTH TWICE A  tablet 1   • calcium carbonate (OS-IRIS) 600 MG tablet 2 tabs bid 120 tablet 2   • cholecalciferol (VITAMIN D3) 1,000 units tablet Take 2 tablets by mouth daily     • Estradiol 6 MG PLLT 6 mg every 4 (four) months     • levocetirizine (XYZAL) 5 MG tablet Take 5 mg by mouth every evening     • levothyroxine 112 mcg tablet Take 112 mcg by mouth daily     • loratadine (CLARITIN) 10 mg tablet Take 10 mg by mouth daily     • magnesium oxide (MAG-OX) 400 mg tablet Take 400 mg by mouth daily at bedtime     • Multiple Vitamins-Minerals (BARIATRIC FUSION PO) Take 1 tablet by mouth daily     • naltrexone (REVIA) 50 mg tablet Take 1/2 a tablet a day for 2 weeks then take 1 tablet daily 30 tablet 1   • pantoprazole (PROTONIX) 40 mg tablet Take 40 mg by mouth daily     • Testosterone 50 MG PLLT 50 mg every 4 (four) months     • Zenpep [id number] units CPEP Take 5 capsules by mouth daily Takes 5-7 caps/day     • Calcium Carbonate 1500 (600 Ca) MG TABS 2 TABLETS TWICE A DAY (Patient not taking: Reported on 7/21/2023) • topiramate (Topamax) 50 MG tablet Take 1 tablet (50 mg total) by mouth every 12 (twelve) hours (Patient not taking: Reported on 7/21/2023) 60 tablet 1     No current facility-administered medications for this visit. Allergies   Allergen Reactions   • Latex Rash       Objective   Vitals: Blood pressure 144/82, pulse 71, height 5' 1" (1.549 m), weight 75 kg (165 lb 6.4 oz), SpO2 96 %, not currently breastfeeding. Invasive Devices     None                 Physical Exam  Vitals reviewed. Constitutional:       General: She is not in acute distress. Appearance: She is well-developed. She is not diaphoretic. HENT:      Head: Normocephalic and atraumatic. Eyes:      General: No scleral icterus. Conjunctiva/sclera: Conjunctivae normal.      Pupils: Pupils are equal, round, and reactive to light. Neck:      Thyroid: No thyromegaly. Cardiovascular:      Rate and Rhythm: Normal rate and regular rhythm. Pulmonary:      Effort: Pulmonary effort is normal. No respiratory distress. Breath sounds: Normal breath sounds. Abdominal:      General: Bowel sounds are normal.      Palpations: Abdomen is soft. Musculoskeletal:         General: Normal range of motion. Cervical back: Normal range of motion and neck supple. Skin:     General: Skin is warm and dry. Findings: No rash. Neurological:      Mental Status: She is alert and oriented to person, place, and time. Motor: No abnormal muscle tone. Psychiatric:         Behavior: Behavior normal.         The history was obtained from the review of the chart and from the patient.     Lab Results:      Component      Latest Ref Rng 7/14/2023   Sodium      135 - 147 mmol/L 140    Potassium      3.5 - 5.3 mmol/L 4.2    Chloride      96 - 108 mmol/L 112 (H)    CO2      21 - 32 mmol/L 25    Anion Gap      mmol/L 3    BUN      5 - 25 mg/dL 18    Creatinine      0.60 - 1.30 mg/dL 0.82    GLUCOSE FASTING      65 - 99 mg/dL 93    Calcium 8.3 - 10.1 mg/dL 8.9    AST      5 - 45 U/L 20    ALT      12 - 78 U/L 27    Alkaline Phosphatase      46 - 116 U/L 64    Total Protein      6.4 - 8.4 g/dL 7.1    Albumin      3.5 - 5.0 g/dL 3.5    TOTAL BILIRUBIN      0.20 - 1.00 mg/dL 0.41    eGFR      ml/min/1.73sq m 76    PARATHYROID HORMONE      12.0 - 88.0 pg/mL 88.9 (H)    Vit D, 25-Hydroxy      30.0 - 100.0 ng/mL 45.7    TSH 3RD GENERATON      0.450 - 4.500 uIU/mL 3.061    Free T4      0.61 - 1.12 ng/dL 0.91       Legend:  (H) High      Future Appointments   Date Time Provider [address]   9/8/2023 10:30 AM Jelly James PA-C [facility]   2/21/2024 10:00 AM Carolynn Paulson PA-C [facility]       Portions of the record may have been created with voice recognition software. Occasional wrong word or "sound a like" substitutions may have occurred due to the inherent limitations of voice recognition software. Read the chart carefully and recognize, using context, where substitutions have occurred.

## 2023-08-16 DIAGNOSIS — E21.3 HYPERPARATHYROIDISM (HCC): ICD-10-CM

## 2023-08-24 ENCOUNTER — HOSPITAL ENCOUNTER (OUTPATIENT)
Dept: RADIOLOGY | Age: 63
Discharge: HOME | End: 2023-08-24
Payer: COMMERCIAL

## 2023-08-24 VITALS — BODY MASS INDEX: 30.21 KG/M2 | WEIGHT: 160 LBS | HEIGHT: 61 IN

## 2023-08-24 DIAGNOSIS — Z12.31 VISIT FOR SCREENING MAMMOGRAM: ICD-10-CM

## 2023-08-24 PROCEDURE — 77063 BREAST TOMOSYNTHESIS BI: CPT

## 2023-08-24 PROCEDURE — 77067 SCR MAMMO BI INCL CAD: CPT

## 2023-08-25 DIAGNOSIS — R63.8 ABNORMAL CRAVING: ICD-10-CM

## 2023-08-25 DIAGNOSIS — E66.9 OBESITY, CLASS I, BMI 30-34.9: ICD-10-CM

## 2023-08-25 RX ORDER — NALTREXONE HYDROCHLORIDE 50 MG/1
TABLET, FILM COATED ORAL
Qty: 30 TABLET | Refills: 0 | Status: SHIPPED | OUTPATIENT
Start: 2023-08-25 | End: 2023-09-08 | Stop reason: SDUPTHER

## 2023-09-08 ENCOUNTER — OFFICE VISIT (OUTPATIENT)
Dept: BARIATRICS | Facility: CLINIC | Age: 63
End: 2023-09-08
Payer: COMMERCIAL

## 2023-09-08 VITALS
DIASTOLIC BLOOD PRESSURE: 79 MMHG | BODY MASS INDEX: 30.81 KG/M2 | RESPIRATION RATE: 16 BRPM | HEART RATE: 87 BPM | SYSTOLIC BLOOD PRESSURE: 125 MMHG | WEIGHT: 163.2 LBS | HEIGHT: 61 IN

## 2023-09-08 DIAGNOSIS — K21.9 GASTROESOPHAGEAL REFLUX DISEASE: ICD-10-CM

## 2023-09-08 DIAGNOSIS — E66.9 OBESITY, CLASS I, BMI 30-34.9: Primary | ICD-10-CM

## 2023-09-08 DIAGNOSIS — R63.8 ABNORMAL CRAVING: ICD-10-CM

## 2023-09-08 PROCEDURE — 99214 OFFICE O/P EST MOD 30 MIN: CPT | Performed by: PHYSICIAN ASSISTANT

## 2023-09-08 RX ORDER — BUPROPION HYDROCHLORIDE 150 MG/1
150 TABLET, EXTENDED RELEASE ORAL 2 TIMES DAILY
Qty: 180 TABLET | Refills: 1 | Status: SHIPPED | OUTPATIENT
Start: 2023-09-08

## 2023-09-08 RX ORDER — NALTREXONE HYDROCHLORIDE 50 MG/1
TABLET, FILM COATED ORAL
Qty: 30 TABLET | Refills: 3 | Status: SHIPPED | OUTPATIENT
Start: 2023-09-08

## 2023-09-08 RX ORDER — CLOBETASOL PROPIONATE 0.5 MG/G
CREAM TOPICAL
COMMUNITY
Start: 2023-07-10

## 2023-09-08 NOTE — ASSESSMENT & PLAN NOTE
-Patient is pursuing conservative program  -Initial weight loss goal of 5-10% weight loss for improved health  -Screening labs 3/21/22    Initial:172.9  Last Visit: 160  Current:166   Change:-12.9lb (change from last OV -6lb )    Goal:  -discussed food tracking-1100 calorie goal and 60gm protein min  -Continue to go to the gym 4 times a week  -Continue to drink at least 60 oz water daily. -recommend adding protein based snack at the end of the day to help with appetite     To continue on naltrexone and buproprion. To do 1/2 tablet of naltrexone in AM and PM.  Denies any side effects      To stop topamax-has not been helping with appetite control. Wean off 1/2 tablet BID x 1 week. Prior was on phentermine but did not lose sufficient weight.

## 2023-09-08 NOTE — PROGRESS NOTES
Assessment/Plan:    Obesity, Class I, BMI 30-34.9  -Patient is pursuing conservative program  -Initial weight loss goal of 5-10% weight loss for improved health  -Screening labs 3/21/22    Initial:172.9  Last Visit: 160  Current:166   Change:-12.9lb (change from last OV -6lb )    Goal:  -discussed food tracking-1100 calorie goal and 60gm protein min  -Continue to go to the gym 4 times a week  -Continue to drink at least 60 oz water daily. -recommend adding protein based snack at the end of the day to help with appetite     To continue on naltrexone and buproprion. To do 1/2 tablet of naltrexone in AM and PM.  Denies any side effects      To stop topamax-has not been helping with appetite control. Wean off 1/2 tablet BID x 1 week. Prior was on phentermine but did not lose sufficient weight. Gastroesophageal reflux disease  On pantoprazole          Follow up in approximately 4 months with Non-Surgical Physician/Advanced Practitioner. Diagnoses and all orders for this visit:    Obesity, Class I, BMI 30-34.9  -     naltrexone (REVIA) 50 mg tablet; Take 1/2 tablet 2 times a day    Abnormal craving  -     naltrexone (REVIA) 50 mg tablet; Take 1/2 tablet 2 times a day  -     buPROPion (WELLBUTRIN SR) 150 mg 12 hr tablet; Take 1 tablet (150 mg total) by mouth 2 (two) times a day    Gastroesophageal reflux disease    Other orders  -     clobetasol (TEMOVATE) 0.05 % cream; APPLY TWICE DAILY TO INSECT BITES FOR UP TO 2 WEEKS/MONTH AS NEEDED. DO NOT APPLY TO FACE. -     Cholecalciferol (VITAMIN D3 PO); Take by mouth          Subjective:   Chief Complaint   Patient presents with   • Follow-up     MWM 4mth f/u, waist 36.5in        Patient ID: Eduard Hernandez  is a 61 y.o. female with excess weight/obesity here to pursue weight managment. Patient is pursuing Conservative Program.     HPI  She has tried to reduce the number of times she is eating.  She john feel the naltrexone and buproprion do work but feels it does wear off and is more hungry later. Her daughter visited for [facility] and diet/exercise were off track. She did just go back to the gym this week. She is starting to log foods  Wt Readings from Last 10 Encounters:   23 74 kg (163 lb 3.2 oz)   23 72.6 kg (160 lb)   23 75 kg (165 lb 6.4 oz)   23 75.4 kg (166 lb 3.2 oz)   23 75.3 kg (166 lb)   23 76.1 kg (167 lb 12.8 oz)   23 75.6 kg (166 lb 9.6 oz)   23 77.2 kg (170 lb 3.2 oz)   22 77 kg (169 lb 12.8 oz)   11/15/22 74.8 kg (165 lb)       Food logging:just started  Increased appetite/cravings:yes  Fruit/Vegetable servings:  Exercise:gym 4 x week just restarted this week  Hydration:at least 60 oz water    [place]akes 7AM  1AM bed  B:bagel, cheese, fruit  S: fruit  L:sandwich- turkey, ham, cheese  D:protein, vegetable, starch  S:Popcorn or peant or  fruit   S:Biscotti    Colonoscopy-Completed    The following portions of the patient's history were reviewed and updated as appropriate:   She  has a past medical history of Cervical cancer (HCC) and GERD (gastroesophageal reflux disease). She   Patient Active Problem List    Diagnosis Date Noted   • Pancreatic insufficiency 2023   • Hormone replacement therapy 2023   • Obesity, Class I, BMI 30-34.9 2022   • Gastroesophageal reflux disease 2022   • H/O bariatric surgery 2022   • Hypothyroidism 2022     She  has a past surgical history that includes Hysterectomy; Oophorectomy (Bilateral); Breast biopsy (Right);  section (1986); Urethral sling (2010); Gastric bypass (2011); Nose surgery; Other surgical history (2019); Colonoscopy (2019); and EGD (2019). Her family history includes Breast cancer in her maternal grandmother; Breast cancer (age of onset: 52) in her cousin;  No Known Problems in her daughter, father, maternal aunt, maternal aunt, maternal aunt, maternal aunt, maternal aunt, maternal aunt, maternal aunt, maternal aunt, maternal aunt, maternal grandfather, mother, paternal aunt, paternal aunt, paternal aunt, paternal aunt, paternal aunt, paternal aunt, paternal grandfather, paternal grandmother, and sister; Thyroid disease in her family. She  reports that she has never smoked. She has never been exposed to tobacco smoke. She has never used smokeless tobacco. She reports that she does not currently use alcohol after a past usage of about 1.0 standard drink of alcohol per week. She reports that she does not use drugs. Current Outpatient Medications   Medication Sig Dispense Refill   • buPROPion (WELLBUTRIN SR) 150 mg 12 hr tablet Take 1 tablet (150 mg total) by mouth 2 (two) times a day 180 tablet 1   • Calcium Carbonate 1500 (600 Ca) MG TABS 2 TABLETS TWICE A  tablet 2   • Cholecalciferol (VITAMIN D3 PO) Take by mouth     • clobetasol (TEMOVATE) 0.05 % cream APPLY TWICE DAILY TO INSECT BITES FOR UP TO 2 WEEKS/MONTH AS NEEDED. DO NOT APPLY TO FACE. • Estradiol 6 MG PLLT 6 mg every 4 (four) months     • levocetirizine (XYZAL) 5 MG tablet Take 5 mg by mouth every evening     • levothyroxine 112 mcg tablet Take 112 mcg by mouth daily     • loratadine (CLARITIN) 10 mg tablet Take 10 mg by mouth daily     • magnesium oxide (MAG-OX) 400 mg tablet Take 400 mg by mouth daily at bedtime     • Multiple Vitamins-Minerals (BARIATRIC FUSION PO) Take 1 tablet by mouth daily     • naltrexone (REVIA) 50 mg tablet Take 1/2 tablet 2 times a day 30 tablet 3   • pantoprazole (PROTONIX) 40 mg tablet Take 40 mg by mouth daily     • Testosterone 50 MG PLLT 50 mg every 4 (four) months     • Zenpep [id number] units CPEP Take 5 capsules by mouth daily Takes 5-7 caps/day     • Calcium Carbonate 1500 (600 Ca) MG TABS      • cholecalciferol (VITAMIN D3) 1,000 units tablet Take 2 tablets by mouth daily       No current facility-administered medications for this visit.      Current Outpatient Medications on File Prior to Visit   Medication Sig   • Calcium Carbonate 1500 (600 Ca) MG TABS 2 TABLETS TWICE A DAY   • Cholecalciferol (VITAMIN D3 PO) Take by mouth   • clobetasol (TEMOVATE) 0.05 % cream APPLY TWICE DAILY TO INSECT BITES FOR UP TO 2 WEEKS/MONTH AS NEEDED. DO NOT APPLY TO FACE. • Estradiol 6 MG PLLT 6 mg every 4 (four) months   • levocetirizine (XYZAL) 5 MG tablet Take 5 mg by mouth every evening   • levothyroxine 112 mcg tablet Take 112 mcg by mouth daily   • loratadine (CLARITIN) 10 mg tablet Take 10 mg by mouth daily   • magnesium oxide (MAG-OX) 400 mg tablet Take 400 mg by mouth daily at bedtime   • Multiple Vitamins-Minerals (BARIATRIC FUSION PO) Take 1 tablet by mouth daily   • pantoprazole (PROTONIX) 40 mg tablet Take 40 mg by mouth daily   • Testosterone 50 MG PLLT 50 mg every 4 (four) months   • Zenpep [id number] units CPEP Take 5 capsules by mouth daily Takes 5-7 caps/day   • [DISCONTINUED] buPROPion (WELLBUTRIN SR) 150 mg 12 hr tablet TAKE 1 TABLET BY MOUTH TWICE A DAY   • [DISCONTINUED] naltrexone (REVIA) 50 mg tablet TAKE 1/2 A TABLET A DAY FOR 2 WEEKS THEN TAKE 1 TABLET DAILY   • Calcium Carbonate 1500 (600 Ca) MG TABS    • cholecalciferol (VITAMIN D3) 1,000 units tablet Take 2 tablets by mouth daily   • [DISCONTINUED] topiramate (Topamax) 50 MG tablet Take 1 tablet (50 mg total) by mouth every 12 (twelve) hours     No current facility-administered medications on file prior to visit. She is allergic to latex. .    Review of Systems   Constitutional: Negative for fever. Respiratory: Negative for shortness of breath. Cardiovascular: Negative for chest pain and palpitations. Gastrointestinal: Negative for abdominal pain, constipation, diarrhea and vomiting. Genitourinary: Negative for difficulty urinating. Skin: Negative for rash. Neurological: Negative for headaches. Psychiatric/Behavioral: Negative for dysphoric mood. The patient is not nervous/anxious.         Objective:    /79 Pulse 87   Resp 16   Ht 5' 1" (1.549 m)   Wt 74 kg (163 lb 3.2 oz)   BMI 30.84 kg/m²      Physical Exam  Vitals and nursing note reviewed. Constitutional:       General: She is not in acute distress. Appearance: She is well-developed. She is obese. HENT:      Head: Normocephalic and atraumatic. Eyes:      Conjunctiva/sclera: Conjunctivae normal.   Neck:      Thyroid: No thyromegaly. Pulmonary:      Effort: Pulmonary effort is normal. No respiratory distress. Skin:     Findings: No rash (visible). Neurological:      Mental Status: She is alert and oriented to person, place, and time.    Psychiatric:         Mood and Affect: Mood normal.         Behavior: Behavior normal.

## 2023-10-08 ENCOUNTER — APPOINTMENT (OUTPATIENT)
Dept: LAB | Age: 63
End: 2023-10-08
Payer: COMMERCIAL

## 2023-10-08 DIAGNOSIS — E21.3 HYPERPARATHYROIDISM (HCC): ICD-10-CM

## 2023-10-08 DIAGNOSIS — E03.9 HYPOTHYROIDISM, UNSPECIFIED TYPE: ICD-10-CM

## 2023-10-08 DIAGNOSIS — Z98.84 H/O BARIATRIC SURGERY: ICD-10-CM

## 2023-10-08 LAB
25(OH)D3 SERPL-MCNC: 55.7 NG/ML (ref 30–100)
ALBUMIN SERPL BCP-MCNC: 4 G/DL (ref 3.5–5)
ALP SERPL-CCNC: 59 U/L (ref 34–104)
ALT SERPL W P-5'-P-CCNC: 16 U/L (ref 7–52)
ANION GAP SERPL CALCULATED.3IONS-SCNC: 4 MMOL/L
AST SERPL W P-5'-P-CCNC: 16 U/L (ref 13–39)
BILIRUB SERPL-MCNC: 0.37 MG/DL (ref 0.2–1)
BUN SERPL-MCNC: 16 MG/DL (ref 5–25)
CALCIUM SERPL-MCNC: 9.4 MG/DL (ref 8.4–10.2)
CHLORIDE SERPL-SCNC: 108 MMOL/L (ref 96–108)
CO2 SERPL-SCNC: 27 MMOL/L (ref 21–32)
CREAT SERPL-MCNC: 0.73 MG/DL (ref 0.6–1.3)
GFR SERPL CREATININE-BSD FRML MDRD: 87 ML/MIN/1.73SQ M
GLUCOSE P FAST SERPL-MCNC: 91 MG/DL (ref 65–99)
PHOSPHATE SERPL-MCNC: 3.5 MG/DL (ref 2.3–4.1)
POTASSIUM SERPL-SCNC: 4.4 MMOL/L (ref 3.5–5.3)
PROT SERPL-MCNC: 7.3 G/DL (ref 6.4–8.4)
PTH-INTACT SERPL-MCNC: 94 PG/ML (ref 12–88)
SODIUM SERPL-SCNC: 139 MMOL/L (ref 135–147)
T4 FREE SERPL-MCNC: 0.95 NG/DL (ref 0.61–1.12)
TSH SERPL DL<=0.05 MIU/L-ACNC: 2.48 UIU/ML (ref 0.45–4.5)

## 2023-10-08 PROCEDURE — 84100 ASSAY OF PHOSPHORUS: CPT

## 2023-10-08 PROCEDURE — 80053 COMPREHEN METABOLIC PANEL: CPT

## 2023-10-08 PROCEDURE — 83970 ASSAY OF PARATHORMONE: CPT

## 2023-10-08 PROCEDURE — 84439 ASSAY OF FREE THYROXINE: CPT

## 2023-10-08 PROCEDURE — 84443 ASSAY THYROID STIM HORMONE: CPT

## 2023-10-08 PROCEDURE — 82306 VITAMIN D 25 HYDROXY: CPT

## 2023-10-08 PROCEDURE — 36415 COLL VENOUS BLD VENIPUNCTURE: CPT

## 2023-10-09 DIAGNOSIS — Z98.84 H/O BARIATRIC SURGERY: ICD-10-CM

## 2023-10-09 DIAGNOSIS — E03.9 HYPOTHYROIDISM, UNSPECIFIED TYPE: ICD-10-CM

## 2023-10-09 DIAGNOSIS — E21.3 HYPERPARATHYROIDISM (HCC): Primary | ICD-10-CM

## 2023-12-06 DIAGNOSIS — E21.3 HYPERPARATHYROIDISM (HCC): ICD-10-CM

## 2024-01-18 ENCOUNTER — OFFICE VISIT (OUTPATIENT)
Dept: BARIATRICS | Facility: CLINIC | Age: 64
End: 2024-01-18
Payer: COMMERCIAL

## 2024-01-18 VITALS
BODY MASS INDEX: 30.63 KG/M2 | WEIGHT: 156 LBS | DIASTOLIC BLOOD PRESSURE: 84 MMHG | SYSTOLIC BLOOD PRESSURE: 128 MMHG | RESPIRATION RATE: 16 BRPM | HEART RATE: 77 BPM | HEIGHT: 60 IN

## 2024-01-18 DIAGNOSIS — E03.9 HYPOTHYROIDISM: ICD-10-CM

## 2024-01-18 DIAGNOSIS — E66.9 OBESITY, CLASS I, BMI 30-34.9: Primary | ICD-10-CM

## 2024-01-18 DIAGNOSIS — R63.8 ABNORMAL CRAVING: ICD-10-CM

## 2024-01-18 DIAGNOSIS — E21.3 HYPERPARATHYROIDISM (HCC): ICD-10-CM

## 2024-01-18 PROCEDURE — 99214 OFFICE O/P EST MOD 30 MIN: CPT | Performed by: PHYSICIAN ASSISTANT

## 2024-01-18 RX ORDER — NIRMATRELVIR AND RITONAVIR 300-100 MG
KIT ORAL
COMMUNITY
Start: 2023-11-05 | End: 2024-01-18

## 2024-01-18 RX ORDER — CALCIUM CARBONATE 600 MG
2 TABLET ORAL 2 TIMES DAILY
COMMUNITY
Start: 2024-01-11

## 2024-01-18 RX ORDER — BUPROPION HYDROCHLORIDE 100 MG/1
100 TABLET ORAL 3 TIMES DAILY
Qty: 90 TABLET | Refills: 3 | Status: SHIPPED | OUTPATIENT
Start: 2024-01-18

## 2024-01-18 RX ORDER — NALTREXONE HYDROCHLORIDE 50 MG/1
TABLET, FILM COATED ORAL
Qty: 30 TABLET | Refills: 3 | Status: SHIPPED | OUTPATIENT
Start: 2024-01-18 | End: 2024-01-25 | Stop reason: SDUPTHER

## 2024-01-18 NOTE — ASSESSMENT & PLAN NOTE
-Patient is pursuing conservative program  -Initial weight loss goal of 5-10% weight loss for improved health  -Screening labs 3/21/22    Initial:172.9  Last Visit: 166  Current:156  Change:-16.9lb (change from last OV -10lb )    Goal:  -discussed food tracking-1100 calorie goal and 60gm protein min  -Continue to go to the gym 4 times a week  -Continue to drink at least 60 oz water daily. Discussed adding on clear protein shake like seeq       To continue on naltrexone and buproprion. To do 1/2 tablet of naltrexone in AM and PM.  To switch to bupropion 100mg TID as she does not feel the SR release is working consistently. Denies any side effects    She was on topamax prior but did not lose significant weight.

## 2024-01-18 NOTE — PROGRESS NOTES
Assessment/Plan:    Obesity, Class I, BMI 30-34.9  -Patient is pursuing conservative program  -Initial weight loss goal of 5-10% weight loss for improved health  -Screening labs 3/21/22    Initial:172.9  Last Visit: 166  Current:156  Change:-16.9lb (change from last OV -10lb )    Goal:  -discussed food tracking-1100 calorie goal and 60gm protein min  -Continue to go to the gym 4 times a week  -Continue to drink at least 60 oz water daily. Discussed adding on clear protein shake like seeq       To continue on naltrexone and buproprion. To do 1/2 tablet of naltrexone in AM and PM.  To switch to bupropion 100mg TID as she does not feel the SR release is working consistently. Denies any side effects    She was on topamax prior but did not lose significant weight.    Hypothyroidism  Will be doing recheck in the next 2 weeks    Hyperparathyroidism (HCC)  Will be having recheck this month and following with endocrinology        Follow up in approximately  4 months  with Non-Surgical Physician/Advanced Practitioner.     Diagnoses and all orders for this visit:    Obesity, Class I, BMI 30-34.9  -     buPROPion (WELLBUTRIN) 100 mg tablet; Take 1 tablet (100 mg total) by mouth 3 (three) times a day  -     naltrexone (REVIA) 50 mg tablet; Take 1/2 tablet 2 times a day    Abnormal craving  -     naltrexone (REVIA) 50 mg tablet; Take 1/2 tablet 2 times a day    Hypothyroidism    Hyperparathyroidism (HCC)    Other orders  -     [facility] Calcium 600 MG tablet; Take 2 tablets by mouth 2 (two) times a day  -     Discontinue: Paxlovid, 300/100, tablet therapy pack; TAKE 2 TABLETS (NIRMATRELVIR) AND TAKE 1 TABLET (RITONAVIR) BY MOUTH TWICE A DAY FOR 5 DAYS  -     Sod Fluoride-Potassium Nitrate 1.1-5 % PSTE; BRUSH FOR 2 MINS BEFORE BEDTIME. SPIT OUT BUT DO NOT RINSE. NO EATING OR DRINKNG FOR 30 MINUTES.          Subjective:   Chief Complaint   Patient presents with    Follow-up     MWM 4m f/u; Waist-34.5in        Patient ID: Dayanara Ronnie   is a 63 y.o. female with excess weight/obesity here to pursue weight managment.  Patient is pursuing Conservative Program.     HPI  She has been doing well.  She is taking wellbutrin and naltrrexone.  She does feel more hungry towards night. She doesn't typically go to be until after 12.  She does get well rested sleep.   Wt Readings from Last 10 Encounters:   24 70.8 kg (156 lb)   23 74 kg (163 lb 3.2 oz)   23 72.6 kg (160 lb)   23 75 kg (165 lb 6.4 oz)   23 75.4 kg (166 lb 3.2 oz)   23 75.3 kg (166 lb)   23 76.1 kg (167 lb 12.8 oz)   23 75.6 kg (166 lb 9.6 oz)   23 77.2 kg (170 lb 3.2 oz)   22 77 kg (169 lb 12.8 oz)       Food logging:yes writting  Increased appetite/cravings:  Exercise:every other day cardio/weights.  Will be working with .  Hydration:water more than 60 oz flavored  Sleep: improved , usually around 6 hours      Eggs  [place] w/turkey or salad or leftovers.   Protein, vegetable, starch  The following portions of the patient's history were reviewed and updated as appropriate: She  has a past medical history of Cervical cancer (HCC) and GERD (gastroesophageal reflux disease).  She   Patient Active Problem List    Diagnosis Date Noted    Hyperparathyroidism (HCC) 2024    Pancreatic insufficiency 2023    Hormone replacement therapy 2023    Obesity, Class I, BMI 30-34.9 2022    Gastroesophageal reflux disease 2022    H/O bariatric surgery 2022    Hypothyroidism 2022     She  has a past surgical history that includes Hysterectomy; Oophorectomy (Bilateral); Breast biopsy (Right);  section (1986); Urethral sling (2010); Gastric bypass (2011); Nose surgery; Other surgical history (2019); Colonoscopy (2019); and EGD (2019).  Her family history includes Breast cancer in her maternal grandmother; Breast cancer (age of onset: 47) in her cousin; No  Known Problems in her daughter, father, maternal aunt, maternal aunt, maternal aunt, maternal aunt, maternal aunt, maternal aunt, maternal aunt, maternal aunt, maternal aunt, maternal grandfather, mother, paternal aunt, paternal aunt, paternal aunt, paternal aunt, paternal aunt, paternal aunt, paternal grandfather, paternal grandmother, and sister; Thyroid disease in her family.  She  reports that she has never smoked. She has never been exposed to tobacco smoke. She has never used smokeless tobacco. She reports that she does not currently use alcohol after a past usage of about 1.0 standard drink of alcohol per week. She reports that she does not use drugs.  Current Outpatient Medications   Medication Sig Dispense Refill    buPROPion (WELLBUTRIN) 100 mg tablet Take 1 tablet (100 mg total) by mouth 3 (three) times a day 90 tablet 3    Calcium Carbonate 1500 (600 Ca) MG TABS 2 tabs twice a day 360 tablet 2    Cholecalciferol (VITAMIN D3 PO) Take by mouth      cholecalciferol (VITAMIN D3) 1,000 units tablet Take 2 tablets by mouth daily      clobetasol (TEMOVATE) 0.05 % cream APPLY TWICE DAILY TO INSECT BITES FOR UP TO 2 WEEKS/MONTH AS NEEDED. DO NOT APPLY TO FACE.      [facility] Calcium 600 MG tablet Take 2 tablets by mouth 2 (two) times a day      Estradiol 6 MG PLLT 6 mg every 4 (four) months      levothyroxine 112 mcg tablet Take 112 mcg by mouth daily      loratadine (CLARITIN) 10 mg tablet Take 10 mg by mouth daily      magnesium oxide (MAG-OX) 400 mg tablet Take 400 mg by mouth daily at bedtime      Multiple Vitamins-Minerals (BARIATRIC FUSION PO) Take 1 tablet by mouth daily      naltrexone (REVIA) 50 mg tablet Take 1/2 tablet 2 times a day 30 tablet 3    pantoprazole (PROTONIX) 40 mg tablet Take 40 mg by mouth daily      Sod Fluoride-Potassium Nitrate 1.1-5 % PSTE BRUSH FOR 2 MINS BEFORE BEDTIME. SPIT OUT BUT DO NOT RINSE. NO EATING OR DRINKNG FOR 30 MINUTES.      Testosterone 50 MG PLLT 50 mg every 4 (four) months       Zenpep [id number] units CPEP Take 5 capsules by mouth daily Takes 5-7 caps/day      Calcium Carbonate 1500 (600 Ca) MG TABS  (Patient not taking: Reported on 1/18/2024)       No current facility-administered medications for this visit.     Current Outpatient Medications on File Prior to Visit   Medication Sig    Calcium Carbonate 1500 (600 Ca) MG TABS 2 tabs twice a day    Cholecalciferol (VITAMIN D3 PO) Take by mouth    cholecalciferol (VITAMIN D3) 1,000 units tablet Take 2 tablets by mouth daily    clobetasol (TEMOVATE) 0.05 % cream APPLY TWICE DAILY TO INSECT BITES FOR UP TO 2 WEEKS/MONTH AS NEEDED. DO NOT APPLY TO FACE.    [facility]S Calcium 600 MG tablet Take 2 tablets by mouth 2 (two) times a day    Estradiol 6 MG PLLT 6 mg every 4 (four) months    levothyroxine 112 mcg tablet Take 112 mcg by mouth daily    loratadine (CLARITIN) 10 mg tablet Take 10 mg by mouth daily    magnesium oxide (MAG-OX) 400 mg tablet Take 400 mg by mouth daily at bedtime    Multiple Vitamins-Minerals (BARIATRIC FUSION PO) Take 1 tablet by mouth daily    pantoprazole (PROTONIX) 40 mg tablet Take 40 mg by mouth daily    Sod Fluoride-Potassium Nitrate 1.1-5 % PSTE BRUSH FOR 2 MINS BEFORE BEDTIME. SPIT OUT BUT DO NOT RINSE. NO EATING OR DRINKNG FOR 30 MINUTES.    Testosterone 50 MG PLLT 50 mg every 4 (four) months    Zenpep [id number] units CPEP Take 5 capsules by mouth daily Takes 5-7 caps/day    [DISCONTINUED] buPROPion (WELLBUTRIN SR) 150 mg 12 hr tablet Take 1 tablet (150 mg total) by mouth 2 (two) times a day    [DISCONTINUED] naltrexone (REVIA) 50 mg tablet Take 1/2 tablet 2 times a day    [DISCONTINUED] Paxlovid, 300/100, tablet therapy pack TAKE 2 TABLETS (NIRMATRELVIR) AND TAKE 1 TABLET (RITONAVIR) BY MOUTH TWICE A DAY FOR 5 DAYS    Calcium Carbonate 1500 (600 Ca) MG TABS  (Patient not taking: Reported on 1/18/2024)    [DISCONTINUED] levocetirizine (XYZAL) 5 MG tablet Take 5 mg by mouth every evening (Patient not taking:  Reported on 1/18/2024)     No current facility-administered medications on file prior to visit.     She is allergic to latex..    Review of Systems   Constitutional:  Negative for fever.   Respiratory:  Negative for shortness of breath.    Cardiovascular:  Negative for chest pain and palpitations.   Gastrointestinal:  Negative for abdominal pain, constipation, diarrhea and vomiting.   Genitourinary:  Negative for difficulty urinating.   Musculoskeletal:  Negative for arthralgias and back pain.   Skin:  Negative for rash.        Hair thinning   Neurological:  Negative for headaches.   Psychiatric/Behavioral:  Negative for dysphoric mood. The patient is not nervous/anxious.        Objective:    /84   Pulse 77   Resp 16   Ht 5' (1.524 m)   Wt 70.8 kg (156 lb)   BMI 30.47 kg/m²      Physical Exam  Vitals and nursing note reviewed.   Constitutional:       General: She is not in acute distress.     Appearance: She is well-developed. She is obese.   HENT:      Head: Normocephalic and atraumatic.   Eyes:      Conjunctiva/sclera: Conjunctivae normal.   Neck:      Thyroid: No thyromegaly.   Pulmonary:      Effort: Pulmonary effort is normal. No respiratory distress.   Skin:     Findings: No rash (visible).   Neurological:      Mental Status: She is alert and oriented to person, place, and time.   Psychiatric:         Behavior: Behavior normal.

## 2024-01-21 ENCOUNTER — APPOINTMENT (OUTPATIENT)
Dept: LAB | Age: 64
End: 2024-01-21
Payer: COMMERCIAL

## 2024-01-21 DIAGNOSIS — Z98.84 H/O BARIATRIC SURGERY: ICD-10-CM

## 2024-01-21 DIAGNOSIS — E21.3 HYPERPARATHYROIDISM (HCC): ICD-10-CM

## 2024-01-21 DIAGNOSIS — E03.9 HYPOTHYROIDISM, UNSPECIFIED TYPE: ICD-10-CM

## 2024-01-21 LAB
25(OH)D3 SERPL-MCNC: 55 NG/ML (ref 30–100)
ALBUMIN SERPL BCP-MCNC: 3.9 G/DL (ref 3.5–5)
ALP SERPL-CCNC: 47 U/L (ref 34–104)
ALT SERPL W P-5'-P-CCNC: 19 U/L (ref 7–52)
ANION GAP SERPL CALCULATED.3IONS-SCNC: 6 MMOL/L
AST SERPL W P-5'-P-CCNC: 19 U/L (ref 13–39)
BILIRUB SERPL-MCNC: 0.45 MG/DL (ref 0.2–1)
BUN SERPL-MCNC: 14 MG/DL (ref 5–25)
CALCIUM SERPL-MCNC: 8.8 MG/DL (ref 8.4–10.2)
CHLORIDE SERPL-SCNC: 106 MMOL/L (ref 96–108)
CO2 SERPL-SCNC: 27 MMOL/L (ref 21–32)
CREAT SERPL-MCNC: 0.71 MG/DL (ref 0.6–1.3)
GFR SERPL CREATININE-BSD FRML MDRD: 90 ML/MIN/1.73SQ M
GLUCOSE P FAST SERPL-MCNC: 85 MG/DL (ref 65–99)
MAGNESIUM SERPL-MCNC: 2.1 MG/DL (ref 1.9–2.7)
POTASSIUM SERPL-SCNC: 4.2 MMOL/L (ref 3.5–5.3)
PROT SERPL-MCNC: 6.7 G/DL (ref 6.4–8.4)
PTH-INTACT SERPL-MCNC: 98.7 PG/ML (ref 12–88)
SODIUM SERPL-SCNC: 139 MMOL/L (ref 135–147)
T4 FREE SERPL-MCNC: 1.14 NG/DL (ref 0.61–1.12)
TSH SERPL DL<=0.05 MIU/L-ACNC: 1.84 UIU/ML (ref 0.45–4.5)

## 2024-01-21 PROCEDURE — 83735 ASSAY OF MAGNESIUM: CPT

## 2024-01-21 PROCEDURE — 80053 COMPREHEN METABOLIC PANEL: CPT

## 2024-01-21 PROCEDURE — 84439 ASSAY OF FREE THYROXINE: CPT

## 2024-01-21 PROCEDURE — 83970 ASSAY OF PARATHORMONE: CPT

## 2024-01-21 PROCEDURE — 82306 VITAMIN D 25 HYDROXY: CPT

## 2024-01-21 PROCEDURE — 84443 ASSAY THYROID STIM HORMONE: CPT

## 2024-01-21 PROCEDURE — 36415 COLL VENOUS BLD VENIPUNCTURE: CPT

## 2024-01-22 ENCOUNTER — TELEPHONE (OUTPATIENT)
Dept: BARIATRICS | Facility: CLINIC | Age: 64
End: 2024-01-22

## 2024-01-22 NOTE — TELEPHONE ENCOUNTER
I spoke with [facility] pharmacy and was informed that wellburtin is ready to  and naltrexone is out of stock. Pharmacy doesn't know when they have it available. Patient was informed.

## 2024-01-25 DIAGNOSIS — E66.9 OBESITY, CLASS I, BMI 30-34.9: ICD-10-CM

## 2024-01-25 DIAGNOSIS — R63.8 ABNORMAL CRAVING: ICD-10-CM

## 2024-01-25 RX ORDER — NALTREXONE HYDROCHLORIDE 50 MG/1
TABLET, FILM COATED ORAL
Qty: 90 TABLET | Refills: 0 | Status: SHIPPED | OUTPATIENT
Start: 2024-01-25

## 2024-01-31 ENCOUNTER — OFFICE VISIT (OUTPATIENT)
Dept: ENDOCRINOLOGY | Facility: CLINIC | Age: 64
End: 2024-01-31
Payer: COMMERCIAL

## 2024-01-31 VITALS
DIASTOLIC BLOOD PRESSURE: 82 MMHG | WEIGHT: 155.2 LBS | HEART RATE: 93 BPM | SYSTOLIC BLOOD PRESSURE: 126 MMHG | OXYGEN SATURATION: 97 % | HEIGHT: 61 IN | BODY MASS INDEX: 29.3 KG/M2

## 2024-01-31 DIAGNOSIS — E61.1 IRON DEFICIENCY: ICD-10-CM

## 2024-01-31 DIAGNOSIS — E53.8 B12 DEFICIENCY: ICD-10-CM

## 2024-01-31 DIAGNOSIS — Z98.84 H/O BARIATRIC SURGERY: ICD-10-CM

## 2024-01-31 DIAGNOSIS — E21.3 HYPERPARATHYROIDISM (HCC): Primary | ICD-10-CM

## 2024-01-31 PROCEDURE — 99214 OFFICE O/P EST MOD 30 MIN: CPT | Performed by: INTERNAL MEDICINE

## 2024-01-31 RX ORDER — CALCIUM CARBONATE 600 MG
TABLET ORAL
Start: 2024-01-31 | End: 2024-02-06 | Stop reason: SDUPTHER

## 2024-01-31 NOTE — PROGRESS NOTES
1/31/2024    Assessment/Plan      Diagnoses and all orders for this visit:    Hyperparathyroidism (HCC)  -     [facility] Calcium 600 MG tablet; 2 tabs three times daily with meals  -     Comprehensive metabolic panel; Future  -     PTH, intact; Future  -     Vitamin D 25 hydroxy; Future  -     Magnesium; Future  -     Phosphorus; Future    H/O bariatric surgery  -     Vitamin B12; Future  -     CBC; Future  -     Iron Panel (Includes Ferritin, Iron Sat%, Iron, and TIBC); Future    Iron deficiency  -     Vitamin B12; Future  -     CBC; Future  -     Iron Panel (Includes Ferritin, Iron Sat%, Iron, and TIBC); Future    B12 deficiency  -     Vitamin B12; Future  -     CBC; Future  -     Iron Panel (Includes Ferritin, Iron Sat%, Iron, and TIBC); Future        Assessment/Plan:  1.  Hyperparathyroidism: Suspected to be secondary hyperparathyroidism in the setting of history of Alex-en-Y gastric bypass surgery.  Calcium remains low end of normal range.  Recommended increasing her calcium carbonate to 2 tablets 3 times daily and repeat labs in 1-2 months to monitor trend.  If calcium were to increase and become elevated would pursue additional evaluation for primary hyperparathyroidism.    2.  History of vitamin deficiencies and bariatric surgery: With a neck set of labs I have recommended we update an iron panel and B12 level.      CC: Follow-up    History of Present Illness     HPI: Dayanara Trujillo is a 63 y.o. year old female who presents for a follow-up appointment for history of hyperparathyroidism.  This was discovered in lab work.  She does have history of Alex-en-Y gastric bypass surgery in 2011.  She continues on calcium and vitamin D supplementation.  She also has history of hypothyroidism treated with levothyroxine 112 mcg daily.  She continues on calcium carbonate 600 mg tabs and takes 2 tablets twice a day and vitamin D she supplements 2000 units daily.  She presents today overall feeling okay.  She does note more  muscle and joint aches and pains as well as brittle hair over the past few months.  Otherwise no new health issues or symptoms of concern.    Review of Systems   Constitutional:  Negative for fatigue.   HENT:  Negative for trouble swallowing and voice change.    Eyes:  Negative for visual disturbance.   Respiratory:  Negative for shortness of breath.    Cardiovascular:  Negative for palpitations and leg swelling.   Gastrointestinal:  Negative for abdominal pain, nausea and vomiting.   Endocrine: Negative for polydipsia and polyuria.   Musculoskeletal:  Negative for arthralgias and myalgias.   Skin:  Negative for rash.   Neurological:  Negative for dizziness, tremors and weakness.   Hematological:  Negative for adenopathy.   Psychiatric/Behavioral:  Negative for agitation and confusion.        Historical Information   Past Medical History:   Diagnosis Date    Cervical cancer (HCC)     GERD (gastroesophageal reflux disease)      Past Surgical History:   Procedure Laterality Date    BREAST BIOPSY Right      SECTION  1986    COLONOSCOPY  2019    2nd procedure    EGD  2019    GASTRIC BYPASS  2011    HYSTERECTOMY      Age 48    NOSE SURGERY      removal of a growth in the right nostril    OOPHORECTOMY Bilateral     Age 48    OTHER SURGICAL HISTORY  2019    Cervical Intraepithelial Neioplasia Grage 2 Removal    URETHRAL SLING  2010     Social History   Social History     Substance and Sexual Activity   Alcohol Use Not Currently    Alcohol/week: 1.0 standard drink of alcohol    Types: 1 Shots of liquor per week    Comment: occ     Social History     Substance and Sexual Activity   Drug Use Never     Social History     Tobacco Use   Smoking Status Never    Passive exposure: Never   Smokeless Tobacco Never     Family History:   Family History   Problem Relation Age of Onset    No Known Problems Mother     No Known Problems Father     No Known Problems Sister     No Known Problems  Daughter     Breast cancer Maternal Grandmother         unknown age    No Known Problems Maternal Grandfather     No Known Problems Paternal Grandmother     No Known Problems Paternal Grandfather     No Known Problems Maternal Aunt     No Known Problems Maternal Aunt     No Known Problems Maternal Aunt     No Known Problems Maternal Aunt     No Known Problems Maternal Aunt     No Known Problems Maternal Aunt     No Known Problems Maternal Aunt     No Known Problems Maternal Aunt     No Known Problems Maternal Aunt     No Known Problems Paternal Aunt     No Known Problems Paternal Aunt     No Known Problems Paternal Aunt     No Known Problems Paternal Aunt     No Known Problems Paternal Aunt     No Known Problems Paternal Aunt     Thyroid disease Family     Breast cancer Cousin 47       Meds/Allergies   Current Outpatient Medications   Medication Sig Dispense Refill    buPROPion (WELLBUTRIN) 100 mg tablet Take 1 tablet (100 mg total) by mouth 3 (three) times a day 90 tablet 3    cholecalciferol (VITAMIN D3) 1,000 units tablet Take 2 tablets by mouth daily      clobetasol (TEMOVATE) 0.05 % cream APPLY TWICE DAILY TO INSECT BITES FOR UP TO 2 WEEKS/MONTH AS NEEDED. DO NOT APPLY TO FACE.      CVS Calcium 600 MG tablet 2 tabs three times daily with meals      Estradiol 6 MG PLLT 6 mg every 4 (four) months      levothyroxine 112 mcg tablet Take 112 mcg by mouth daily      magnesium oxide (MAG-OX) 400 mg tablet Take 400 mg by mouth daily at bedtime      Multiple Vitamins-Minerals (BARIATRIC FUSION PO) Take 1 tablet by mouth daily      naltrexone (REVIA) 50 mg tablet Take 1/2 tablet 2 times a day 90 tablet 0    pantoprazole (PROTONIX) 40 mg tablet Take 40 mg by mouth daily      Testosterone 50 MG PLLT 50 mg every 4 (four) months      Zenpep [id number] units CPEP Take 5 capsules by mouth daily Takes 5-7 caps/day      loratadine (CLARITIN) 10 mg tablet Take 10 mg by mouth daily (Patient not taking: Reported on 1/31/2024)    "   Sod Fluoride-Potassium Nitrate 1.1-5 % PSTE BRUSH FOR 2 MINS BEFORE BEDTIME. SPIT OUT BUT DO NOT RINSE. NO EATING OR DRINKNG FOR 30 MINUTES.       No current facility-administered medications for this visit.     Allergies   Allergen Reactions    Latex Rash       Objective   Vitals: Blood pressure 126/82, pulse 93, height 5' 1\" (1.549 m), weight 70.4 kg (155 lb 3.2 oz), SpO2 97%, not currently breastfeeding.  Invasive Devices       None                   Physical Exam  Vitals reviewed.   Constitutional:       General: She is not in acute distress.     Appearance: She is well-developed. She is not diaphoretic.   HENT:      Head: Normocephalic and atraumatic.   Eyes:      Conjunctiva/sclera: Conjunctivae normal.      Pupils: Pupils are equal, round, and reactive to light.   Neck:      Thyroid: No thyromegaly.   Cardiovascular:      Rate and Rhythm: Normal rate and regular rhythm.   Pulmonary:      Effort: Pulmonary effort is normal. No respiratory distress.      Breath sounds: Normal breath sounds.   Abdominal:      General: Bowel sounds are normal.      Palpations: Abdomen is soft.   Musculoskeletal:         General: Normal range of motion.      Cervical back: Normal range of motion and neck supple.   Skin:     General: Skin is warm and dry.      Findings: No rash.   Neurological:      Mental Status: She is alert and oriented to person, place, and time.      Motor: No abnormal muscle tone.   Psychiatric:         Behavior: Behavior normal.         The history was obtained from the review of the chart and from the patient.    Lab Results:      Component      Latest Ref Rng 1/21/2024   Sodium      135 - 147 mmol/L 139    Potassium      3.5 - 5.3 mmol/L 4.2    Chloride      96 - 108 mmol/L 106    Carbon Dioxide      21 - 32 mmol/L 27    ANION GAP      mmol/L 6    BUN      5 - 25 mg/dL 14    Creatinine      0.60 - 1.30 mg/dL 0.71    GLUCOSE, FASTING      65 - 99 mg/dL 85    Calcium      8.4 - 10.2 mg/dL 8.8    AST      " "13 - 39 U/L 19    ALT      7 - 52 U/L 19    ALK PHOS      34 - 104 U/L 47    Total Protein      6.4 - 8.4 g/dL 6.7    Albumin      3.5 - 5.0 g/dL 3.9    Total Bilirubin      0.20 - 1.00 mg/dL 0.45    GFR, Calculated      ml/min/1.73sq m 90    PARATHYROID HORMONE      12.0 - 88.0 pg/mL 98.7 (H)    Vit D, 25-Hydroxy      30.0 - 100.0 ng/mL 55.0    TSH 3RD GENERATON      0.450 - 4.500 uIU/mL 1.837    Free T4      0.61 - 1.12 ng/dL 1.14 (H)    MAGNESIUM      1.9 - 2.7 mg/dL 2.1       Legend:  (H) High    Future Appointments   Date Time Provider Department Center   1/31/2024 10:10 AM DO [facility]   2/21/2024 10:00 AM Carolynn Elam PA-C [facility]   5/20/2024 10:00 AM Jelly James PA-C [facility]   8/27/2024 10:20 AM [facility]       Portions of the record may have been created with voice recognition software. Occasional wrong word or \"sound a like\" substitutions may have occurred due to the inherent limitations of voice recognition software. Read the chart carefully and recognize, using context, where substitutions have occurred.    "

## 2024-02-06 DIAGNOSIS — E21.3 HYPERPARATHYROIDISM (HCC): ICD-10-CM

## 2024-02-06 RX ORDER — CALCIUM CARBONATE 600 MG
TABLET ORAL
Qty: 180 TABLET | Refills: 2 | Status: SHIPPED | OUTPATIENT
Start: 2024-02-06

## 2024-02-15 DIAGNOSIS — E66.9 OBESITY, CLASS I, BMI 30-34.9: Primary | ICD-10-CM

## 2024-02-15 DIAGNOSIS — E66.9 OBESITY, CLASS I, BMI 30-34.9: ICD-10-CM

## 2024-02-15 RX ORDER — BUPROPION HYDROCHLORIDE 100 MG/1
100 TABLET ORAL 3 TIMES DAILY
Qty: 270 TABLET | Refills: 2 | OUTPATIENT
Start: 2024-02-15

## 2024-02-15 RX ORDER — BUPROPION HYDROCHLORIDE 100 MG/1
100 TABLET ORAL 3 TIMES DAILY
Qty: 270 TABLET | Refills: 0 | Status: SHIPPED | OUTPATIENT
Start: 2024-02-15

## 2024-02-21 ENCOUNTER — OFFICE VISIT (OUTPATIENT)
Dept: BARIATRICS | Facility: CLINIC | Age: 64
End: 2024-02-21
Payer: COMMERCIAL

## 2024-02-21 VITALS
WEIGHT: 156 LBS | HEIGHT: 60 IN | HEART RATE: 89 BPM | BODY MASS INDEX: 30.63 KG/M2 | TEMPERATURE: 97 F | DIASTOLIC BLOOD PRESSURE: 80 MMHG | SYSTOLIC BLOOD PRESSURE: 122 MMHG

## 2024-02-21 DIAGNOSIS — E21.3 HYPERPARATHYROIDISM (HCC): ICD-10-CM

## 2024-02-21 DIAGNOSIS — E03.9 HYPOTHYROIDISM: ICD-10-CM

## 2024-02-21 DIAGNOSIS — K91.2 POSTSURGICAL MALABSORPTION: ICD-10-CM

## 2024-02-21 DIAGNOSIS — Z98.84 BARIATRIC SURGERY STATUS: ICD-10-CM

## 2024-02-21 DIAGNOSIS — Z48.815 ENCOUNTER FOR SURGICAL AFTERCARE FOLLOWING SURGERY OF DIGESTIVE SYSTEM: Primary | ICD-10-CM

## 2024-02-21 DIAGNOSIS — E66.9 OBESITY, CLASS I, BMI 30-34.9: ICD-10-CM

## 2024-02-21 DIAGNOSIS — K86.89 PANCREATIC INSUFFICIENCY: ICD-10-CM

## 2024-02-21 PROCEDURE — 99213 OFFICE O/P EST LOW 20 MIN: CPT | Performed by: PHYSICIAN ASSISTANT

## 2024-02-21 RX ORDER — FEXOFENADINE HCL 60 MG/1
100 TABLET, FILM COATED ORAL DAILY
COMMUNITY

## 2024-02-21 NOTE — PROGRESS NOTES
"Assessment/Plan:     Patient ID: Dayanara Trujillo is a 63 y.o. female.     Bariatric Surgery Status    -s/p Aelx-En-Y Gastric Bypass with Dr. Cheney in 2011 Presents to the office today for annual.  She continue to follow with our MWM program and is on wellbutrin and naltrexone - doing very well.     Pancreatic Insufficiency   Of note, pt presented last year with c/o \"dumping\" sxs almost daily. She was being followed by GI at [facility]. She states she also developed lactose intolerance and intolerance to many other foods- develops uncomfortable bloating feeling and diarrhea mostly with pastas, breads, dairy, veggies, etc. She underwent EGD 2022 with Dr Oh which showed unremarkable bypass anatomy. She reports since this time, GI told her pancreas was not producing enough elastase and was started on medication. She reports a significant improvement in her sxs with use of pancreatic medications and cutting out certain foods. Continue GI follow up      Hyperparathyroidism   - was seen by endocrinology - had her calcium supplements adjusted to calcium citrate with plan to repeat her labs. Continue vit D as well.     Continued/Maintain healthy weight loss with good nutrition intakes.  Adequate hydration with at least 64oz. fluid intake.  Follow diet as discussed.  Follow vitamin and mineral recommendations as reviewed with you.  Exercise as tolerated.    Colonoscopy referral made: utd  Mammo: utd     Follow-up in 1 year. We kindly ask that your arrive 15 minutes before your scheduled appointment time with your provider to allow our staff to room you, get your vital signs and update your chart.    Get lab work done . Please call the office if you need a script.  It is recommended to check with your insurance BEFORE getting labs done to make sure they are covered by your policy.      Call our office if you have any problems with abdominal pain especially associated with fever, chills, nausea, vomiting or any other " concerns.    All  Post-bariatric surgery patients should be aware that very small quantities of any alcohol can cause impairment and it is very possible not to feel the effect. The effect can be in the system for several hours.  It is also a stomach irritant.     It is advised to AVOID alcohol, Nonsteroidal antiinflammatory drugs (NSAIDS) and nicotine of all forms . Any of these can cause stomach irritation/pain.    Discussed the effects of alcohol on a bariatric patient and the increased impairment risk.     Keep up the good work!     Postsurgical Malabsorption   -At risk for malabsorption of vitamins/minerals secondary to malabsorption and restriction of intake from bariatric surgery  -Currently taking adequate postop bariatric surgery vitamin supplementation  -Next set of bariatric labs ordered for approximately 2 weeks  -Patient received education about the importance of adhering to a lifelong supplementation regimen to avoid vitamin/mineral deficiencies      Diagnoses and all orders for this visit:    Encounter for surgical aftercare following surgery of digestive system  -     Vitamin B1, whole blood; Future  -     Zinc; Future  -     Vitamin A; Future  -     Folate; Future    Bariatric surgery status  -     Vitamin B1, whole blood; Future  -     Zinc; Future  -     Vitamin A; Future  -     Folate; Future    Postsurgical malabsorption  -     Vitamin B1, whole blood; Future  -     Zinc; Future  -     Vitamin A; Future  -     Folate; Future    Obesity, Class I, BMI 30-34.9    Pancreatic insufficiency    Hyperparathyroidism (HCC)    Hypothyroidism    Other orders  -     fexofenadine (Allegra Allergy) 60 MG tablet; Take 100 mg by mouth daily         Subjective:      Patient ID: Dayanara Trujillo is a 63 y.o. female.    -s/p Alex-En-Y Gastric Bypass with Dr. Cheney in 2011 Presents to the office today for annual.  She continue to follow with our MWM program and is on wellbutrin and naltrexone - doing very well.      Initial:215  Current: 156  Giles: 150s  Current BMI is Body mass index is 30.47 kg/m².    Tolerating a regular diet-yes  Eating at least 60 grams of protein per day- working on improving   Drinking at least 64 ounces of fluid per day-yes  Sufficient exercise-yes  Using NSAIDs regularly-no  Using nicotine-no  Using alcohol-no  Supplements:   lincoln mvi + mag oxide + calcium citrate + vit D3    EWL is 36%    The following portions of the patient's history were reviewed and updated as appropriate: allergies, current medications, past family history, past medical history, past social history, past surgical history and problem list.    Review of Systems   Constitutional: Negative.    Respiratory: Negative.     Cardiovascular: Negative.    Gastrointestinal: Negative.    Neurological: Negative.    Psychiatric/Behavioral: Negative.           Objective:    /80   Pulse 89   Temp (!) 97 °F (36.1 °C) (Tympanic)   Ht 5' (1.524 m)   Wt 70.8 kg (156 lb)   BMI 30.47 kg/m²      Physical Exam  Vitals and nursing note reviewed.   Constitutional:       Appearance: Normal appearance. She is obese.   HENT:      Head: Normocephalic and atraumatic.   Eyes:      Extraocular Movements: Extraocular movements intact.      Pupils: Pupils are equal, round, and reactive to light.   Cardiovascular:      Rate and Rhythm: Normal rate.   Pulmonary:      Effort: Pulmonary effort is normal.   Musculoskeletal:         General: Normal range of motion.      Cervical back: Normal range of motion.   Skin:     General: Skin is warm and dry.   Neurological:      General: No focal deficit present.      Mental Status: She is alert and oriented to person, place, and time.   Psychiatric:         Mood and Affect: Mood normal.

## 2024-02-21 NOTE — PATIENT INSTRUCTIONS
Follow-up in 1 year. We kindly ask that your arrive 15 minutes before your scheduled appointment time with your provider to allow our staff to room you, get your vital signs and update your chart.    Get lab work done . Please call the office if you need a script.  It is recommended to check with your insurance BEFORE getting labs done to make sure they are covered by your policy.      Call our office if you have any problems with abdominal pain especially associated with fever, chills, nausea, vomiting or any other concerns.    All  Post-bariatric surgery patients should be aware that very small quantities of any alcohol can cause impairment and it is very possible not to feel the effect. The effect can be in the system for several hours.  It is also a stomach irritant.     It is advised to AVOID alcohol, Nonsteroidal antiinflammatory drugs (NSAIDS) and nicotine of all forms . Any of these can cause stomach irritation/pain.    Discussed the effects of alcohol on a bariatric patient and the increased impairment risk.     Keep up the good work!

## 2024-04-14 ENCOUNTER — APPOINTMENT (OUTPATIENT)
Dept: LAB | Age: 64
End: 2024-04-14
Payer: COMMERCIAL

## 2024-04-14 DIAGNOSIS — Z98.84 H/O BARIATRIC SURGERY: ICD-10-CM

## 2024-04-14 DIAGNOSIS — E53.8 B12 DEFICIENCY: ICD-10-CM

## 2024-04-14 DIAGNOSIS — E21.3 HYPERPARATHYROIDISM (HCC): ICD-10-CM

## 2024-04-14 DIAGNOSIS — Z48.815 ENCOUNTER FOR SURGICAL AFTERCARE FOLLOWING SURGERY OF DIGESTIVE SYSTEM: ICD-10-CM

## 2024-04-14 DIAGNOSIS — K91.2 POSTSURGICAL MALABSORPTION: ICD-10-CM

## 2024-04-14 DIAGNOSIS — Z98.84 BARIATRIC SURGERY STATUS: ICD-10-CM

## 2024-04-14 DIAGNOSIS — E61.1 IRON DEFICIENCY: ICD-10-CM

## 2024-04-14 LAB
25(OH)D3 SERPL-MCNC: 61.3 NG/ML (ref 30–100)
ALBUMIN SERPL BCP-MCNC: 3.8 G/DL (ref 3.5–5)
ALP SERPL-CCNC: 50 U/L (ref 34–104)
ALT SERPL W P-5'-P-CCNC: 19 U/L (ref 7–52)
ANION GAP SERPL CALCULATED.3IONS-SCNC: 7 MMOL/L (ref 4–13)
AST SERPL W P-5'-P-CCNC: 21 U/L (ref 13–39)
BILIRUB SERPL-MCNC: 0.37 MG/DL (ref 0.2–1)
BUN SERPL-MCNC: 15 MG/DL (ref 5–25)
CALCIUM SERPL-MCNC: 8.9 MG/DL (ref 8.4–10.2)
CHLORIDE SERPL-SCNC: 106 MMOL/L (ref 96–108)
CO2 SERPL-SCNC: 26 MMOL/L (ref 21–32)
CREAT SERPL-MCNC: 0.8 MG/DL (ref 0.6–1.3)
ERYTHROCYTE [DISTWIDTH] IN BLOOD BY AUTOMATED COUNT: 12.3 % (ref 11.6–15.1)
FERRITIN SERPL-MCNC: 5 NG/ML (ref 11–307)
GFR SERPL CREATININE-BSD FRML MDRD: 78 ML/MIN/1.73SQ M
GLUCOSE P FAST SERPL-MCNC: 89 MG/DL (ref 65–99)
HCT VFR BLD AUTO: 37.9 % (ref 34.8–46.1)
HGB BLD-MCNC: 11.9 G/DL (ref 11.5–15.4)
IRON SATN MFR SERPL: 10 % (ref 15–50)
IRON SERPL-MCNC: 42 UG/DL (ref 50–212)
MAGNESIUM SERPL-MCNC: 2 MG/DL (ref 1.9–2.7)
MCH RBC QN AUTO: 28.3 PG (ref 26.8–34.3)
MCHC RBC AUTO-ENTMCNC: 31.4 G/DL (ref 31.4–37.4)
MCV RBC AUTO: 90 FL (ref 82–98)
PHOSPHATE SERPL-MCNC: 3.4 MG/DL (ref 2.3–4.1)
PLATELET # BLD AUTO: 295 THOUSANDS/UL (ref 149–390)
PMV BLD AUTO: 11.1 FL (ref 8.9–12.7)
POTASSIUM SERPL-SCNC: 4.8 MMOL/L (ref 3.5–5.3)
PROT SERPL-MCNC: 6.9 G/DL (ref 6.4–8.4)
PTH-INTACT SERPL-MCNC: 78.8 PG/ML (ref 12–88)
RBC # BLD AUTO: 4.21 MILLION/UL (ref 3.81–5.12)
SODIUM SERPL-SCNC: 139 MMOL/L (ref 135–147)
TIBC SERPL-MCNC: 425 UG/DL (ref 250–450)
UIBC SERPL-MCNC: 383 UG/DL (ref 155–355)
VIT B12 SERPL-MCNC: 1407 PG/ML (ref 180–914)
WBC # BLD AUTO: 6.56 THOUSAND/UL (ref 4.31–10.16)

## 2024-04-14 PROCEDURE — 80053 COMPREHEN METABOLIC PANEL: CPT

## 2024-04-14 PROCEDURE — 84100 ASSAY OF PHOSPHORUS: CPT

## 2024-04-14 PROCEDURE — 83550 IRON BINDING TEST: CPT

## 2024-04-14 PROCEDURE — 83735 ASSAY OF MAGNESIUM: CPT

## 2024-04-14 PROCEDURE — 85027 COMPLETE CBC AUTOMATED: CPT

## 2024-04-14 PROCEDURE — 82306 VITAMIN D 25 HYDROXY: CPT

## 2024-04-14 PROCEDURE — 82728 ASSAY OF FERRITIN: CPT

## 2024-04-14 PROCEDURE — 83970 ASSAY OF PARATHORMONE: CPT

## 2024-04-14 PROCEDURE — 36415 COLL VENOUS BLD VENIPUNCTURE: CPT

## 2024-04-14 PROCEDURE — 82607 VITAMIN B-12: CPT

## 2024-04-14 PROCEDURE — 83540 ASSAY OF IRON: CPT

## 2024-04-15 ENCOUNTER — TELEPHONE (OUTPATIENT)
Dept: BARIATRICS | Facility: CLINIC | Age: 64
End: 2024-04-15

## 2024-04-15 DIAGNOSIS — Z98.84 BARIATRIC SURGERY STATUS: Primary | ICD-10-CM

## 2024-04-15 DIAGNOSIS — E61.1 IRON DEFICIENCY: ICD-10-CM

## 2024-04-15 NOTE — TELEPHONE ENCOUNTER
----- Message from DAYA Sahni sent at 4/15/2024 11:47 AM EDT -----  Carolynn's pt:    Please call pt to let her know we have received most of her labs. Vitamin B12 level is elevated but this is not harmful. If you are taking extra Vitamin B12 you can decrease this or stop this.     - Iron levels are low. I would recommend to start on an over the counter iron supplement called vitron C once daily for 2 weeks. If you are tolerating this well, then please increase to twice a day. Iron can be constipating, hence please start on stool softeners if needed to prevent this.     - repeat iron levels in 3 months.     - there are other labs that haven't resulted - folate, vitamin A, zinc and vitamin B1. Will let you know how those are once we get them.

## 2024-04-22 DIAGNOSIS — E66.9 OBESITY, CLASS I, BMI 30-34.9: ICD-10-CM

## 2024-04-22 DIAGNOSIS — R63.8 ABNORMAL CRAVING: ICD-10-CM

## 2024-04-22 RX ORDER — NALTREXONE HYDROCHLORIDE 50 MG/1
TABLET, FILM COATED ORAL
Qty: 90 TABLET | Refills: 0 | Status: SHIPPED | OUTPATIENT
Start: 2024-04-22

## 2024-04-26 DIAGNOSIS — E21.3 HYPERPARATHYROIDISM (HCC): ICD-10-CM

## 2024-04-26 RX ORDER — CALCIUM CARBONATE 600 MG
TABLET ORAL
Qty: 180 TABLET | Refills: 5 | Status: SHIPPED | OUTPATIENT
Start: 2024-04-26

## 2024-05-08 DIAGNOSIS — Z98.84 BARIATRIC SURGERY STATUS: ICD-10-CM

## 2024-05-08 DIAGNOSIS — E61.1 IRON DEFICIENCY: Primary | ICD-10-CM

## 2024-05-15 ENCOUNTER — TELEMEDICINE (OUTPATIENT)
Dept: HEMATOLOGY ONCOLOGY | Facility: CLINIC | Age: 64
End: 2024-05-15
Payer: COMMERCIAL

## 2024-05-15 DIAGNOSIS — Z98.84 BARIATRIC SURGERY STATUS: ICD-10-CM

## 2024-05-15 DIAGNOSIS — E61.1 IRON DEFICIENCY: ICD-10-CM

## 2024-05-15 PROCEDURE — 99242 OFF/OP CONSLTJ NEW/EST SF 20: CPT | Performed by: PHYSICIAN ASSISTANT

## 2024-05-15 NOTE — PROGRESS NOTES
Oncology Outpatient Virtual Consult Note  Dayanara Trujillo 64 y.o. female MRN: @ Encounter: [id number]        Date:  5/15/2024      Assessment/ Plan:      Chronic hypoferremia.  She status post gastric bypass surgery 4/18/2011.    May 14, 2023 hemoglobin 13.3,  iron saturation 28%, ferritin 31    4/14/24 hemoglobin 11.9, MCV 90.  Ferritin 5 April 14, 2024.  Ferritin was 22-31 in [id number].     She is intolerant to oral iron and it has been ineffective.    We discussed the possibility of IV iron.      Potential side effects of IV iron could include but may not be limited to:  change in taste, diarrhea, muscle cramps, nausea or vomiting, pain in the arms or legs, pain or burning sensation in the injection site, allergic reaction.  The patient verbalized understanding and wishes to proceed.    Venofer 300mg x 2 Doses requested.    Will see her prn.  Labs to be monitored per bariatrics.  If she were to become iron deficient again, we will see her back          HPI:  Dayanara Trujillo is a 64 y.o. seen for initial consultation 5/15/2024 at the referral of Nikki Gates PA-C regarding Iron deficiency anemia.     Past medical history of GERD, pancreatic insufficiency, hypothyroidism, hyperparathyroidism.  She status post gastric bypass surgery April 18, 2011    She is not vegetarian.    6/1/22 EGD-normal  7/18/22 colonoscopy-normal.  5-year recall requested    May 14, 2023 hemoglobin 13.3,  iron saturation 28%, ferritin 31    4/14/24 hemoglobin 11.9, MCV 90, white blood cell count 6.56, platelets 295.  B12 1400.  Ferritin 5, iron saturation 10%    She has taken oral iron-  unable to tolerate 2nd to GI distress.     She denies any melena, hematochezia, hematuria, epistaxis or other source of bleeding.    She has fatigue.      Chronic diarrhea since gastric bypass surgery.  3 years ago- GI.  Dx pancreatic insufficiency.  Replacement enzymes beneficial.      Endocrinologist for hypocalcemia.      Working with nutritionist.  Lost  about 20 lbs.          Test Results:      Labs:   Lab Results   Component Value Date    HGB 11.9 2024    HCT 37.9 2024    MCV 90 2024     2024    WBC 6.56 2024    NRBC 0 2021     Lab Results   Component Value Date     2015    K 4.8 2024     2024    CO2 26 2024    ANIONGAP 5 2015    BUN 15 2024    CREATININE 0.80 2024    GLUCOSE 92 2015    GLUF 89 2024    CALCIUM 8.9 2024    AST 21 2024    ALT 19 2024    ALKPHOS 50 2024    PROT 7.6 2015    BILITOT 0.36 2015    EGFR 78 2024           Imaging: .No results found.          Active Problems:   Patient Active Problem List   Diagnosis    Gastroesophageal reflux disease    H/O bariatric surgery    Hypothyroidism    Obesity, Class I, BMI 30-34.9    Pancreatic insufficiency    Hormone replacement therapy    Hyperparathyroidism (HCC)       Past Medical History:   Past Medical History:   Diagnosis Date    Cervical cancer (HCC)     GERD (gastroesophageal reflux disease)        Surgical History:   Past Surgical History:   Procedure Laterality Date    BREAST BIOPSY Right      SECTION  1986    COLONOSCOPY  2019    2nd procedure    EGD  2019    GASTRIC BYPASS  2011    HYSTERECTOMY      Age 48    NOSE SURGERY      removal of a growth in the right nostril    OOPHORECTOMY Bilateral     Age 48    OTHER SURGICAL HISTORY  2019    Cervical Intraepithelial Neioplasia Grage 2 Removal    URETHRAL SLING  2010       Family History:    Family History   Problem Relation Age of Onset    No Known Problems Mother     No Known Problems Father     No Known Problems Sister     No Known Problems Daughter     Breast cancer Maternal Grandmother         unknown age    No Known Problems Maternal Grandfather     No Known Problems Paternal Grandmother     No Known Problems Paternal Grandfather     No Known Problems  Maternal Aunt     No Known Problems Maternal Aunt     No Known Problems Maternal Aunt     No Known Problems Maternal Aunt     No Known Problems Maternal Aunt     No Known Problems Maternal Aunt     No Known Problems Maternal Aunt     No Known Problems Maternal Aunt     No Known Problems Maternal Aunt     No Known Problems Paternal Aunt     No Known Problems Paternal Aunt     No Known Problems Paternal Aunt     No Known Problems Paternal Aunt     No Known Problems Paternal Aunt     No Known Problems Paternal Aunt     Thyroid disease Family     Breast cancer Cousin 47       Cancer-related family history includes Breast cancer in her maternal grandmother; Breast cancer (age of onset: 47) in her cousin.    Social History:   Social History     Socioeconomic History    Marital status: /Civil Union     Spouse name: Not on file    Number of children: Not on file    Years of education: Not on file    Highest education level: Not on file   Occupational History    Not on file   Tobacco Use    Smoking status: Never     Passive exposure: Never    Smokeless tobacco: Never   Vaping Use    Vaping status: Never Used   Substance and Sexual Activity    Alcohol use: Not Currently     Alcohol/week: 1.0 standard drink of alcohol     Types: 1 Shots of liquor per week     Comment: occ    Drug use: Never    Sexual activity: Not on file   Other Topics Concern    Not on file   Social History Narrative    Not on file     Social Determinants of Health     Financial Resource Strain: Not on file   Food Insecurity: Not on file   Transportation Needs: Not on file   Physical Activity: Not on file   Stress: Not on file   Social Connections: Not on file   Intimate Partner Violence: Not on file   Housing Stability: Not on file       Current Medications:   Current Outpatient Medications   Medication Sig Dispense Refill    buPROPion (WELLBUTRIN) 100 mg tablet Take 1 tablet (100 mg total) by mouth 3 (three) times a day 270 tablet 0     "cholecalciferol (VITAMIN D3) 1,000 units tablet Take 2 tablets by mouth daily      clobetasol (TEMOVATE) 0.05 % cream APPLY TWICE DAILY TO INSECT BITES FOR UP TO 2 WEEKS/MONTH AS NEEDED. DO NOT APPLY TO FACE. (Patient not taking: Reported on 2/21/2024)      [facility] Calcium 600 MG tablet 2 tabs three times daily with meals 180 tablet 5    Estradiol 6 MG PLLT 6 mg every 4 (four) months      fexofenadine (Allegra Allergy) 60 MG tablet Take 100 mg by mouth daily      levothyroxine 112 mcg tablet Take 112 mcg by mouth daily      loratadine (CLARITIN) 10 mg tablet Take 10 mg by mouth daily (Patient not taking: Reported on 1/31/2024)      magnesium oxide (MAG-OX) 400 mg tablet Take 400 mg by mouth daily at bedtime      Multiple Vitamins-Minerals (BARIATRIC FUSION PO) Take 1 tablet by mouth daily      naltrexone (REVIA) 50 mg tablet Take 1/2 tablet 2 times a day 90 tablet 0    naltrexone (REVIA) 50 mg tablet Take 1/2 tablet 2 times a day 90 tablet 0    pantoprazole (PROTONIX) 40 mg tablet Take 40 mg by mouth daily      Sod Fluoride-Potassium Nitrate 1.1-5 % PSTE BRUSH FOR 2 MINS BEFORE BEDTIME. SPIT OUT BUT DO NOT RINSE. NO EATING OR DRINKNG FOR 30 MINUTES.      Testosterone 50 MG PLLT 50 mg every 4 (four) months      Zenpep [id number] units CPEP Take 5 capsules by mouth daily Takes 5-7 caps/day       No current facility-administered medications for this visit.       Allergies:   Allergies   Allergen Reactions    Latex Rash       Review of Systems   Constitutional:  Positive for fatigue.          Physical Exam:    There is no height or weight on file to calculate BSA.   Ht Readings from Last 3 Encounters:   02/21/24 5' (1.524 m)   01/31/24 5' 1\" (1.549 m)   01/18/24 5' (1.524 m)       Wt Readings from Last 3 Encounters:   02/21/24 70.8 kg (156 lb)   01/31/24 70.4 kg (155 lb 3.2 oz)   01/18/24 70.8 kg (156 lb)        Temp Readings from Last 3 Encounters:   02/21/24 (!) 97 °F (36.1 °C) (Tympanic)   11/15/22 (!) 97 °F (36.1 °C) "   09/09/22 97.5 °F (36.4 °C)        BP Readings from Last 3 Encounters:   02/21/24 122/80   01/31/24 126/82   01/18/24 128/84         Pulse Readings from Last 3 Encounters:   02/21/24 89   01/31/24 93   01/18/24 77          Physical Exam    Physical Exam  Constitutional:       Appearance: She is well-developed.   HENT:      Head: Normocephalic and atraumatic.   Pulmonary:      Effort: Pulmonary effort is normal. No respiratory distress.   Neurological:      Mental Status: She is alert.   Psychiatric:         Behavior: Behavior normal.             Emergency Contacts:    Extended Emergency Contact Information  Primary Emergency Contact: Lacey Morris  Home Phone: [phone]  Mobile Phone: [phone]  Relation: Sister  Secondary Emergency Contact: joanmarlin  Address: [address] [place] [address]           [facility] [place] [address] [facility]  Home Phone: [phone]  Mobile Phone: [phone]  Relation: Spouse     The patient was identified by name and date of birth. Dayanara Trujillo was informed that this is a telemedicine visit and that the visit is being conducted through the Epic Embedded platform. She agrees to proceed..  My office door was closed. No one else was in the room.  She acknowledged consent and understanding of privacy and security of the video platform. The patient has agreed to participate and understands they can discontinue the visit at any time.    Patient is aware this is a billable service.     I invested 30 minutes reviewing the patient's medical history and discussing the care plan directly with the patient     Verification of patient location:  Patient is located in [place] where I have an active license.

## 2024-05-20 ENCOUNTER — OFFICE VISIT (OUTPATIENT)
Dept: BARIATRICS | Facility: CLINIC | Age: 64
End: 2024-05-20
Payer: COMMERCIAL

## 2024-05-20 VITALS
DIASTOLIC BLOOD PRESSURE: 80 MMHG | BODY MASS INDEX: 29.76 KG/M2 | SYSTOLIC BLOOD PRESSURE: 132 MMHG | HEIGHT: 60 IN | WEIGHT: 151.6 LBS | TEMPERATURE: 96.9 F | HEART RATE: 78 BPM | RESPIRATION RATE: 16 BRPM

## 2024-05-20 DIAGNOSIS — K21.9 GASTROESOPHAGEAL REFLUX DISEASE: ICD-10-CM

## 2024-05-20 DIAGNOSIS — E61.1 IRON DEFICIENCY: ICD-10-CM

## 2024-05-20 DIAGNOSIS — E66.9 OBESITY, CLASS I, BMI 30-34.9: ICD-10-CM

## 2024-05-20 DIAGNOSIS — R63.8 ABNORMAL CRAVING: ICD-10-CM

## 2024-05-20 DIAGNOSIS — E66.3 OVERWEIGHT: Primary | ICD-10-CM

## 2024-05-20 PROCEDURE — 99214 OFFICE O/P EST MOD 30 MIN: CPT | Performed by: PHYSICIAN ASSISTANT

## 2024-05-20 RX ORDER — BUPROPION HYDROCHLORIDE 100 MG/1
100 TABLET ORAL 3 TIMES DAILY
Qty: 90 TABLET | Refills: 3 | Status: SHIPPED | OUTPATIENT
Start: 2024-05-20

## 2024-05-20 RX ORDER — NALTREXONE HYDROCHLORIDE 50 MG/1
TABLET, FILM COATED ORAL
Qty: 90 TABLET | Refills: 1 | Status: SHIPPED | OUTPATIENT
Start: 2024-05-20

## 2024-05-20 RX ORDER — BUPROPION HYDROCHLORIDE 100 MG/1
100 TABLET ORAL 3 TIMES DAILY
Qty: 270 TABLET | Refills: 1 | Status: SHIPPED | OUTPATIENT
Start: 2024-05-20

## 2024-05-20 NOTE — ASSESSMENT & PLAN NOTE
-Patient is pursuing conservative program  -Initial weight loss goal of 5-10% weight loss for improved health  -Screening labs 3/21/22    Initial:172.9  Last Visit: 156  Current:151  Change:-20.9lb (change from last OV -4lb )    Goal:  -discussed food tracking-1200 calorie goal and 60gm protein min  -Continue to go to the gym 4 times a week  -Continue to drink at least 60 oz water daily. Discussed adding on clear protein shake like seeq       To continue on naltrexone and buproprion. To do 1/2 tablet of naltrexone in AM and PM.  To switch to bupropion 100mg TID as she does not feel the SR release is working consistently. Denies any side effects    She was on topamax prior but did not lose significant weight. Would avoid GLP1 RA due to pancreatic insufficiency

## 2024-05-20 NOTE — PATIENT INSTRUCTIONS
.iIron Rich Diet   WHAT YOU NEED TO KNOW:   An iron-rich diet includes foods that are good sources of iron. People need extra iron during childhood, adolescence (teenage years), and pregnancy. Iron is a mineral that your body needs to make hemoglobin. Hemoglobin is part of your blood and helps carry oxygen from your lungs to the rest of your body. Eat iron-rich foods and vitamin C every day to prevent iron deficiency anemia. Iron deficiency anemia can lead to other health problems in adults and growth or development problems in children.  DISCHARGE INSTRUCTIONS:   Daily iron needs:   Males:      1 to 3 years old: 7 mg    4 to 8 years old: 10 mg    9 to 13 years old: 8 mg    14 to 18 years old: 11 mg    19 years and older: 8 mg    Females:      1 to 3 years old: 7 mg    4 to 8 years old: 10 mg    9 to 13 years old: 8 mg    14 to 18 years old: 15 mg    19 to 50 years: 18 mg    Over 51 years old: 8 mg    Pregnant women:  27 mg    Foods that contain iron:   Meat, fish, and poultry are good sources of iron. They contain heme iron, a form of iron that your body absorbs very well. Fruit, vegetables, eggs, and grains such as pasta, rice, and cereal also contain iron. They contain nonheme iron, a form of iron that is not absorbed as well as heme iron. You can absorb more iron from these foods by eating a food that is high in vitamin C at the same time. You can also absorb more nonheme iron by eating a food from the meat, fish, and poultry group at the same time.    Fish and shellfish contain some mercury, a metal that can be harmful to the body. Children and unborn babies are at higher risk for harm caused by mercury. Children and pregnant women should avoid eating fish high in mercury, such as shark and swordfish. They should also eat only fish that are lower in mercury, such as salmon, canned light tuna, and catfish. Limit the amount of low-mercury fish and shellfish you eat to less than 12 ounces per week.    Iron-rich  foods:   Foods that contain 2 mg or more per serving:      3 ounces of cooked beef (jose manuel, eye of round) or cooked turkey (dark meat)    ½ cup of beans (black, kidney, or lentil, or soybeans)    ½ cup of tofu    1 medium baked potato    1 cup of cooked artichoke or cooked spinach    ¾ cup of instant oatmeal    1 cup of corn flakes    Foods that contain 1 to 2 mg per serving:      3 ounces of chicken    3 ounces of pork    3 ounces of turkey (light meat)    3 ounces of light tuna    ½ cup of seedless, packed raisins    1 slice of whole-wheat or white bread       Good sources of vitamin C:  Eat a serving of vitamin C with any iron-rich food to help your body absorb more iron. The following fruits and vegetables are good sources of vitamin C:  1 cup of fresh orange juice (124 mg) or pink grapefruit juice (83 mg)    1 cup of strawberries (106 mg)    1 cup of diced cantaloupe (68 mg)    1 cup of sweet yellow pepper (283 mg)    1 cup of fresh, boiled broccoli (116 mg) or cooked brussels sprouts (97 mg)    1 cup of kale (53 mg)    1 cup of tomato juice (45 mg)       Other guidelines to follow:   Tea and coffee can decrease the amount of iron that your body absorbs from iron-rich foods. Drink coffee and tea separately from meals that contain iron-rich foods.    Have foods and liquids high in calcium separately from iron-rich foods. Calcium prevents iron from being absorbed. Cow's milk and products made from it, such as cheese and yogurt, are high in calcium. Children older than 1 year only need about 24 ounces of cow's milk each day. Other foods high in calcium include leafy greens, green vegetables, almonds, and canned sardines.       © Copyright [employer] 2023 Information is for End User's use only and may not be sold, redistributed or otherwise used for commercial purposes.  The above information is an  only. It is not intended as medical advice for individual conditions or treatments. Talk to your doctor,  nurse or pharmacist before following any medical regimen to see if it is safe and effective for you.

## 2024-05-20 NOTE — PROGRESS NOTES
Assessment/Plan:    Overweight  -Patient is pursuing conservative program  -Initial weight loss goal of 5-10% weight loss for improved health  -Screening labs 3/21/22    Initial:172.9  Last Visit: 156  Current:151  Change:-20.9lb (change from last OV -4lb )    Goal:  -discussed food tracking-1200 calorie goal and 60gm protein min  -Continue to go to the gym 4 times a week  -Continue to drink at least 60 oz water daily. Discussed adding on clear protein shake like seeq       To continue on naltrexone and buproprion. To do 1/2 tablet of naltrexone in AM and PM.  To switch to bupropion 100mg TID as she does not feel the SR release is working consistently. Denies any side effects    She was on topamax prior but did not lose significant weight. Would avoid GLP1 RA due to pancreatic insufficiency    Iron deficiency  Discussed iron rich foods and will be getting venofer.     Gastroesophageal reflux disease  On pantoprazole        Follow up in approximately  4 months  with Non-Surgical Physician/Advanced Practitioner.     Diagnoses and all orders for this visit:    Overweight    Obesity, Class I, BMI 30-34.9  -     buPROPion (WELLBUTRIN) 100 mg tablet; Take 1 tablet (100 mg total) by mouth 3 (three) times a day  -     naltrexone (REVIA) 50 mg tablet; Take 1/2 tablet 2 times a day    Abnormal craving  -     naltrexone (REVIA) 50 mg tablet; Take 1/2 tablet 2 times a day    Iron deficiency    Gastroesophageal reflux disease    Other orders  -     Calcium Carbonate 1500 (600 Ca) MG TABS; 2 TABS THREE TIMES DAILY WITH MEALS          Subjective:   Chief Complaint   Patient presents with    Follow-up     MWM- 4mth F/u        Patient ID: Dayanara Trujillo  is a 64 y.o. female with excess weight/obesity here to pursue weight managment.  Patient is pursuing Conservative Program.     HPI  She has decreased portion size and changed what she is snacking on . Typically doing 2 lite meals and a larger dinner. She does sometimes feel hunger at  night and thinks it may be due to sleep pattern.      Wt Readings from Last 10 Encounters:   24 68.8 kg (151 lb 9.6 oz)   24 70.8 kg (156 lb)   24 70.4 kg (155 lb 3.2 oz)   24 70.8 kg (156 lb)   23 74 kg (163 lb 3.2 oz)   23 72.6 kg (160 lb)   23 75 kg (165 lb 6.4 oz)   23 75.4 kg (166 lb 3.2 oz)   23 75.3 kg (166 lb)   23 76.1 kg (167 lb 12.8 oz)       Food logging:  Increased appetite/cravings:  Exercise:cardio, weights. More active outside  Hydration:water (Flavored), coffee    Diet Recall:   B:1 piece bread, 4 piecees of cheese , grapes and coffee  L:leftovers or 1/2 sandwich. yogurt  D:meat , vegetable and carb (sweet potato)    Snack: 1 biscotti, 1-2 candy a day, popcorn    The following portions of the patient's history were reviewed and updated as appropriate: She  has a past medical history of Cervical cancer (HCC) and GERD (gastroesophageal reflux disease).  She   Patient Active Problem List    Diagnosis Date Noted    Iron deficiency 05/15/2024    Hyperparathyroidism (HCC) 2024    Pancreatic insufficiency 2023    Hormone replacement therapy 2023    Overweight 2022    Gastroesophageal reflux disease 2022    Bariatric surgery status 2022    Hypothyroidism 2022     She  has a past surgical history that includes Hysterectomy; Oophorectomy (Bilateral); Breast biopsy (Right);  section (1986); Urethral sling (2010); Gastric bypass (2011); Nose surgery; Other surgical history (2019); Colonoscopy (2019); and EGD (2019).  Her family history includes Breast cancer in her maternal grandmother; Breast cancer (age of onset: 47) in her cousin; No Known Problems in her daughter, father, maternal aunt, maternal aunt, maternal aunt, maternal aunt, maternal aunt, maternal aunt, maternal aunt, maternal aunt, maternal aunt, maternal grandfather, mother, paternal aunt, paternal aunt,  paternal aunt, paternal aunt, paternal aunt, paternal aunt, paternal grandfather, paternal grandmother, and sister; Thyroid disease in her family.  She  reports that she has never smoked. She has never been exposed to tobacco smoke. She has never used smokeless tobacco. She reports that she does not currently use alcohol after a past usage of about 1.0 standard drink of alcohol per week. She reports that she does not use drugs.  Current Outpatient Medications   Medication Sig Dispense Refill    buPROPion (WELLBUTRIN) 100 mg tablet Take 1 tablet (100 mg total) by mouth 3 (three) times a day 270 tablet 1    Calcium Carbonate 1500 (600 Ca) MG TABS 2 TABS THREE TIMES DAILY WITH MEALS      cholecalciferol (VITAMIN D3) 1,000 units tablet Take 2 tablets by mouth daily      clobetasol (TEMOVATE) 0.05 % cream as needed      [facility] Calcium 600 MG tablet 2 tabs three times daily with meals 180 tablet 5    Estradiol 6 MG PLLT 6 mg every 4 (four) months      levothyroxine 112 mcg tablet Take 112 mcg by mouth daily      loratadine (CLARITIN) 10 mg tablet Take 10 mg by mouth daily      magnesium oxide (MAG-OX) 400 mg tablet Take 400 mg by mouth daily at bedtime      Multiple Vitamins-Minerals (BARIATRIC FUSION PO) Take 1 tablet by mouth daily      naltrexone (REVIA) 50 mg tablet Take 1/2 tablet 2 times a day 90 tablet 0    naltrexone (REVIA) 50 mg tablet Take 1/2 tablet 2 times a day 90 tablet 1    pantoprazole (PROTONIX) 40 mg tablet Take 40 mg by mouth daily      Testosterone 50 MG PLLT 50 mg every 4 (four) months      Zenpep [id number] units CPEP Take 5 capsules by mouth daily Takes 5-7 caps/day      buPROPion (WELLBUTRIN) 100 mg tablet TAKE 1 TABLET BY MOUTH 3 TIMES A DAY. 90 tablet 3    fexofenadine (Allegra Allergy) 60 MG tablet Take 100 mg by mouth daily (Patient not taking: Reported on 5/20/2024)       No current facility-administered medications for this visit.     Current Outpatient Medications on File Prior to Visit    Medication Sig    Calcium Carbonate 1500 (600 Ca) MG TABS 2 TABS THREE TIMES DAILY WITH MEALS    cholecalciferol (VITAMIN D3) 1,000 units tablet Take 2 tablets by mouth daily    clobetasol (TEMOVATE) 0.05 % cream as needed    [facility] Calcium 600 MG tablet 2 tabs three times daily with meals    Estradiol 6 MG PLLT 6 mg every 4 (four) months    levothyroxine 112 mcg tablet Take 112 mcg by mouth daily    loratadine (CLARITIN) 10 mg tablet Take 10 mg by mouth daily    magnesium oxide (MAG-OX) 400 mg tablet Take 400 mg by mouth daily at bedtime    Multiple Vitamins-Minerals (BARIATRIC FUSION PO) Take 1 tablet by mouth daily    naltrexone (REVIA) 50 mg tablet Take 1/2 tablet 2 times a day    pantoprazole (PROTONIX) 40 mg tablet Take 40 mg by mouth daily    Testosterone 50 MG PLLT 50 mg every 4 (four) months    Zenpep [id number] units CPEP Take 5 capsules by mouth daily Takes 5-7 caps/day    [DISCONTINUED] buPROPion (WELLBUTRIN) 100 mg tablet Take 1 tablet (100 mg total) by mouth 3 (three) times a day    fexofenadine (Allegra Allergy) 60 MG tablet Take 100 mg by mouth daily (Patient not taking: Reported on 5/20/2024)    [DISCONTINUED] naltrexone (REVIA) 50 mg tablet Take 1/2 tablet 2 times a day (Patient not taking: Reported on 5/20/2024)    [DISCONTINUED] Sod Fluoride-Potassium Nitrate 1.1-5 % PSTE BRUSH FOR 2 MINS BEFORE BEDTIME. SPIT OUT BUT DO NOT RINSE. NO EATING OR DRINKNG FOR 30 MINUTES.     No current facility-administered medications on file prior to visit.     She is allergic to latex..    Review of Systems   Constitutional:  Negative for fever.   Respiratory:  Negative for shortness of breath.    Cardiovascular:  Negative for chest pain and palpitations.   Gastrointestinal:  Negative for abdominal pain, constipation, diarrhea and vomiting.   Genitourinary:  Negative for difficulty urinating.   Skin:  Negative for rash.   Neurological:  Negative for headaches.   Psychiatric/Behavioral:  Negative for dysphoric  mood. The patient is not nervous/anxious.        Objective:    /80   Pulse 78   Temp (!) 96.9 °F (36.1 °C)   Resp 16   Ht 5' (1.524 m)   Wt 68.8 kg (151 lb 9.6 oz)   BMI 29.61 kg/m²      Physical Exam  Vitals and nursing note reviewed.   Constitutional:       General: She is not in acute distress.     Appearance: She is well-developed.   HENT:      Head: Normocephalic and atraumatic.   Eyes:      Conjunctiva/sclera: Conjunctivae normal.   Neck:      Thyroid: No thyromegaly.   Pulmonary:      Effort: Pulmonary effort is normal. No respiratory distress.   Skin:     Findings: No rash (visible).   Neurological:      Mental Status: She is alert and oriented to person, place, and time.   Psychiatric:         Mood and Affect: Mood normal.         Behavior: Behavior normal.

## 2024-05-22 ENCOUNTER — TELEPHONE (OUTPATIENT)
Dept: HEMATOLOGY ONCOLOGY | Facility: CLINIC | Age: 64
End: 2024-05-22

## 2024-05-23 DIAGNOSIS — E61.1 IRON DEFICIENCY: Primary | ICD-10-CM

## 2024-05-23 RX ORDER — SODIUM CHLORIDE 9 MG/ML
20 INJECTION, SOLUTION INTRAVENOUS ONCE
Status: CANCELLED | OUTPATIENT
Start: 2024-06-03

## 2024-05-24 DIAGNOSIS — Z00.6 ENCOUNTER FOR EXAMINATION FOR NORMAL COMPARISON OR CONTROL IN CLINICAL RESEARCH PROGRAM: ICD-10-CM

## 2024-05-30 DIAGNOSIS — E61.1 IRON DEFICIENCY: Primary | ICD-10-CM

## 2024-05-30 RX ORDER — SODIUM CHLORIDE 9 MG/ML
20 INJECTION, SOLUTION INTRAVENOUS ONCE
Status: CANCELLED | OUTPATIENT
Start: 2024-06-03

## 2024-05-31 ENCOUNTER — TELEPHONE (OUTPATIENT)
Dept: INFUSION CENTER | Facility: HOSPITAL | Age: 64
End: 2024-05-31

## 2024-06-03 ENCOUNTER — HOSPITAL ENCOUNTER (OUTPATIENT)
Dept: INFUSION CENTER | Facility: HOSPITAL | Age: 64
Discharge: HOME | End: 2024-06-03
Attending: INTERNAL MEDICINE
Payer: COMMERCIAL

## 2024-06-03 VITALS
TEMPERATURE: 97.7 F | SYSTOLIC BLOOD PRESSURE: 133 MMHG | RESPIRATION RATE: 16 BRPM | DIASTOLIC BLOOD PRESSURE: 76 MMHG | HEART RATE: 85 BPM

## 2024-06-03 DIAGNOSIS — E61.1 IRON DEFICIENCY: Primary | ICD-10-CM

## 2024-06-03 RX ORDER — SODIUM CHLORIDE 9 MG/ML
20 INJECTION, SOLUTION INTRAVENOUS ONCE
OUTPATIENT
Start: 2024-06-11

## 2024-06-03 RX ORDER — SODIUM CHLORIDE 9 MG/ML
20 INJECTION, SOLUTION INTRAVENOUS ONCE
Status: COMPLETED | OUTPATIENT
Start: 2024-06-03 | End: 2024-06-03

## 2024-06-03 RX ADMIN — IRON SUCROSE 300 MG: 20 INJECTION, SOLUTION INTRAVENOUS at 13:53

## 2024-06-03 RX ADMIN — SODIUM CHLORIDE 20 ML/HR: 0.9 INJECTION, SOLUTION INTRAVENOUS at 13:51

## 2024-06-03 NOTE — PROGRESS NOTES
Dayanara Trujillo  tolerated treatment well with no complications.      Dayanara Trujillo is aware of future appt on 6/11/24 at 1400.     AVS Declined by Dayanara Trujillo

## 2024-06-03 NOTE — PLAN OF CARE
Problem: Potential for Falls  Goal: Patient will remain free of falls  Description: INTERVENTIONS:  - Educate patient/family on patient safety including physical limitations  - Instruct patient to call for assistance with activity   - Consult OT/PT to assist with strengthening/mobility   - Keep Call bell within reach  - Keep bed low and locked with side rails adjusted as appropriate  - Keep care items and personal belongings within reach  - Consider moving patient to room near nurses station  Outcome: Progressing

## 2024-06-11 ENCOUNTER — HOSPITAL ENCOUNTER (OUTPATIENT)
Dept: INFUSION CENTER | Facility: HOSPITAL | Age: 64
Discharge: HOME | End: 2024-06-11
Attending: INTERNAL MEDICINE
Payer: COMMERCIAL

## 2024-06-11 DIAGNOSIS — E61.1 IRON DEFICIENCY: Primary | ICD-10-CM

## 2024-06-11 PROCEDURE — 96366 THER/PROPH/DIAG IV INF ADDON: CPT

## 2024-06-11 PROCEDURE — 96365 THER/PROPH/DIAG IV INF INIT: CPT

## 2024-06-11 RX ORDER — SODIUM CHLORIDE 9 MG/ML
20 INJECTION, SOLUTION INTRAVENOUS ONCE
Status: CANCELLED | OUTPATIENT
Start: 2024-06-17

## 2024-06-11 RX ORDER — SODIUM CHLORIDE 9 MG/ML
20 INJECTION, SOLUTION INTRAVENOUS ONCE
Status: COMPLETED | OUTPATIENT
Start: 2024-06-11 | End: 2024-06-11

## 2024-06-11 RX ADMIN — SODIUM CHLORIDE 20 ML/HR: 0.9 INJECTION, SOLUTION INTRAVENOUS at 14:17

## 2024-06-11 RX ADMIN — IRON SUCROSE 300 MG: 20 INJECTION, SOLUTION INTRAVENOUS at 14:17

## 2024-06-11 NOTE — PROGRESS NOTES
Dayanara Trujillo  tolerated treatment well with no complications.      No future appointments in infusion.      AVS printed and given to Dayanara Trujillo:  No (Declined by Dayanara Trujillo)

## 2024-07-14 ENCOUNTER — APPOINTMENT (OUTPATIENT)
Dept: LAB | Age: 64
End: 2024-07-14
Payer: COMMERCIAL

## 2024-07-14 DIAGNOSIS — Z98.84 BARIATRIC SURGERY STATUS: ICD-10-CM

## 2024-07-14 DIAGNOSIS — Z00.6 ENCOUNTER FOR EXAMINATION FOR NORMAL COMPARISON OR CONTROL IN CLINICAL RESEARCH PROGRAM: ICD-10-CM

## 2024-07-14 DIAGNOSIS — E61.1 IRON DEFICIENCY: ICD-10-CM

## 2024-07-14 LAB
ERYTHROCYTE [DISTWIDTH] IN BLOOD BY AUTOMATED COUNT: 15.3 % (ref 11.6–15.1)
FOLATE SERPL-MCNC: >22.3 NG/ML
HCT VFR BLD AUTO: 42.1 % (ref 34.8–46.1)
HGB BLD-MCNC: 13.7 G/DL (ref 11.5–15.4)
IRON SATN MFR SERPL: 42 % (ref 15–50)
IRON SERPL-MCNC: 144 UG/DL (ref 50–212)
MCH RBC QN AUTO: 29.7 PG (ref 26.8–34.3)
MCHC RBC AUTO-ENTMCNC: 32.5 G/DL (ref 31.4–37.4)
MCV RBC AUTO: 91 FL (ref 82–98)
PLATELET # BLD AUTO: 252 THOUSANDS/UL (ref 149–390)
PMV BLD AUTO: 10.4 FL (ref 8.9–12.7)
RBC # BLD AUTO: 4.62 MILLION/UL (ref 3.81–5.12)
TIBC SERPL-MCNC: 339 UG/DL (ref 250–450)
UIBC SERPL-MCNC: 195 UG/DL (ref 155–355)
WBC # BLD AUTO: 5.94 THOUSAND/UL (ref 4.31–10.16)

## 2024-07-14 PROCEDURE — 83540 ASSAY OF IRON: CPT

## 2024-07-14 PROCEDURE — 85027 COMPLETE CBC AUTOMATED: CPT

## 2024-07-14 PROCEDURE — 83550 IRON BINDING TEST: CPT

## 2024-07-17 LAB — ZINC SERPL-MCNC: 83 UG/DL (ref 44–115)

## 2024-07-18 LAB — VIT A SERPL-MCNC: 35.1 UG/DL (ref 22–69.5)

## 2024-07-19 LAB — VIT B1 BLD-SCNC: 196.6 NMOL/L (ref 66.5–200)

## 2024-07-23 ENCOUNTER — HOSPITAL ENCOUNTER (OUTPATIENT)
Dept: RADIOLOGY | Facility: HOSPITAL | Age: 64
Discharge: HOME | End: 2024-07-23
Payer: COMMERCIAL

## 2024-07-23 DIAGNOSIS — M25.512 LEFT SHOULDER PAIN, UNSPECIFIED CHRONICITY: ICD-10-CM

## 2024-07-23 PROCEDURE — 73030 X-RAY EXAM OF SHOULDER: CPT

## 2024-07-25 ENCOUNTER — OFFICE VISIT (OUTPATIENT)
Dept: ENDOCRINOLOGY | Facility: CLINIC | Age: 64
End: 2024-07-25
Payer: COMMERCIAL

## 2024-07-25 VITALS
HEIGHT: 60 IN | OXYGEN SATURATION: 98 % | HEART RATE: 75 BPM | WEIGHT: 148.8 LBS | SYSTOLIC BLOOD PRESSURE: 126 MMHG | DIASTOLIC BLOOD PRESSURE: 82 MMHG | BODY MASS INDEX: 29.21 KG/M2

## 2024-07-25 DIAGNOSIS — R79.89 ELEVATED PARATHYROID HORMONE: Primary | ICD-10-CM

## 2024-07-25 DIAGNOSIS — E21.3 HYPERPARATHYROIDISM (HCC): ICD-10-CM

## 2024-07-25 DIAGNOSIS — E03.9 HYPOTHYROIDISM, UNSPECIFIED TYPE: ICD-10-CM

## 2024-07-25 LAB
APOB+LDLR+PCSK9 GENE MUT ANL BLD/T: NOT DETECTED
BRCA1+BRCA2 DEL+DUP + FULL MUT ANL BLD/T: NOT DETECTED
MLH1+MSH2+MSH6+PMS2 GN DEL+DUP+FUL M: NOT DETECTED

## 2024-07-25 PROCEDURE — 99214 OFFICE O/P EST MOD 30 MIN: CPT | Performed by: PHYSICIAN ASSISTANT

## 2024-07-25 NOTE — PROGRESS NOTES
Established Patient Progress Note     CC: Endocrinology follow up visit    Impression & Plan:    Problem List Items Addressed This Visit        Endocrine    Hypothyroidism     TSH normal, continue levothyroxine.          Relevant Orders    TSH, 3rd generation    T4, free    Hyperparathyroidism (HCC)     PTH improved- this was likely secondary to malabsorption due to hx gastric bypass  This has improved on Increased Calcium supplement.          Relevant Orders    DXA bone density spine hip and pelvis    Vitamin D 25 hydroxy    PTH, intact    Comprehensive metabolic panel   Other Visit Diagnoses     Elevated parathyroid hormone    -  Primary    Relevant Orders    DXA bone density spine hip and pelvis            History of Present Illness:     Dayanara Trujillo is a 64 y.o. female with a history of hyperparathyroidism secondary to gastric bypass surgery.   She has been having problems with shoulder pain and is doing PT and will be seeing ortho.     Since last visit she has been Taking Calcium 600mg-- 2 tabs TID and also takes Vitamin D 2000 units daily.  She has history of osteopenia on DEXA scan in .     For hypothroidism, she is taking levothyroxine    She is following with bariatrics as well for iron deficiency and has been feeling improvement in energy after Iron Infusions.         Patient Active Problem List   Diagnosis   • Gastroesophageal reflux disease   • Bariatric surgery status   • Hypothyroidism   • Overweight   • Pancreatic insufficiency   • Hormone replacement therapy   • Hyperparathyroidism (HCC)   • Iron deficiency      Past Medical History:   Diagnosis Date   • Cervical cancer (HCC)    • GERD (gastroesophageal reflux disease)       Past Surgical History:   Procedure Laterality Date   • BREAST BIOPSY Right    •  SECTION  1986   • COLONOSCOPY  2019    2nd procedure   • EGD  2019   • GASTRIC BYPASS  2011   • HYSTERECTOMY      Age 48   • NOSE SURGERY      removal of a  growth in the right nostril   • OOPHORECTOMY Bilateral     Age 48   • OTHER SURGICAL HISTORY  04/23/2019    Cervical Intraepithelial Neioplasia Grage 2 Removal   • URETHRAL SLING  02/12/2010      Family History   Problem Relation Age of Onset   • No Known Problems Mother    • No Known Problems Father    • No Known Problems Sister    • No Known Problems Daughter    • Breast cancer Maternal Grandmother         unknown age   • No Known Problems Maternal Grandfather    • No Known Problems Paternal Grandmother    • No Known Problems Paternal Grandfather    • No Known Problems Maternal Aunt    • No Known Problems Maternal Aunt    • No Known Problems Maternal Aunt    • No Known Problems Maternal Aunt    • No Known Problems Maternal Aunt    • No Known Problems Maternal Aunt    • No Known Problems Maternal Aunt    • No Known Problems Maternal Aunt    • No Known Problems Maternal Aunt    • No Known Problems Paternal Aunt    • No Known Problems Paternal Aunt    • No Known Problems Paternal Aunt    • No Known Problems Paternal Aunt    • No Known Problems Paternal Aunt    • No Known Problems Paternal Aunt    • Thyroid disease Family    • Breast cancer Cousin 47     Social History     Tobacco Use   • Smoking status: Never     Passive exposure: Never   • Smokeless tobacco: Never   Substance Use Topics   • Alcohol use: Not Currently     Alcohol/week: 1.0 standard drink of alcohol     Types: 1 Shots of liquor per week     Comment: occ     Allergies   Allergen Reactions   • Latex Rash       Current Outpatient Medications:   •  buPROPion (WELLBUTRIN) 100 mg tablet, TAKE 1 TABLET BY MOUTH 3 TIMES A DAY., Disp: 90 tablet, Rfl: 3  •  Calcium Carbonate 1500 (600 Ca) MG TABS, 2 TABS THREE TIMES DAILY WITH MEALS, Disp: , Rfl:   •  cholecalciferol (VITAMIN D3) 1,000 units tablet, Take 2 tablets by mouth daily, Disp: , Rfl:   •  [facility] Calcium 600 MG tablet, 2 tabs three times daily with meals, Disp: 180 tablet, Rfl: 5  •  Estradiol 6 MG PLLT, 6  mg every 4 (four) months, Disp: , Rfl:   •  levothyroxine 112 mcg tablet, Take 112 mcg by mouth daily, Disp: , Rfl:   •  loratadine (CLARITIN) 10 mg tablet, Take 10 mg by mouth daily, Disp: , Rfl:   •  magnesium oxide (MAG-OX) 400 mg tablet, Take 400 mg by mouth daily at bedtime, Disp: , Rfl:   •  Multiple Vitamins-Minerals (BARIATRIC FUSION PO), Take 1 tablet by mouth daily, Disp: , Rfl:   •  naltrexone (REVIA) 50 mg tablet, Take 1/2 tablet 2 times a day, Disp: 90 tablet, Rfl: 0  •  pantoprazole (PROTONIX) 40 mg tablet, Take 40 mg by mouth daily, Disp: , Rfl:   •  Testosterone 50 MG PLLT, 50 mg every 4 (four) months, Disp: , Rfl:   •  Zenpep [id number] units CPEP, Take 5 capsules by mouth daily Takes 5-7 caps/day, Disp: , Rfl:   •  buPROPion (WELLBUTRIN) 100 mg tablet, Take 1 tablet (100 mg total) by mouth 3 (three) times a day (Patient not taking: Reported on 7/25/2024), Disp: 270 tablet, Rfl: 1  •  clobetasol (TEMOVATE) 0.05 % cream, as needed (Patient not taking: Reported on 7/25/2024), Disp: , Rfl:   •  fexofenadine (Allegra Allergy) 60 MG tablet, Take 100 mg by mouth daily (Patient not taking: Reported on 5/20/2024), Disp: , Rfl:   •  naltrexone (REVIA) 50 mg tablet, Take 1/2 tablet 2 times a day, Disp: 90 tablet, Rfl: 1    Review of Systems   Constitutional:  Negative for activity change, appetite change, chills, diaphoresis, fatigue, fever and unexpected weight change.   HENT:  Negative for trouble swallowing and voice change.    Eyes:  Negative for visual disturbance.   Respiratory:  Negative for shortness of breath.    Cardiovascular:  Negative for chest pain and palpitations.   Gastrointestinal:  Negative for abdominal pain, constipation and diarrhea.   Endocrine: Negative for cold intolerance, heat intolerance, polydipsia, polyphagia and polyuria.   Genitourinary:  Negative for frequency and menstrual problem.   Musculoskeletal:  Positive for arthralgias (shoulder injury). Negative for myalgias.    Skin:  Negative for rash.   Allergic/Immunologic: Negative for food allergies.   Neurological:  Negative for dizziness and tremors.   Hematological:  Negative for adenopathy.   Psychiatric/Behavioral:  Negative for sleep disturbance.    All other systems reviewed and are negative.      Physical Exam:  Body mass index is 29.06 kg/m².  /82 (BP Location: Left arm, Patient Position: Sitting, Cuff Size: Adult)   Pulse 75   Ht 5' (1.524 m)   Wt 67.5 kg (148 lb 12.8 oz)   SpO2 98%   BMI 29.06 kg/m²    Wt Readings from Last 3 Encounters:   07/25/24 67.5 kg (148 lb 12.8 oz)   05/20/24 68.8 kg (151 lb 9.6 oz)   02/21/24 70.8 kg (156 lb)       Physical Exam  Vitals reviewed.   Constitutional:       General: She is not in acute distress.     Appearance: Normal appearance. She is well-developed. She is not toxic-appearing.   HENT:      Head: Normocephalic and atraumatic.   Eyes:      Conjunctiva/sclera: Conjunctivae normal.      Pupils: Pupils are equal, round, and reactive to light.   Neck:      Thyroid: No thyromegaly.   Cardiovascular:      Rate and Rhythm: Normal rate and regular rhythm.      Heart sounds: Normal heart sounds.   Pulmonary:      Effort: Pulmonary effort is normal. No respiratory distress.      Breath sounds: Normal breath sounds. No wheezing or rales.   Abdominal:      General: Bowel sounds are normal. There is no distension.      Palpations: Abdomen is soft.      Tenderness: There is no abdominal tenderness.   Musculoskeletal:         General: Normal range of motion.      Cervical back: Normal range of motion and neck supple.   Skin:     General: Skin is warm and dry.   Neurological:      Mental Status: She is alert and oriented to person, place, and time.   Psychiatric:         Mood and Affect: Mood normal.         Behavior: Behavior normal.         Labs:       Discussed with the patient and all questioned fully answered. She will call me if any problems arise.    Follow-up appointment in 6  months.     Counseled patient on diagnostic results, prognosis, risk and benefit of treatment options, instruction for management, importance of treatment compliance, Risk  factor reduction and impressions    Joelle Manriquez PA-C

## 2024-09-06 ENCOUNTER — HOSPITAL ENCOUNTER (OUTPATIENT)
Dept: RADIOLOGY | Facility: IMAGING CENTER | Age: 64
End: 2024-09-06
Payer: COMMERCIAL

## 2024-09-06 VITALS — WEIGHT: 148 LBS | BODY MASS INDEX: 29.06 KG/M2 | HEIGHT: 60 IN

## 2024-09-06 DIAGNOSIS — Z12.31 VISIT FOR SCREENING MAMMOGRAM: ICD-10-CM

## 2024-09-06 PROCEDURE — 77063 BREAST TOMOSYNTHESIS BI: CPT

## 2024-09-06 PROCEDURE — 77067 SCR MAMMO BI INCL CAD: CPT

## 2024-09-24 DIAGNOSIS — E21.3 HYPERPARATHYROIDISM, UNSPECIFIED (HCC): ICD-10-CM

## 2024-10-25 ENCOUNTER — OFFICE VISIT (OUTPATIENT)
Dept: BARIATRICS | Facility: CLINIC | Age: 64
End: 2024-10-25
Payer: COMMERCIAL

## 2024-10-25 VITALS
WEIGHT: 146.2 LBS | SYSTOLIC BLOOD PRESSURE: 132 MMHG | TEMPERATURE: 97 F | BODY MASS INDEX: 28.7 KG/M2 | DIASTOLIC BLOOD PRESSURE: 80 MMHG | HEIGHT: 60 IN | RESPIRATION RATE: 16 BRPM | HEART RATE: 56 BPM

## 2024-10-25 DIAGNOSIS — Z86.39 HISTORY OF OBESITY: ICD-10-CM

## 2024-10-25 DIAGNOSIS — R63.8 ABNORMAL CRAVING: ICD-10-CM

## 2024-10-25 DIAGNOSIS — E66.3 OVERWEIGHT: Primary | ICD-10-CM

## 2024-10-25 DIAGNOSIS — E61.1 IRON DEFICIENCY: ICD-10-CM

## 2024-10-25 PROCEDURE — 99214 OFFICE O/P EST MOD 30 MIN: CPT | Performed by: PHYSICIAN ASSISTANT

## 2024-10-25 RX ORDER — BUPROPION HYDROCHLORIDE 100 MG/1
100 TABLET ORAL 3 TIMES DAILY
Qty: 90 TABLET | Refills: 1 | Status: SHIPPED | OUTPATIENT
Start: 2024-10-25

## 2024-10-25 RX ORDER — NALTREXONE HYDROCHLORIDE 50 MG/1
TABLET, FILM COATED ORAL
Qty: 90 TABLET | Refills: 1 | Status: SHIPPED | OUTPATIENT
Start: 2024-10-25

## 2024-10-25 RX ORDER — BUPROPION HYDROCHLORIDE 100 MG/1
100 TABLET ORAL 3 TIMES DAILY
Qty: 90 TABLET | Refills: 3 | Status: SHIPPED | OUTPATIENT
Start: 2024-10-25 | End: 2024-10-25 | Stop reason: SDUPTHER

## 2024-10-25 NOTE — ASSESSMENT & PLAN NOTE
-Patient is pursuing conservative program  -Initial weight loss goal of 5-10% weight loss for improved health  -Screening labs 3/21/22    Initial:172.9  Last Visit: 151  Current:146.2  Change:-26.7lb (change from last OV -4.8lb )    Goal:  -discussed food tracking-1200 calorie goal and 60gm protein min  -Continue to go to the gym 4 times a week. Try to increase intensity  -Continue to drink at least 60 oz water daily and protein shake       To continue on naltrexone and buproprion. Doing well and denies any side effects.    She was on topamax prior but did not lose significant weight. Would avoid GLP1 RA due to pancreatic insufficiency

## 2024-10-25 NOTE — PROGRESS NOTES
Assessment/Plan:    Overweight  -Patient is pursuing conservative program  -Initial weight loss goal of 5-10% weight loss for improved health  -Screening labs 3/21/22    Initial:172.9  Last Visit: 151  Current:146.2  Change:-26.7lb (change from last OV -4.8lb )    Goal:  -discussed food tracking-1200 calorie goal and 60gm protein min  -Continue to go to the gym 4 times a week. Try to increase intensity  -Continue to drink at least 60 oz water daily and protein shake       To continue on naltrexone and buproprion. Doing well and denies any side effects.    She was on topamax prior but did not lose significant weight. Would avoid GLP1 RA due to pancreatic insufficiency    Iron deficiency  Levels have improved. Has been following with hematology        Return in about 6 months (around 4/25/2025) for feb preferreed.       Diagnoses and all orders for this visit:    Overweight    History of obesity  -     naltrexone (REVIA) 50 mg tablet; Take 1/2 tablet 2 times a day  -     Discontinue: buPROPion (WELLBUTRIN) 100 mg tablet; Take 1 tablet (100 mg total) by mouth 3 (three) times a day  -     buPROPion (WELLBUTRIN) 100 mg tablet; Take 1 tablet (100 mg total) by mouth 3 (three) times a day    Abnormal craving  -     naltrexone (REVIA) 50 mg tablet; Take 1/2 tablet 2 times a day    Iron deficiency          Subjective:   Chief Complaint   Patient presents with    Follow-up     MWM 5M F/u; Waist 34in        Patient ID: Dayanara Trujillo  is a 64 y.o. female with excess weight/obesity here to pursue weight managment.  Patient is pursuing Conservative Program.     HPI  She has noticed she is a stress eater. She has been under more stress recent.  She has had irregular sleep more often and more vivid dreams  Wt Readings from Last 10 Encounters:   10/25/24 66.3 kg (146 lb 3.2 oz)   09/06/24 67.1 kg (148 lb)   07/25/24 67.5 kg (148 lb 12.8 oz)   05/20/24 68.8 kg (151 lb 9.6 oz)   02/21/24 70.8 kg (156 lb)   01/31/24 70.4 kg (155 lb 3.2  oz)   24 70.8 kg (156 lb)   23 74 kg (163 lb 3.2 oz)   23 72.6 kg (160 lb)   23 75 kg (165 lb 6.4 oz)       Food logging:  Increased appetite/cravings:x  Exercise:cardio/weight and PT 1-2 x week for left shoulder   Hydration:flavored water at least 60 oz, coffee 1-2 a day decaf      The following portions of the patient's history were reviewed and updated as appropriate: She  has a past medical history of Cervical cancer (HCC) and GERD (gastroesophageal reflux disease).  She   Patient Active Problem List    Diagnosis Date Noted    Iron deficiency 05/15/2024    Hyperparathyroidism (HCC) 2024    Pancreatic insufficiency 2023    Hormone replacement therapy 2023    Overweight 2022    Gastroesophageal reflux disease 2022    Bariatric surgery status 2022    Hypothyroidism 2022     She  has a past surgical history that includes Hysterectomy; Oophorectomy (Bilateral); Breast biopsy (Right);  section (1986); Urethral sling (2010); Gastric bypass (2011); Nose surgery; Other surgical history (2019); Colonoscopy (2019); and EGD (2019).  Her family history includes Breast cancer in her maternal grandmother; Breast cancer (age of onset: 47) in her cousin; Colon cancer in her cousin, cousin, and cousin; No Known Problems in her daughter, father, maternal aunt, maternal aunt, maternal aunt, maternal aunt, maternal aunt, maternal aunt, maternal aunt, maternal aunt, maternal aunt, maternal grandfather, mother, paternal aunt, paternal aunt, paternal aunt, paternal aunt, paternal aunt, paternal aunt, paternal grandfather, paternal grandmother, and sister; Thyroid disease in her family.  She  reports that she has never smoked. She has never been exposed to tobacco smoke. She has never used smokeless tobacco. She reports that she does not currently use alcohol after a past usage of about 1.0 standard drink of alcohol per week.  She reports that she does not use drugs.  Current Outpatient Medications   Medication Sig Dispense Refill    buPROPion (WELLBUTRIN) 100 mg tablet Take 1 tablet (100 mg total) by mouth 3 (three) times a day 90 tablet 1    Calcium Carbonate 1500 (600 Ca) MG TABS TAKE 2 TABS THREE TIMES DAILY WITH MEALS 150 tablet 5    cholecalciferol (VITAMIN D3) 1,000 units tablet Take 2 tablets by mouth daily      [facility] Calcium 600 MG tablet 2 tabs three times daily with meals 180 tablet 5    Estradiol 6 MG PLLT 6 mg every 4 (four) months      levothyroxine 112 mcg tablet Take 112 mcg by mouth daily      loratadine (CLARITIN) 10 mg tablet Take 10 mg by mouth daily      magnesium oxide (MAG-OX) 400 mg tablet Take 400 mg by mouth daily at bedtime      Multiple Vitamins-Minerals (BARIATRIC FUSION PO) Take 1 tablet by mouth daily      naltrexone (REVIA) 50 mg tablet Take 1/2 tablet 2 times a day 90 tablet 1    pantoprazole (PROTONIX) 40 mg tablet Take 40 mg by mouth daily      Testosterone 50 MG PLLT 50 mg every 4 (four) months      Zenpep [id number] units CPEP Take 5 capsules by mouth daily Takes 5-7 caps/day      clobetasol (TEMOVATE) 0.05 % cream as needed (Patient not taking: Reported on 7/25/2024)      fexofenadine (Allegra Allergy) 60 MG tablet Take 100 mg by mouth daily (Patient not taking: Reported on 5/20/2024)       No current facility-administered medications for this visit.     Current Outpatient Medications on File Prior to Visit   Medication Sig    Calcium Carbonate 1500 (600 Ca) MG TABS TAKE 2 TABS THREE TIMES DAILY WITH MEALS    cholecalciferol (VITAMIN D3) 1,000 units tablet Take 2 tablets by mouth daily    [facility] Calcium 600 MG tablet 2 tabs three times daily with meals    Estradiol 6 MG PLLT 6 mg every 4 (four) months    levothyroxine 112 mcg tablet Take 112 mcg by mouth daily    loratadine (CLARITIN) 10 mg tablet Take 10 mg by mouth daily    magnesium oxide (MAG-OX) 400 mg tablet Take 400 mg by mouth daily at bedtime     Multiple Vitamins-Minerals (BARIATRIC FUSION PO) Take 1 tablet by mouth daily    pantoprazole (PROTONIX) 40 mg tablet Take 40 mg by mouth daily    Testosterone 50 MG PLLT 50 mg every 4 (four) months    Zenpep [id number] units CPEP Take 5 capsules by mouth daily Takes 5-7 caps/day    [DISCONTINUED] buPROPion (WELLBUTRIN) 100 mg tablet TAKE 1 TABLET BY MOUTH 3 TIMES A DAY.    [DISCONTINUED] naltrexone (REVIA) 50 mg tablet Take 1/2 tablet 2 times a day    clobetasol (TEMOVATE) 0.05 % cream as needed (Patient not taking: Reported on 7/25/2024)    fexofenadine (Allegra Allergy) 60 MG tablet Take 100 mg by mouth daily (Patient not taking: Reported on 5/20/2024)    [DISCONTINUED] buPROPion (WELLBUTRIN) 100 mg tablet Take 1 tablet (100 mg total) by mouth 3 (three) times a day (Patient not taking: Reported on 7/25/2024)    [DISCONTINUED] naltrexone (REVIA) 50 mg tablet Take 1/2 tablet 2 times a day     No current facility-administered medications on file prior to visit.     She is allergic to latex..    Review of Systems   Constitutional:  Negative for fever.   Respiratory:  Negative for shortness of breath.    Cardiovascular:  Negative for chest pain and palpitations.   Gastrointestinal:  Negative for abdominal pain, constipation, diarrhea and vomiting.   Genitourinary:  Negative for difficulty urinating.   Musculoskeletal:         Shoulder pain but improving   Skin:  Negative for rash.   Neurological:  Negative for headaches.   Psychiatric/Behavioral:  Negative for dysphoric mood. The patient is not nervous/anxious.        Objective:    /80 (BP Location: Left arm, Patient Position: Sitting)   Pulse 56   Temp (!) 97 °F (36.1 °C) (Tympanic)   Resp 16   Ht 5' (1.524 m)   Wt 66.3 kg (146 lb 3.2 oz)   BMI 28.55 kg/m²      Physical Exam  Vitals and nursing note reviewed.   Constitutional:       General: She is not in acute distress.     Appearance: Normal appearance. She is well-developed.   HENT:      Head:  Normocephalic and atraumatic.   Eyes:      Conjunctiva/sclera: Conjunctivae normal.   Neck:      Thyroid: No thyromegaly.   Pulmonary:      Effort: Pulmonary effort is normal. No respiratory distress.   Skin:     Findings: No rash (visible).   Neurological:      Mental Status: She is alert and oriented to person, place, and time.   Psychiatric:         Mood and Affect: Mood normal.         Behavior: Behavior normal.

## 2024-10-25 NOTE — ASSESSMENT & PLAN NOTE
----- Message from Reza Sanabria MD sent at 9/10/2021  7:29 AM CDT -----  Labs are stable, should be contacted for doppler of her R leg to evaluate.  Follow up as planned.   Levels have improved. Has been following with hematology

## 2025-01-03 ENCOUNTER — APPOINTMENT (OUTPATIENT)
Dept: LAB | Age: 65
End: 2025-01-03
Payer: COMMERCIAL

## 2025-01-03 DIAGNOSIS — E03.9 HYPOTHYROIDISM, UNSPECIFIED TYPE: ICD-10-CM

## 2025-01-03 DIAGNOSIS — E21.3 HYPERPARATHYROIDISM (HCC): ICD-10-CM

## 2025-01-03 LAB
25(OH)D3 SERPL-MCNC: 68.1 NG/ML (ref 30–100)
ALBUMIN SERPL BCG-MCNC: 4 G/DL (ref 3.5–5)
ALP SERPL-CCNC: 45 U/L (ref 34–104)
ALT SERPL W P-5'-P-CCNC: 23 U/L (ref 7–52)
ANION GAP SERPL CALCULATED.3IONS-SCNC: 8 MMOL/L (ref 4–13)
AST SERPL W P-5'-P-CCNC: 20 U/L (ref 13–39)
BILIRUB SERPL-MCNC: 0.49 MG/DL (ref 0.2–1)
BUN SERPL-MCNC: 16 MG/DL (ref 5–25)
CALCIUM SERPL-MCNC: 9 MG/DL (ref 8.4–10.2)
CHLORIDE SERPL-SCNC: 107 MMOL/L (ref 96–108)
CO2 SERPL-SCNC: 26 MMOL/L (ref 21–32)
CREAT SERPL-MCNC: 0.74 MG/DL (ref 0.6–1.3)
GFR SERPL CREATININE-BSD FRML MDRD: 85 ML/MIN/1.73SQ M
GLUCOSE P FAST SERPL-MCNC: 77 MG/DL (ref 65–99)
POTASSIUM SERPL-SCNC: 4.7 MMOL/L (ref 3.5–5.3)
PROT SERPL-MCNC: 6.8 G/DL (ref 6.4–8.4)
PTH-INTACT SERPL-MCNC: 76.8 PG/ML (ref 12–88)
SODIUM SERPL-SCNC: 141 MMOL/L (ref 135–147)
T4 FREE SERPL-MCNC: 0.78 NG/DL (ref 0.61–1.12)
TSH SERPL DL<=0.05 MIU/L-ACNC: 2.42 UIU/ML (ref 0.45–4.5)

## 2025-01-03 PROCEDURE — 82306 VITAMIN D 25 HYDROXY: CPT

## 2025-01-03 PROCEDURE — 84443 ASSAY THYROID STIM HORMONE: CPT

## 2025-01-03 PROCEDURE — 83970 ASSAY OF PARATHORMONE: CPT

## 2025-01-03 PROCEDURE — 84439 ASSAY OF FREE THYROXINE: CPT

## 2025-01-03 PROCEDURE — 36415 COLL VENOUS BLD VENIPUNCTURE: CPT

## 2025-01-03 PROCEDURE — 80053 COMPREHEN METABOLIC PANEL: CPT

## 2025-01-06 ENCOUNTER — OFFICE VISIT (OUTPATIENT)
Dept: ENDOCRINOLOGY | Facility: CLINIC | Age: 65
End: 2025-01-06
Payer: COMMERCIAL

## 2025-01-06 VITALS
SYSTOLIC BLOOD PRESSURE: 124 MMHG | DIASTOLIC BLOOD PRESSURE: 80 MMHG | HEART RATE: 68 BPM | BODY MASS INDEX: 28.7 KG/M2 | HEIGHT: 60 IN | WEIGHT: 146.2 LBS

## 2025-01-06 DIAGNOSIS — E03.9 HYPOTHYROIDISM, UNSPECIFIED TYPE: ICD-10-CM

## 2025-01-06 DIAGNOSIS — E21.3 HYPERPARATHYROIDISM, UNSPECIFIED (HCC): Primary | ICD-10-CM

## 2025-01-06 PROCEDURE — 99214 OFFICE O/P EST MOD 30 MIN: CPT | Performed by: INTERNAL MEDICINE

## 2025-01-06 NOTE — PROGRESS NOTES
2025    Assessment & Plan      Diagnoses and all orders for this visit:    Hyperparathyroidism, unspecified (HCC)  -     TSH, 3rd generation; Future  -     T4, free; Future  -     Comprehensive metabolic panel; Future  -     PTH, intact; Future  -     Vitamin D 25 hydroxy; Future    Hypothyroidism, unspecified type  -     TSH, 3rd generation; Future  -     T4, free; Future  -     Comprehensive metabolic panel; Future  -     PTH, intact; Future  -     Vitamin D 25 hydroxy; Future        Assessment/Plan:  1.  Hypothyroidism: Doing well on current levothyroxine.  Will monitor over time.    2.  Hyperparathyroidism: This is secondary in the setting of malabsorption.  Continue current calcium and vitamin D supplementation.  Monitor lab work prior to next appointment which will be in 6 months.      CC: Follow-up    History of Present Illness     HPI: Dayanara Trujillo is a 64 y.o. year old female who presents for a follow-up appointment for history of hyperparathyroidism.  This was discovered during lab evaluation.  She has a history of Alex-en-Y gastric bypass surgery in .  She continues on calcium and vitamin D supplementation.  She also has a history of hypothyroidism treated with levothyroxine 112 mcg daily.  Hyperparathyroidism was thought to be secondary in nature in the setting of decreased absorption.  She continues on calcium supplementation 2 tablets 3 times daily with meals as well as vitamin D 2000 units daily.  She presents today overall feeling well.  Denies any new health issues or symptoms of concern for me today.    Review of Systems   All other systems reviewed and are negative.      Historical Information   Past Medical History:   Diagnosis Date    Cervical cancer (HCC)     GERD (gastroesophageal reflux disease)      Past Surgical History:   Procedure Laterality Date    BREAST BIOPSY Right      SECTION  1986    COLONOSCOPY  2019    2nd procedure    EGD  2019    GASTRIC  BYPASS  04/18/2011    HYSTERECTOMY      Age 48    NOSE SURGERY      removal of a growth in the right nostril    OOPHORECTOMY Bilateral     Age 48    OTHER SURGICAL HISTORY  04/23/2019    Cervical Intraepithelial Neioplasia Grage 2 Removal    URETHRAL SLING  02/12/2010     Social History   Social History     Substance and Sexual Activity   Alcohol Use Yes    Alcohol/week: 1.0 standard drink of alcohol    Types: 1 Glasses of wine per week    Comment: every 2 to 3 weeks     Social History     Substance and Sexual Activity   Drug Use Never     Social History     Tobacco Use   Smoking Status Never    Passive exposure: Never   Smokeless Tobacco Never     Family History:   Family History   Problem Relation Age of Onset    Osteoporosis Mother     No Known Problems Father     Parkinsonism Sister     No Known Problems Maternal Aunt     No Known Problems Maternal Aunt     No Known Problems Maternal Aunt     No Known Problems Maternal Aunt     No Known Problems Maternal Aunt     No Known Problems Maternal Aunt     No Known Problems Maternal Aunt     No Known Problems Maternal Aunt     No Known Problems Maternal Aunt     No Known Problems Paternal Aunt     No Known Problems Paternal Aunt     No Known Problems Paternal Aunt     No Known Problems Paternal Aunt     No Known Problems Paternal Aunt     No Known Problems Paternal Aunt     Breast cancer Maternal Grandmother         unknown age    No Known Problems Maternal Grandfather     No Known Problems Paternal Grandmother     No Known Problems Paternal Grandfather     Thyroid disease Family     No Known Problems Daughter     Breast cancer Cousin 47    Colon cancer Cousin     Colon cancer Cousin     Colon cancer Cousin        Meds/Allergies   Current Outpatient Medications   Medication Sig Dispense Refill    buPROPion (WELLBUTRIN) 100 mg tablet Take 1 tablet (100 mg total) by mouth 3 (three) times a day 90 tablet 1    Calcium Carbonate 1500 (600 Ca) MG TABS TAKE 2 TABS THREE TIMES  DAILY WITH MEALS 150 tablet 5    cholecalciferol (VITAMIN D3) 1,000 units tablet Take 2 tablets by mouth daily      [facility] Calcium 600 MG tablet 2 tabs three times daily with meals 180 tablet 5    Estradiol 6 MG PLLT 6 mg every 4 (four) months      levothyroxine 112 mcg tablet Take 112 mcg by mouth daily      loratadine (CLARITIN) 10 mg tablet Take 10 mg by mouth daily      magnesium oxide (MAG-OX) 400 mg tablet Take 400 mg by mouth daily at bedtime      Multiple Vitamins-Minerals (BARIATRIC FUSION PO) Take 1 tablet by mouth daily      naltrexone (REVIA) 50 mg tablet Take 1/2 tablet 2 times a day 90 tablet 1    pantoprazole (PROTONIX) 40 mg tablet Take 40 mg by mouth daily      Testosterone 50 MG PLLT 50 mg every 4 (four) months      Zenpep 93649-880213 units CPEP Take 5 capsules by mouth daily Takes 5-7 caps/day      clobetasol (TEMOVATE) 0.05 % cream as needed (Patient not taking: Reported on 1/6/2025)      fexofenadine (Allegra Allergy) 60 MG tablet Take 100 mg by mouth daily (Patient not taking: Reported on 1/6/2025)       No current facility-administered medications for this visit.     Allergies   Allergen Reactions    Latex Rash       Objective   Vitals: Blood pressure 124/80, pulse 68, height 5' (1.524 m), weight 66.3 kg (146 lb 3.2 oz), not currently breastfeeding.  Invasive Devices       None                   Physical Exam  Vitals reviewed.   Constitutional:       General: She is not in acute distress.     Appearance: She is well-developed. She is not diaphoretic.   HENT:      Head: Normocephalic and atraumatic.   Eyes:      Conjunctiva/sclera: Conjunctivae normal.      Pupils: Pupils are equal, round, and reactive to light.   Neck:      Thyroid: No thyromegaly.   Cardiovascular:      Rate and Rhythm: Normal rate and regular rhythm.   Pulmonary:      Effort: Pulmonary effort is normal. No respiratory distress.      Breath sounds: Normal breath sounds.   Abdominal:      General: Bowel sounds are normal.       "Palpations: Abdomen is soft.   Musculoskeletal:         General: Normal range of motion.      Cervical back: Normal range of motion and neck supple.   Skin:     General: Skin is warm and dry.      Findings: No rash.   Neurological:      Mental Status: She is alert and oriented to person, place, and time.      Motor: No abnormal muscle tone.   Psychiatric:         Behavior: Behavior normal.         The history was obtained from the review of the chart and from the patient.    Lab Results:      Component      Latest Ref Rng 1/3/2025   Sodium      135 - 147 mmol/L 141    Potassium      3.5 - 5.3 mmol/L 4.7    Chloride      96 - 108 mmol/L 107    Carbon Dioxide      21 - 32 mmol/L 26    ANION GAP      4 - 13 mmol/L 8    BUN      5 - 25 mg/dL 16    Creatinine      0.60 - 1.30 mg/dL 0.74    GLUCOSE, FASTING      65 - 99 mg/dL 77    Calcium      8.4 - 10.2 mg/dL 9.0    AST      13 - 39 U/L 20    ALT      7 - 52 U/L 23    ALK PHOS      34 - 104 U/L 45    Total Protein      6.4 - 8.4 g/dL 6.8    Albumin      3.5 - 5.0 g/dL 4.0    Total Bilirubin      0.20 - 1.00 mg/dL 0.49    GFR, Calculated      ml/min/1.73sq m 85    TSH 3RD GENERATON      0.450 - 4.500 uIU/mL 2.415    FREE T4      0.61 - 1.12 ng/dL 0.78    VITAMIN D, 25-HYDROXY      30.0 - 100.0 ng/mL 68.1    PTH      12.0 - 88.0 pg/mL 76.8          Future Appointments   Date Time Provider Department Center   1/6/2025  8:10 AM DO BAYRON Adkins CTR [facility]   1/28/2025  9:30 AM SH [facility]   2/10/2025 10:15 AM Quynh Manriquez PA-C [facility]   2/25/2025 10:00 AM Carolynn Elam PA-C [facility]       Portions of the record may have been created with voice recognition software. Occasional wrong word or \"sound a like\" substitutions may have occurred due to the inherent limitations of voice recognition software. Read the chart carefully and recognize, using context, where substitutions have occurred.    "

## 2025-01-10 DIAGNOSIS — Z86.39 HISTORY OF OBESITY: ICD-10-CM

## 2025-01-10 RX ORDER — BUPROPION HYDROCHLORIDE 100 MG/1
100 TABLET ORAL 3 TIMES DAILY
Qty: 90 TABLET | Refills: 1 | Status: SHIPPED | OUTPATIENT
Start: 2025-01-10

## 2025-01-26 ENCOUNTER — TRANSCRIBE ORDERS (OUTPATIENT)
Dept: ADMINISTRATIVE | Age: 65
End: 2025-01-26

## 2025-01-26 ENCOUNTER — APPOINTMENT (OUTPATIENT)
Dept: LAB | Age: 65
End: 2025-01-26
Payer: COMMERCIAL

## 2025-01-26 DIAGNOSIS — D50.9 IRON DEFICIENCY ANEMIA, UNSPECIFIED IRON DEFICIENCY ANEMIA TYPE: ICD-10-CM

## 2025-01-26 DIAGNOSIS — D50.9 IRON DEFICIENCY ANEMIA, UNSPECIFIED IRON DEFICIENCY ANEMIA TYPE: Primary | ICD-10-CM

## 2025-01-26 LAB — FERRITIN SERPL-MCNC: 44 NG/ML (ref 11–307)

## 2025-01-26 PROCEDURE — 36415 COLL VENOUS BLD VENIPUNCTURE: CPT

## 2025-01-26 PROCEDURE — 82728 ASSAY OF FERRITIN: CPT

## 2025-01-28 ENCOUNTER — HOSPITAL ENCOUNTER (OUTPATIENT)
Dept: RADIOLOGY | Facility: IMAGING CENTER | Age: 65
Discharge: HOME | End: 2025-01-28
Payer: COMMERCIAL

## 2025-01-28 DIAGNOSIS — R79.89 ELEVATED PARATHYROID HORMONE: ICD-10-CM

## 2025-01-28 DIAGNOSIS — E21.3 HYPERPARATHYROIDISM (HCC): ICD-10-CM

## 2025-01-28 PROCEDURE — 77080 DXA BONE DENSITY AXIAL: CPT

## 2025-02-04 ENCOUNTER — RESULTS FOLLOW-UP (OUTPATIENT)
Dept: ENDOCRINOLOGY | Facility: CLINIC | Age: 65
End: 2025-02-04

## 2025-02-10 ENCOUNTER — OFFICE VISIT (OUTPATIENT)
Dept: BARIATRICS | Facility: CLINIC | Age: 65
End: 2025-02-10
Payer: COMMERCIAL

## 2025-02-10 VITALS
HEIGHT: 60 IN | RESPIRATION RATE: 16 BRPM | HEART RATE: 84 BPM | SYSTOLIC BLOOD PRESSURE: 124 MMHG | BODY MASS INDEX: 28.82 KG/M2 | WEIGHT: 146.8 LBS | DIASTOLIC BLOOD PRESSURE: 78 MMHG

## 2025-02-10 DIAGNOSIS — E21.3 HYPERPARATHYROIDISM (HCC): ICD-10-CM

## 2025-02-10 DIAGNOSIS — Z86.39 HISTORY OF OBESITY: ICD-10-CM

## 2025-02-10 DIAGNOSIS — R63.8 ABNORMAL CRAVING: ICD-10-CM

## 2025-02-10 DIAGNOSIS — E03.9 HYPOTHYROIDISM: Primary | ICD-10-CM

## 2025-02-10 PROCEDURE — 99214 OFFICE O/P EST MOD 30 MIN: CPT | Performed by: PHYSICIAN ASSISTANT

## 2025-02-10 RX ORDER — BUPROPION HYDROCHLORIDE 100 MG/1
100 TABLET ORAL 3 TIMES DAILY
Qty: 270 TABLET | Refills: 1 | Status: SHIPPED | OUTPATIENT
Start: 2025-02-10

## 2025-02-10 RX ORDER — NALTREXONE HYDROCHLORIDE 50 MG/1
TABLET, FILM COATED ORAL
Qty: 90 TABLET | Refills: 1 | Status: SHIPPED | OUTPATIENT
Start: 2025-02-10

## 2025-02-10 NOTE — ASSESSMENT & PLAN NOTE
Secondary to Calcium/Vit D Absorption. This has improved. Continue Calcium/Vitamin D and continue to follow with endocrinology/surgical team as scheduled.

## 2025-02-10 NOTE — PROGRESS NOTES
Assessment & Plan  History of obesity  Continue Wellbutrin and Naltrexone  Refer to RD for follow up visit for post op support/meal planning  Discussed importance of resuming regular exercise. Recommend  working up to 150 minutes per week including full body strength training 2x per week. She plans to join [employer] in [place]  Her goal is to lose an additional 10 pounds-- Advised her that she has done an amazing job losing/maintaining an approximately 60+ pound weight loss.  No evidence of HTN, hyperlipidemia, or Diabetes.  Would recommend focusing on overall health and consistency with exercise rather than a  number on a scale at this time.  Hypothyroidism  Thyroid function is normal on levothyroxine. She follows with endocrinology.   Hyperparathyroidism (HCC)  Secondary to Calcium/Vit D Absorption. This has improved. Continue Calcium/Vitamin D and continue to follow with endocrinology/surgical team as scheduled.        Return in about 6 months (around 8/10/2025) for Follow up Kena 6 sheila Pradhan.  (also keep upcoming earle visit).         Subjective:   Chief Complaint   Patient presents with   • Follow-up     MWM F/u; Waist 33in       Patient ID: Dayanara Trujillo  is a 64 y.o. female with excess weight/obesity here for Weight Management Follow up Visit    HPI: Here for Medical Weight Management Follow Up Visit    Hx RNY in 2011  Hx iron deficiency, follows with hem onc  Hx secondary hyperparathyroidism- on Calciu,Vit D  Following with endocrinology for hypothyroidism, osteopenia, and secondary hyperparathyroidism.      Obesity/Excess Weight:  Current BMI: Body mass index is 28.67 kg/m².   Initial Weight: 172.9  (reports weight ~210 prior to surgery in 2011)  Last visit Weight: 146  Current Weight: 146    Current Weight Management Plan:   Current Medication: Wellbutrin 100mg TID and Naltrexone BID  Medication Side effects:  none  Food Logging: Yes  *previously treated with topamax-- not effective  GLP1  avoided due to hx pancreatic insufficiency    Over xmas, did do  a lot of baking and kept cookies within reach, so wasn't as strict with meal plan.   Snacking a little more-- feels that the meals are good, but should cut back a bit on snacking.     Food Recall:  Breakfast: Bread, cheese, grapes or berries   Lunch: Salad with Chicken OR Eggs with chicken  Dinner: Meat or Fish, occasional 90% lean burger  Occasional snack-- nilla wafers or biscotti or a few m&ms  Avoids fatty food due to hx pancreatic insufficiency.        Lifestyle History:  Exercise: Was going to the gym daily -- but stopped before xmas.   Plans to resume at a different gym.       Wt Readings from Last 20 Encounters:   02/10/25 66.6 kg (146 lb 12.8 oz)   25 66.3 kg (146 lb 3.2 oz)   10/25/24 66.3 kg (146 lb 3.2 oz)   24 67.1 kg (148 lb)   24 67.5 kg (148 lb 12.8 oz)   24 68.8 kg (151 lb 9.6 oz)   24 70.8 kg (156 lb)   24 70.4 kg (155 lb 3.2 oz)   24 70.8 kg (156 lb)   23 74 kg (163 lb 3.2 oz)   23 72.6 kg (160 lb)   23 75 kg (165 lb 6.4 oz)   23 75.4 kg (166 lb 3.2 oz)   23 75.3 kg (166 lb)   23 76.1 kg (167 lb 12.8 oz)   23 75.6 kg (166 lb 9.6 oz)   23 77.2 kg (170 lb 3.2 oz)   22 77 kg (169 lb 12.8 oz)   11/15/22 74.8 kg (165 lb)   22 76.7 kg (169 lb)        Review of Systems   Musculoskeletal:  Positive for arthralgias.       Past Medical History:   Diagnosis Date   • Cervical cancer (HCC)    • GERD (gastroesophageal reflux disease)        Past Surgical History:   Procedure Laterality Date   • BREAST BIOPSY Right    •  SECTION  1986   • COLONOSCOPY  2019    2nd procedure   • EGD  2019   • GASTRIC BYPASS  2011   • HYSTERECTOMY      Age 48   • NOSE SURGERY      removal of a growth in the right nostril   • OOPHORECTOMY Bilateral     Age 48   • OTHER SURGICAL HISTORY  2019    Cervical Intraepithelial Neioplasia  "Grage 2 Removal   • URETHRAL SLING  02/12/2010        Allergies   Allergen Reactions   • Latex Rash        Objective:    /78 (BP Location: Left arm, Patient Position: Sitting)   Pulse 84   Resp 16   Ht 5' (1.524 m)   Wt 66.6 kg (146 lb 12.8 oz)   BMI 28.67 kg/m²     Physical Exam:  Constitutional:  she  appears well-developed and well-nourished. No distress.   HENT:   Head: Normocephalic and atraumatic.   Neck: Normal range of motion.   Pulmonary/Chest: Effort normal.   Musculoskeletal: Normal range of motion.   Neurological: she  is alert and oriented to person, place, and time.   Skin: she  is not diaphoretic.   Psychiatric: she  has a normal mood and affect. her  behavior is normal.        LABS:    No results found for: \"HGBA1C\"   Lab Results   Component Value Date     02/16/2015    SODIUM 141 01/03/2025    K 4.7 01/03/2025     01/03/2025    CO2 26 01/03/2025    ANIONGAP 5 02/16/2015    AGAP 8 01/03/2025    BUN 16 01/03/2025    CREATININE 0.74 01/03/2025    GLUC 92 11/15/2022    GLUF 77 01/03/2025    CALCIUM 9.0 01/03/2025    AST 20 01/03/2025    ALT 23 01/03/2025    ALKPHOS 45 01/03/2025    PROT 7.6 02/16/2015    TP 6.8 01/03/2025    BILITOT 0.36 02/16/2015    TBILI 0.49 01/03/2025    EGFR 85 01/03/2025 01/19/2021   Lab Results   Component Value Date    MZU1JGGVPSPY 2.415 01/03/2025    TSH 1.71 08/13/2019      "

## 2025-02-25 ENCOUNTER — OFFICE VISIT (OUTPATIENT)
Dept: BARIATRICS | Facility: CLINIC | Age: 65
End: 2025-02-25
Payer: COMMERCIAL

## 2025-02-25 VITALS
OXYGEN SATURATION: 99 % | TEMPERATURE: 98.4 F | HEART RATE: 88 BPM | HEIGHT: 60 IN | SYSTOLIC BLOOD PRESSURE: 132 MMHG | WEIGHT: 146.5 LBS | DIASTOLIC BLOOD PRESSURE: 70 MMHG | BODY MASS INDEX: 28.76 KG/M2

## 2025-02-25 DIAGNOSIS — Z48.815 ENCOUNTER FOR SURGICAL AFTERCARE FOLLOWING SURGERY OF DIGESTIVE SYSTEM: Primary | ICD-10-CM

## 2025-02-25 DIAGNOSIS — Z98.84 BARIATRIC SURGERY STATUS: ICD-10-CM

## 2025-02-25 DIAGNOSIS — K91.2 POSTSURGICAL MALABSORPTION: ICD-10-CM

## 2025-02-25 DIAGNOSIS — K86.89 PANCREATIC INSUFFICIENCY: ICD-10-CM

## 2025-02-25 DIAGNOSIS — E03.9 HYPOTHYROIDISM: ICD-10-CM

## 2025-02-25 DIAGNOSIS — E66.3 OVERWEIGHT: ICD-10-CM

## 2025-02-25 PROCEDURE — 99214 OFFICE O/P EST MOD 30 MIN: CPT | Performed by: PHYSICIAN ASSISTANT

## 2025-02-25 RX ORDER — CELECOXIB 200 MG/1
1 CAPSULE ORAL DAILY
COMMUNITY
Start: 2025-02-02

## 2025-02-25 NOTE — PROGRESS NOTES
Date of surgery: 2011  Procedure: RNY   Performing surgeon: Dr. Cheney     Initial Weight -  170.5 lb   Current Weight - 146.5 lb   Giles Weight - 146.5 lb ( current weight)   Total Body Weight Loss (EWL)- 24.0  EWL% - 60%  TWB % - 14%

## 2025-02-25 NOTE — PATIENT INSTRUCTIONS
Follow-up in June as scheduled with me for medication checkl. We kindly ask that your arrive 15 minutes before your scheduled appointment time with your provider to allow our staff to room you, get your vital signs and update your chart.    Get lab work done . Please call the office if you need a script.  It is recommended to check with your insurance BEFORE getting labs done to make sure they are covered by your policy.      Call our office if you have any problems with abdominal pain especially associated with fever, chills, nausea, vomiting or any other concerns.    All  Post-bariatric surgery patients should be aware that very small quantities of any alcohol can cause impairment and it is very possible not to feel the effect. The effect can be in the system for several hours.  It is also a stomach irritant.     It is advised to AVOID alcohol, Nonsteroidal antiinflammatory drugs (NSAIDS) and nicotine of all forms . Any of these can cause stomach irritation/pain.    Discussed the effects of alcohol on a bariatric patient and the increased impairment risk.     Keep up the good work!

## 2025-02-25 NOTE — PROGRESS NOTES
"Assessment/Plan:     Patient ID: Dayanara Trujillo is a 64 y.o. female.     Bariatric Surgery Status    -s/p Alex-En-Y Gastric Bypass with Dr. Cheney in 2011 Presents to the office today for annual.  She continue to follow with our MWM program and is on wellbutrin and naltrexone - doing very well    Pancreatic Insufficiency   Of note, pt presented last year with c/o \"dumping\" sxs almost daily. She was being followed by GI at [facility]. She states she also developed lactose intolerance and intolerance to many other foods- develops uncomfortable bloating feeling and diarrhea mostly with pastas, breads, dairy, veggies, etc. She underwent EGD 2022 with Dr Oh which showed unremarkable bypass anatomy. She reports since this time, GI told her pancreas was not producing enough elastase and was started on medication. She reports a significant improvement in her sxs with use of pancreatic medications and cutting out certain foods. Continue GI follow up      Hyperparathyroidism   - was seen by endocrinology - had her calcium supplements adjusted to calcium citrate with plan to repeat her labs.  - has been having some increased constipation     Continued/Maintain healthy weight loss with good nutrition intakes.  Adequate hydration with at least 64oz. fluid intake.  Follow diet as discussed.  Follow vitamin and mineral recommendations as reviewed with you.  Exercise as tolerated.    Colonoscopy referral made: utd  Mammo: utd     Follow-up in June as scheduled with me for medication checkl. We kindly ask that your arrive 15 minutes before your scheduled appointment time with your provider to allow our staff to room you, get your vital signs and update your chart.    Get lab work done . Please call the office if you need a script.  It is recommended to check with your insurance BEFORE getting labs done to make sure they are covered by your policy.      Call our office if you have any problems with abdominal pain especially associated " with fever, chills, nausea, vomiting or any other concerns.    All  Post-bariatric surgery patients should be aware that very small quantities of any alcohol can cause impairment and it is very possible not to feel the effect. The effect can be in the system for several hours.  It is also a stomach irritant.     It is advised to AVOID alcohol, Nonsteroidal antiinflammatory drugs (NSAIDS) and nicotine of all forms . Any of these can cause stomach irritation/pain.    Discussed the effects of alcohol on a bariatric patient and the increased impairment risk.     Keep up the good work!     Postsurgical Malabsorption   -At risk for malabsorption of vitamins/minerals secondary to malabsorption and restriction of intake from bariatric surgery  -Currently taking adequate postop bariatric surgery vitamin supplementation  -Next set of bariatric labs ordered for approximately 3 months  -Patient received education about the importance of adhering to a lifelong supplementation regimen to avoid vitamin/mineral deficiencies      Diagnoses and all orders for this visit:    Encounter for surgical aftercare following surgery of digestive system    Bariatric surgery status  -     Iron Panel (Includes Ferritin, Iron Sat%, Iron, and TIBC); Future  -     Vitamin A; Future  -     Zinc; Future  -     Vitamin B1, whole blood; Future  -     Vitamin B12; Future  -     Folate; Future    Postsurgical malabsorption  -     Iron Panel (Includes Ferritin, Iron Sat%, Iron, and TIBC); Future  -     Vitamin A; Future  -     Zinc; Future  -     Vitamin B1, whole blood; Future  -     Vitamin B12; Future  -     Folate; Future    Pancreatic insufficiency    Overweight    Hypothyroidism    Other orders  -     Estradiol 6 MG PLLT; Use (Patient not taking: Reported on 2/25/2025)  -     celecoxib (CeleBREX) 200 mg capsule; Take 1 capsule by mouth in the morning         Subjective:      Patient ID: Dayanara Trujillo is a 64 y.o. female.    -s/p Alex-En-Y Gastric  Bypass with Dr. Cheney in 2011 Presents to the office today for annual.Tolerating diet without issues; denies N/V, dysphagia, reflux.     Initial: 170  Current: 146  EWL: (Weight loss is ahead of schedule at this post surgical period.)  Giles: current   Current BMI is Body mass index is 28.61 kg/m².    Tolerating a regular diet-yes  Eating at least 60 grams of protein per day-yes  Drinking at least 64 ounces of fluid per day-yes  Sufficient exercise-yes  Using nicotine-no  Using alcohol-no  Supplements:   lincoln mvi + mag oxide + calcium citrate + vit D3    EWL is `, which places the patient ahead of schedule for expected post surgical weight loss at this time.     The following portions of the patient's history were reviewed and updated as appropriate: allergies, current medications, past family history, past medical history, past social history, past surgical history and problem list.    Review of Systems   Constitutional: Negative.    Respiratory: Negative.     Cardiovascular: Negative.    Gastrointestinal: Negative.    Neurological: Negative.    Psychiatric/Behavioral: Negative.           Objective:    /70 (BP Location: Left arm, Patient Position: Sitting, Cuff Size: Adult)   Pulse 88   Temp 98.4 °F (36.9 °C) (Tympanic)   Ht 5' (1.524 m)   Wt 66.5 kg (146 lb 8 oz)   SpO2 99%   BMI 28.61 kg/m²      Physical Exam  Vitals and nursing note reviewed.   Constitutional:       Appearance: Normal appearance.   HENT:      Head: Normocephalic and atraumatic.   Eyes:      Extraocular Movements: Extraocular movements intact.   Cardiovascular:      Rate and Rhythm: Normal rate.   Pulmonary:      Effort: Pulmonary effort is normal.   Musculoskeletal:         General: Normal range of motion.      Cervical back: Normal range of motion.   Skin:     General: Skin is warm and dry.   Neurological:      General: No focal deficit present.      Mental Status: She is alert and oriented to person, place, and time.    Psychiatric:         Mood and Affect: Mood normal.

## 2025-03-09 NOTE — ANESTHESIA PREPROCEDURE EVALUATION
Procedure:  EGD    Relevant Problems   ENDO   (+) Hypothyroidism      GI/HEPATIC   (+) Gastroesophageal reflux disease   (+) H/O bariatric surgery      GYN  h/o cervical cancer        Physical Exam    Airway    Mallampati score: II  TM Distance: >3 FB  Neck ROM: full     Dental   No notable dental hx     Cardiovascular  Cardiovascular exam normal    Pulmonary  Pulmonary exam normal     Other Findings        Anesthesia Plan  ASA Score- 2     Anesthesia Type- general with ASA Monitors  Additional Monitors:   Airway Plan:           Plan Factors-Exercise tolerance (METS): >4 METS  Chart reviewed  Patient is not a current smoker  Patient instructed to abstain from smoking on day of procedure  Patient did not smoke on day of surgery  Obstructive sleep apnea risk education given perioperatively  Induction- intravenous  Postoperative Plan-     Informed Consent- Anesthetic plan and risks discussed with patient  Discharged

## 2025-03-14 DIAGNOSIS — E21.3 HYPERPARATHYROIDISM, UNSPECIFIED (HCC): ICD-10-CM

## 2025-04-04 DIAGNOSIS — E21.3 HYPERPARATHYROIDISM, UNSPECIFIED (HCC): ICD-10-CM

## 2025-04-04 RX ORDER — CALCIUM CARBONATE 600 MG
TABLET ORAL
Qty: 540 TABLET | Refills: 1 | Status: SHIPPED | OUTPATIENT
Start: 2025-04-04

## 2025-04-29 DIAGNOSIS — E21.3 HYPERPARATHYROIDISM, UNSPECIFIED (HCC): ICD-10-CM

## 2025-05-12 ENCOUNTER — CLINICAL SUPPORT (OUTPATIENT)
Dept: BARIATRICS | Facility: CLINIC | Age: 65
End: 2025-05-12

## 2025-05-12 VITALS — WEIGHT: 143.6 LBS | BODY MASS INDEX: 28.04 KG/M2

## 2025-05-12 DIAGNOSIS — Z98.84 BARIATRIC SURGERY STATUS: Primary | ICD-10-CM

## 2025-05-12 PROCEDURE — RECHECK: Performed by: DIETITIAN, REGISTERED

## 2025-05-12 NOTE — PROGRESS NOTES
Bariatric Nutrition Assessment Note      Type of surgery  Gastric bypass: laparoscopic  Surgery Date:  [facility] Dr Cheney   Was lost to follow up for many years   Surgeon: Dr. Marvin Oh- for follow up care    Followed by PAFabiolaC - prescribed wellbutrin & naltraxone ( Conrave ) for weight loss     Nutrition Assessment   Dayanara Trujillo  65 y.o.  female  Wt 65.1 kg (143 lb 9.6 oz)   BMI 28.04 kg/m²      Wt Readings from Last 3 Encounters:   25 66.5 kg (146 lb 8 oz)   02/10/25 66.6 kg (146 lb 12.8 oz)   25 66.3 kg (146 lb 3.2 oz)       Bailey Salcedo Equation:   1131 kcal per day  Estimated calories for weight maintenance: 5784-3564 kcal per day ( sedentary to lightly active )   Estimated calories for weight loss 857-1107 kcal per day - ( 1/2 to 1# per wk wt loss - sedentary )  Estimated calories for wt loss 555-1055( 1 to 2# per wk wt loss lightly active )   Estimated protein needs 60-71 grams per day (1.0-1.2 gms/kg IBW )   Estimated fluid needs 60-64 ounces per day (30 ml/kg IBW )      Weight on Day of Weight Loss Surgery: 210#  Weight in (lb) to have BMI = 25: 130.7#  Pre-Op Excess Wt: 79.3#  Post-Op Wt Loss: 63.5#/ 80% EBWL /30% TBWL in 14 year(s)    Review of History and Medications   Past Medical History:   Diagnosis Date    Cervical cancer (HCC)     GERD (gastroesophageal reflux disease)      Past Surgical History:   Procedure Laterality Date    BREAST BIOPSY Right      SECTION  1986    COLONOSCOPY  2019    2nd procedure    EGD  2019    GASTRIC BYPASS  2011    HYSTERECTOMY      Age 48    NOSE SURGERY      removal of a growth in the right nostril    OOPHORECTOMY Bilateral     Age 48    OTHER SURGICAL HISTORY  2019    Cervical Intraepithelial Neioplasia Grage 2 Removal    URETHRAL SLING  2010     Social History     Socioeconomic History    Marital status: /Civil Union     Spouse name: Not on file    Number of children: Not on file    Years of  education: Not on file    Highest education level: Not on file   Occupational History    Not on file   Tobacco Use    Smoking status: Never     Passive exposure: Never    Smokeless tobacco: Never   Vaping Use    Vaping status: Never Used   Substance and Sexual Activity    Alcohol use: Not Currently     Alcohol/week: 1.0 standard drink of alcohol     Types: 1 Glasses of wine per week     Comment: every 2 to 3 weeks    Drug use: Never    Sexual activity: Yes     Partners: Male     Birth control/protection: None   Other Topics Concern    Not on file   Social History Narrative    Not on file     Social Drivers of Health     Financial Resource Strain: Not on file   Food Insecurity: Not on file   Transportation Needs: Not on file   Physical Activity: Not on file   Stress: Not on file   Social Connections: Not on file   Intimate Partner Violence: Not on file   Housing Stability: Not on file       Current Outpatient Medications:     buPROPion (WELLBUTRIN) 100 mg tablet, Take 1 tablet (100 mg total) by mouth 3 (three) times a day, Disp: 270 tablet, Rfl: 1    Calcium Carbonate 1500 (600 Ca) MG TABS, TAKE 2 TABS THREE TIMES DAILY WITH MEALS, Disp: 540 tablet, Rfl: 1    celecoxib (CeleBREX) 200 mg capsule, Take 1 capsule by mouth in the morning, Disp: , Rfl:     cholecalciferol (VITAMIN D3) 1,000 units tablet, Take 2 tablets by mouth daily, Disp: , Rfl:     clobetasol (TEMOVATE) 0.05 % cream, as needed (Patient not taking: Reported on 2/25/2025), Disp: , Rfl:     [facility] Calcium 600 MG tablet, 2 tabs three times daily with meals (Patient not taking: Reported on 2/25/2025), Disp: 180 tablet, Rfl: 5    Estradiol 6 MG PLLT, 6 mg every 4 (four) months, Disp: , Rfl:     Estradiol 6 MG PLLT, Use (Patient not taking: Reported on 2/25/2025), Disp: , Rfl:     levothyroxine 112 mcg tablet, Take 112 mcg by mouth daily, Disp: , Rfl:     loratadine (CLARITIN) 10 mg tablet, Take 10 mg by mouth daily, Disp: , Rfl:     magnesium oxide (MAG-OX) 400  "mg tablet, Take 400 mg by mouth daily at bedtime, Disp: , Rfl:     Multiple Vitamins-Minerals (BARIATRIC FUSION PO), Take 1 tablet by mouth daily, Disp: , Rfl:     naltrexone (REVIA) 50 mg tablet, Take 1/2 tablet 2 times a day, Disp: 90 tablet, Rfl: 1    pantoprazole (PROTONIX) 40 mg tablet, Take 40 mg by mouth daily, Disp: , Rfl:     Testosterone 50 MG PLLT, 50 mg every 4 (four) months, Disp: , Rfl:     Zenpep [id number] units CPEP, Take 5 capsules by mouth daily Takes 5-7 caps/day, Disp: , Rfl:     Food Intake and Lifestyle Assessment   Food Intake Assessment completed via usual diet recall  Breakfast: Bread, cheese, grapes or berries   Lunch: Salad with Chicken OR Eggs with chicken  Dinner: Meat or Fish, occasional 90% lean burger with vegetables & starch   Occasional snack-- nilla wafers or biscotti or a few m&ms  Avoids fatty food due to hx pancreatic insufficiency.  Beverage intake: water and coffee/tea  Diet texture/stage: regular  Protein supplement: none - reports that anything 'creamy\" gives her diarrhea   Estimated protein intake per day: 40-60 grams   Estimated fluid intake per day: drinks plenty of water -  Meals eaten away from home: several days per week - usually breakfast after gym   Typical meal pattern: 3 meals per day and 1-2 snacks per day  Eating Behaviors: Appropriate diet advancement, Frequent snacking/ grazing and Mindless eating    Food allergies or intolerances: reports that she has become lactose intolerance  Cannot tolerate simple sugars, or any fat ( avoids all oils and butter ) nothing creamy  Avoids high spice foods   Cannot tolerate lunch meats     Cultural or Amish considerations: N/A     Vitamin and mineral regimen   Bariatric multivitamin and iron at night  Magnesium at night  Calcium 6 x per day per endocrinology     Physical Assessment  Osteopenia   Lab Results   Component Value Date    PTH 76.8 01/03/2025    CALCIUM 9.0 01/03/2025    PHOS 3.4 04/14/2024     Lab Results " "  Component Value Date    IRON 144 07/14/2024    TIBC 339 07/14/2024    FERRITIN 44 01/26/2025     Followed by hematology     Nutrition Related Findings  Dumping, Diarrhea, Nausea   Constipation     Per PA-C note: Of note, pt presented last year with c/o \"dumping\" sxs almost daily. She was being followed by GI at [facility]. She states she also developed lactose intolerance and intolerance to many other foods- develops uncomfortable bloating feeling and diarrhea mostly with pastas, breads, dairy, veggies, etc. She underwent EGD 2022 with Dr Oh which showed unremarkable bypass anatomy. She reports since this time, GI told her pancreas was not producing enough elastase and was started on medication. She reports a significant improvement in her sxs with use of pancreatic medications and cutting out certain foods.     Takes zenep pancreatic enzyme- pancreatin  Prescribed by GI doctor, assessed that her pancreatic enzymes are suboptimal   Enzymes must be taken with food , and iron separately   Takes protonix once per day in am     Also prescribed implants of estrogen and testosterone by doctor she sees in [place]     Nausea in am - takes pantoprazole     Physical Activity  Types of exercise: - 3 days per week   Gym - rides the bike , hiking, gardening   Current physical limitations: none currently     Psychosocial Assessment   Support systems: spouse  Socioeconomic factors: not assessed at this time     Nutrition Diagnosis  Diagnosis: Excessive energy intake (NI-1.5) and Inadequate protein intake (NI-5.7.3)  Related to: Food and nutrition-related knowledge deficit and Altered GI function  As Evidenced by: Unintentional weight gain and chronic diarrhea     Nutrition Prescription: Recommend the following diet  1000 calories/ 75 grams protein     Breakfast: 200-250/15-20   2  oz protein     3 presliced cheese or 2 oz lean protein    1 starch    1 fruit   Snack: 150/5-10   See handout   Lunch: 200-250/15-20   2 oz lean " "protein    Vegetable/ salad   1 starch   1 fat ( avocado )   Snack: 150/>5/20   See handout   Dinner: 200/15-20   2 oz lean protein   1 starch or fruit   1 vegetable   Snack: 150/>5/20   See handout     One additional snack allowed on work out days ( 3-4 days per week )  Omit 1 snack on active rest days       Interventions and Teaching   Patient educated on post-op nutrition guidelines.       Patient educated and handouts provided.  Surgical changes to stomach / GI  Capacity of post-surgery stomach  Diet progression  Adequate hydration  Sugar and fat restriction to decrease \"dumping syndrome\"  Fat restriction to decrease steatorrhea  Expected weight loss  Weight loss plateaus/ possibility of weight regain  Exercise  Nutrition considerations after surgery  Protein supplements  Dietary and lifestyle changes  Possible problems with poor eating habits  Intuitive eating  Potential for food intolerance after surgery, and ways to deal with them including: lactose intolerance, nausea, reflux, vomiting, diarrhea, food intolerance, appetite changes, gas  Vitamin / Mineral supplementation of Multivitamin with minerals, Calcium, Vitamin B12, Iron, Fat Soluble vitamins and Vitamin D   To reduce pills - provided with 30 day samples of Bariatric Pal once per day bariatric vitamin/mineral    To replace   Extra Vitamin D   Vitamin C   Biotin   B12   B complex   Multivitamin   In addition to once per day bariatric pal, recommend switching from hard calcium supplements to chewable calcium carbonate or soft capsule calcium carbonate taken with food twice per day    Discussed avoiding grazing to decrease weight gain  Recommended that when she is full - do not return an hour later to finish her plate.     Education provided to: patient  Handout out provided  1000 calorie meal grid      200-300 calorie meals       150 calorie snack list      Lean protein sources      Protein powder list ( provided with samples of whey protein powder- " currently uses SEEQ but concerned with cancer warning )      Protein bar list       My Weight is Over - highlighted mindful eating and dealing with set backs      Baritastic handout and reviewed use  Entered in calorie and protein goals.     Barriers to learning: No barriers identified    Readiness to change: preparation/action     Comprehension: needs reinforcement and verbalizes understanding     Expected Compliance: good    Evaluation/Monitoring   Eating pattern as discussed Tolerance of nutrition prescription Body weight Lab values Physical activity Bowel pattern    Goals  Eliminate sugar sweetened beverages, Exercise 30 minutes 5 times per week, Complete lession plans 1-6, Eat 3 meals per day and Eliminate mindless snacking   Take pancreatic enzymes with meals   Continue with  bariatric pal one a day vitamin and take calcium as prescribed by endocrinology ( 1800 mg per day )  Food log baritastic 1000 calories / 65-75 grams protein   Continue to follow with MWM      Time Spent:   1 Hour

## 2025-06-09 ENCOUNTER — OFFICE VISIT (OUTPATIENT)
Dept: BARIATRICS | Facility: CLINIC | Age: 65
End: 2025-06-09
Payer: MEDICARE

## 2025-06-09 VITALS
OXYGEN SATURATION: 98 % | TEMPERATURE: 98.3 F | BODY MASS INDEX: 28.27 KG/M2 | WEIGHT: 144 LBS | DIASTOLIC BLOOD PRESSURE: 72 MMHG | HEART RATE: 71 BPM | SYSTOLIC BLOOD PRESSURE: 124 MMHG | HEIGHT: 60 IN

## 2025-06-09 DIAGNOSIS — Z98.84 BARIATRIC SURGERY STATUS: ICD-10-CM

## 2025-06-09 DIAGNOSIS — E21.3 HYPERPARATHYROIDISM (HCC): ICD-10-CM

## 2025-06-09 DIAGNOSIS — E03.9 HYPOTHYROIDISM: ICD-10-CM

## 2025-06-09 DIAGNOSIS — E66.3 OVERWEIGHT: Primary | ICD-10-CM

## 2025-06-09 DIAGNOSIS — K91.2 POSTSURGICAL MALABSORPTION: ICD-10-CM

## 2025-06-09 PROCEDURE — 99214 OFFICE O/P EST MOD 30 MIN: CPT | Performed by: PHYSICIAN ASSISTANT

## 2025-06-09 NOTE — PROGRESS NOTES
"Assessment/Plan:   -s/p Alex-En-Y Gastric Bypass with Dr. Cheney in 2011 Presents to the office today for PO weight follow up   - started on wellbutrin and naltrexone per MWM and currently doing well with no side effects and maintaining her weight   - diet overall moderate and protein based. States she could improve her exercise but is walking her dog more     Pancreatic Insufficiency   Of note, pt presented last year with c/o \"dumping\" sxs almost daily. She was being followed by GI at [facility]. She states she also developed lactose intolerance and intolerance to many other foods- develops uncomfortable bloating feeling and diarrhea mostly with pastas, breads, dairy, veggies, etc. She underwent EGD 2022 with Dr Oh which showed unremarkable bypass anatomy. She reports since this time, GI told her pancreas was not producing enough elastase and was started on medication. She reports a significant improvement in her sxs with use of pancreatic medications and cutting out certain foods. Continue GI follow up      Hyperparathyroidism   - was seen by endocrinology - had her calcium supplements adjusted to calcium citrate with plan to repeat her labs.      Follow up in approximately 4 month    Goals:  Food log (ie.) [email or web address],etc. baritastic  No sugary beverages. At least 64oz of water daily.  Increase physical activity by 10 minutes daily. Gradually increase physical activity to a goal of 5 days per week for 30 minutes of MODERATE intensity PLUS 2 days per week of FULL BODY resistance training  5-10 servings of fruits and vegetables per day and 25-35 grams of dietary fiber per day, gradually increasing       Diagnoses and all orders for this visit:    Overweight    Hyperparathyroidism (HCC)    Bariatric surgery status    Postsurgical malabsorption    Hypothyroidism        Subjective:   Chief Complaint   Patient presents with    Follow-up     4 MO POST OP  WT GAIN FUP "     Patient ID: Dayanara Trujillo  is a 65 y.o. female with excess weight/obesity here to pursue weight management.     Diet recall  B: coffee + fruit + bread and cheese   L: salad with protein   D: protein with starch or veggie   S: popcorn or cookies     Colonoscopy-Completed    The following portions of the patient's history were reviewed and updated as appropriate: allergies, current medications, past family history, past medical history, past social history, past surgical history, and problem list.    Review of Systems   Constitutional: Negative.    Respiratory: Negative.     Cardiovascular: Negative.    Gastrointestinal: Negative.    Neurological: Negative.    Psychiatric/Behavioral: Negative.         Objective:    /72 (BP Location: Left arm, Patient Position: Sitting, Cuff Size: Large)   Pulse 71   Temp 98.3 °F (36.8 °C) (Tympanic)   Ht 5' (1.524 m)   Wt 65.3 kg (144 lb)   SpO2 98%   BMI 28.12 kg/m²      Physical Exam  Vitals and nursing note reviewed.   Constitutional:       Appearance: Normal appearance.   HENT:      Head: Normocephalic and atraumatic.     Eyes:      Extraocular Movements: Extraocular movements intact.       Cardiovascular:      Rate and Rhythm: Normal rate.   Pulmonary:      Effort: Pulmonary effort is normal.   Abdominal:      General: Bowel sounds are normal.     Musculoskeletal:         General: Normal range of motion.      Cervical back: Normal range of motion.     Skin:     General: Skin is warm and dry.     Neurological:      General: No focal deficit present.      Mental Status: She is alert and oriented to person, place, and time.     Psychiatric:         Mood and Affect: Mood normal.

## 2025-06-09 NOTE — PATIENT INSTRUCTIONS
Follow up in approximately 4 month    Goals:  Food log (ie.) [email or web address],etc. baritastic  No sugary beverages. At least 64oz of water daily.  Increase physical activity by 10 minutes daily. Gradually increase physical activity to a goal of 5 days per week for 30 minutes of MODERATE intensity PLUS 2 days per week of FULL BODY resistance training  5-10 servings of fruits and vegetables per day and 25-35 grams of dietary fiber per day, gradually increasing    Reviewed the potential side effects of Contrave, which include: abdominal upset, headache, dizziness, trouble sleeping, increased blood pressure, depression/anxiety, and fatigue. Patient should call/return if he/she develops symptoms of depression/aniety. Patient should have ER evaluation if thoughts of harming self/others occur. Contrave should be stopped prior to narcotic pain medications. Alcohol is not recommened. Notify provider with any changes in vision

## 2025-06-09 NOTE — PROGRESS NOTES
Date of surgery:2011  Procedure: RNY  Performing surgeon:Dr. Cheney    Initial Weight - 170.5 Ibs  Current Weight -144.0 Ibs  Giles Weight - 143.6 Ibs  Total Body Weight Loss (EWL)- 26.5  EWL% - 67%  TWB % -16%    For *NEW/TRANSFER* patients only: Who referred the patient? Please provide name and specialty (PCP, GI, etc.).

## 2025-07-11 ENCOUNTER — APPOINTMENT (OUTPATIENT)
Dept: LAB | Age: 65
End: 2025-07-11
Attending: INTERNAL MEDICINE
Payer: MEDICARE

## 2025-07-11 DIAGNOSIS — E21.3 HYPERPARATHYROIDISM, UNSPECIFIED (HCC): ICD-10-CM

## 2025-07-11 DIAGNOSIS — K91.2 POSTSURGICAL MALABSORPTION: ICD-10-CM

## 2025-07-11 DIAGNOSIS — E03.9 HYPOTHYROIDISM: ICD-10-CM

## 2025-07-11 DIAGNOSIS — Z98.84 BARIATRIC SURGERY STATUS: ICD-10-CM

## 2025-07-11 DIAGNOSIS — Z86.39 HISTORY OF OBESITY: ICD-10-CM

## 2025-07-11 DIAGNOSIS — E03.9 HYPOTHYROIDISM, UNSPECIFIED TYPE: ICD-10-CM

## 2025-07-11 LAB
25(OH)D3 SERPL-MCNC: 89 NG/ML (ref 30–100)
ALBUMIN SERPL BCG-MCNC: 4.1 G/DL (ref 3.5–5)
ALP SERPL-CCNC: 43 U/L (ref 34–104)
ALT SERPL W P-5'-P-CCNC: 29 U/L (ref 7–52)
ANION GAP SERPL CALCULATED.3IONS-SCNC: 9 MMOL/L (ref 4–13)
AST SERPL W P-5'-P-CCNC: 24 U/L (ref 13–39)
BILIRUB SERPL-MCNC: 0.45 MG/DL (ref 0.2–1)
BUN SERPL-MCNC: 17 MG/DL (ref 5–25)
CALCIUM SERPL-MCNC: 9.2 MG/DL (ref 8.4–10.2)
CHLORIDE SERPL-SCNC: 105 MMOL/L (ref 96–108)
CHOLEST SERPL-MCNC: 171 MG/DL (ref ?–200)
CO2 SERPL-SCNC: 27 MMOL/L (ref 21–32)
CREAT SERPL-MCNC: 0.79 MG/DL (ref 0.6–1.3)
FERRITIN SERPL-MCNC: 27 NG/ML (ref 30–307)
FOLATE SERPL-MCNC: >22.3 NG/ML
GFR SERPL CREATININE-BSD FRML MDRD: 78 ML/MIN/1.73SQ M
GLUCOSE P FAST SERPL-MCNC: 85 MG/DL (ref 65–99)
HDLC SERPL-MCNC: 79 MG/DL
IRON SATN MFR SERPL: 27 % (ref 15–50)
IRON SERPL-MCNC: 103 UG/DL (ref 50–212)
LDLC SERPL CALC-MCNC: 76 MG/DL (ref 0–100)
POTASSIUM SERPL-SCNC: 4.9 MMOL/L (ref 3.5–5.3)
PROT SERPL-MCNC: 6.9 G/DL (ref 6.4–8.4)
PTH-INTACT SERPL-MCNC: 70 PG/ML (ref 12–88)
SODIUM SERPL-SCNC: 141 MMOL/L (ref 135–147)
T4 FREE SERPL-MCNC: 0.88 NG/DL (ref 0.61–1.12)
TIBC SERPL-MCNC: 375.2 UG/DL (ref 250–450)
TRANSFERRIN SERPL-MCNC: 268 MG/DL (ref 203–362)
TRIGL SERPL-MCNC: 78 MG/DL (ref ?–150)
TSH SERPL DL<=0.05 MIU/L-ACNC: 2.07 UIU/ML (ref 0.45–4.5)
UIBC SERPL-MCNC: 272 UG/DL (ref 155–355)
VIT B12 SERPL-MCNC: 1951 PG/ML (ref 180–914)

## 2025-07-11 PROCEDURE — 82306 VITAMIN D 25 HYDROXY: CPT

## 2025-07-11 PROCEDURE — 80053 COMPREHEN METABOLIC PANEL: CPT

## 2025-07-11 PROCEDURE — 82607 VITAMIN B-12: CPT

## 2025-07-11 PROCEDURE — 83970 ASSAY OF PARATHORMONE: CPT

## 2025-07-11 PROCEDURE — 83550 IRON BINDING TEST: CPT

## 2025-07-11 PROCEDURE — 84443 ASSAY THYROID STIM HORMONE: CPT

## 2025-07-11 PROCEDURE — 83540 ASSAY OF IRON: CPT

## 2025-07-11 PROCEDURE — 36415 COLL VENOUS BLD VENIPUNCTURE: CPT

## 2025-07-11 PROCEDURE — 82746 ASSAY OF FOLIC ACID SERUM: CPT

## 2025-07-11 PROCEDURE — 84425 ASSAY OF VITAMIN B-1: CPT

## 2025-07-11 PROCEDURE — 82728 ASSAY OF FERRITIN: CPT

## 2025-07-11 PROCEDURE — 84590 ASSAY OF VITAMIN A: CPT

## 2025-07-11 PROCEDURE — 80061 LIPID PANEL: CPT

## 2025-07-11 PROCEDURE — 84630 ASSAY OF ZINC: CPT

## 2025-07-11 PROCEDURE — 84439 ASSAY OF FREE THYROXINE: CPT

## 2025-07-12 ENCOUNTER — PATIENT MESSAGE (OUTPATIENT)
Dept: BARIATRICS | Facility: CLINIC | Age: 65
End: 2025-07-12

## 2025-07-14 ENCOUNTER — OFFICE VISIT (OUTPATIENT)
Dept: ENDOCRINOLOGY | Facility: CLINIC | Age: 65
End: 2025-07-14
Payer: MEDICARE

## 2025-07-14 VITALS
OXYGEN SATURATION: 99 % | SYSTOLIC BLOOD PRESSURE: 128 MMHG | HEART RATE: 64 BPM | WEIGHT: 141.4 LBS | BODY MASS INDEX: 27.76 KG/M2 | HEIGHT: 60 IN | DIASTOLIC BLOOD PRESSURE: 72 MMHG

## 2025-07-14 DIAGNOSIS — R13.10 DYSPHAGIA, UNSPECIFIED TYPE: ICD-10-CM

## 2025-07-14 DIAGNOSIS — E03.9 HYPOTHYROIDISM, UNSPECIFIED TYPE: Primary | ICD-10-CM

## 2025-07-14 DIAGNOSIS — E21.3 HYPERPARATHYROIDISM (HCC): ICD-10-CM

## 2025-07-14 DIAGNOSIS — E21.3 HYPERPARATHYROIDISM, UNSPECIFIED (HCC): ICD-10-CM

## 2025-07-14 LAB
VIT A SERPL-MCNC: 37.5 UG/DL (ref 22–69.5)
ZINC SERPL-MCNC: 78 UG/DL (ref 44–115)

## 2025-07-14 PROCEDURE — G2211 COMPLEX E/M VISIT ADD ON: HCPCS | Performed by: PHYSICIAN ASSISTANT

## 2025-07-14 PROCEDURE — 99214 OFFICE O/P EST MOD 30 MIN: CPT | Performed by: PHYSICIAN ASSISTANT

## 2025-07-14 RX ORDER — CHOLECALCIFEROL (VITAMIN D3) 25 MCG
2000 TABLET ORAL DAILY
Qty: 180 TABLET | Refills: 2 | Status: SHIPPED | OUTPATIENT
Start: 2025-07-14

## 2025-07-14 RX ORDER — LEVOTHYROXINE SODIUM 112 UG/1
112 TABLET ORAL DAILY
Qty: 90 TABLET | Refills: 2 | Status: SHIPPED | OUTPATIENT
Start: 2025-07-14

## 2025-07-14 NOTE — PROGRESS NOTES
Name: Dayanara Trujillo      : 1960      MRN: [id number]  Encounter Provider: Elena Bazzi PA-C  Encounter Date: 2025   Encounter department: [facility]    Chief Complaint   Patient presents with   • Hypothyroidism   :  Assessment & Plan  Hypothyroidism, unspecified type  TSH: 2.073  Maintained on levothyroxine 112 mcg daily, continue  Orders:  •  US thyroid; Future  •  TSH, 3rd generation with Free T4 reflex; Future  •  levothyroxine 112 mcg tablet; Take 1 tablet (112 mcg total) by mouth in the morning.    Hyperparathyroidism (HCC)  Secondary to malabsorption  Continue calcium and vitamin D supplementation  Vitamin D: 89.0  Calcium (corrected): 9.1  Orders:  •  Comprehensive metabolic panel; Future  •  Vitamin D 25 hydroxy; Future  •  cholecalciferol (VITAMIN D3) 1,000 units tablet; Take 2 tablets (2,000 Units total) by mouth in the morning.    Dysphagia, unspecified type    Orders:  •  US thyroid; Future    Hyperparathyroidism, unspecified (HCC)    Orders:  •  Calcium Carbonate 1500 (600 Ca) MG TABS; Take 1,500 mg by mouth 3 (three) times a day  •  cholecalciferol (VITAMIN D3) 1,000 units tablet; Take 2 tablets (2,000 Units total) by mouth in the morning.      Assessment & Plan        Pertinent Medical History   Patient has a past medical history of Alex-en-Y gastric bypass surgery in .  She has a subsequent hyperparathyroidism secondary to decreased absorption.  Patient also has a history of hypothyroidism and is maintained on levothyroxine.    History of Present Illness {?Quick Links Encounters * My Last Note * Last Note in Specialty * Snapshot * Since Last Visit * History :46070}  History of Present Illness    Dayanara Trujillo is a 65 y.o. female with a past medical history of secondary hyperparathyroidism, hypothyroidism who presents for follow-up.    Patient has been taking levothyroxine first thing in the morning with water, separate from food and  "other medications by at least one hour, and from supplements by at least 4 hours. Denies difficulty swallowing, breathing or changes in voice.  Patient is compliant with her calcium supplementation, taking 2 tabs 3 times per day.  We reviewed her labs, she does note that her iron and ferritin are once again low.  She typically follows with weight management/bariatric surgery for this as well as her vitamin deficiencies, encouraged follow-up.  She anticipates the need of resuming infusions.      Review of Systems as per HPI       Medical History Reviewed by provider this encounter:     .    Objective {?Quick Links Trend Vitals * Enter New Vitals * Results Review * Timeline (Adult) * Labs * Imaging * Cardiology * Procedures * Lung Cancer Screening * Surgical eConsent :33791}  /72   Pulse 64   Ht 5' (1.524 m)   Wt 64.1 kg (141 lb 6.4 oz)   SpO2 99%   BMI 27.62 kg/m²      Body mass index is 27.62 kg/m².  Wt Readings from Last 3 Encounters:   07/14/25 64.1 kg (141 lb 6.4 oz)   06/09/25 65.3 kg (144 lb)   05/12/25 65.1 kg (143 lb 9.6 oz)     Physical Exam  Vitals reviewed.   Constitutional:       General: She is not in acute distress.     Appearance: She is well-developed.   HENT:      Head: Normocephalic and atraumatic.     Eyes:      General: No scleral icterus.     Conjunctiva/sclera: Conjunctivae normal.     Neck:      Thyroid: No thyroid mass, thyromegaly or thyroid tenderness.   Pulmonary:      Effort: Pulmonary effort is normal.     Neurological:      Mental Status: She is alert.     Psychiatric:         Attention and Perception: Attention normal.       Physical Exam      Results    Labs: I have reviewed pertinent labs including: No results found for: \"HGBA1C\"   Lab Results   Component Value Date    CREATININE 0.79 07/11/2025    CREATININE 0.74 01/03/2025    CREATININE 0.80 04/14/2024    BUN 17 07/11/2025     02/16/2015    K 4.9 07/11/2025     07/11/2025    CO2 27 07/11/2025      GFR, " Calculated   Date Value Ref Range Status   08/13/2019 98 >60 mL/min/1.73m2 Final     Comment:     mL/min per 1.73 square meters                                            Normal Function or Mild Renal    Disease (if clinically at risk):  >or=60  Moderately Decreased:                30-59  Severely Decreased:                  15-29  Renal Failure:                         <15                                            -American GFR: multiply reported GFR by 1.16    Please note that the eGFR is based on the CKD-EPI calculation, and is not intended to be used for drug dosing.                                            Note: Calculated GFR may not be an accurate indicator of renal function if the patient's renal function is not in a steady state.    Ordering Provider: ELISSA LEAVITT  Report Copied to : NANCI ANDRADE     eGFRcr   Date Value Ref Range Status   11/15/2022 79 >59 Final     eGFR   Date Value Ref Range Status   07/11/2025 78 ml/min/1.73sq m Final      Cholesterol   Date Value Ref Range Status   02/16/2015 171 mg/dL Final     Comment:     CHOLESTEROL:       Desirable           <200 mg/dl       Borderline High     200-239 mg/dl       High                   >239 mg/dl  ____________________________________       HDL   Date Value Ref Range Status   02/16/2015 77 mg/dL Final     Comment:     HDL:       High       >59 mg/dl       Low        <41 mg/dl  ______________________________       HDL, Direct   Date Value Ref Range Status   07/11/2025 79 >=50 mg/dL Final     Comment:     HDL Cholesterol:       Low     <41 mg/dL     Triglycerides   Date Value Ref Range Status   07/11/2025 78 See Comment mg/dL Final     Comment:     Triglyceride:     0-9Y            <75mg/dL     10Y-17Y         <90 mg/dL       >=18Y     Normal          <150 mg/dL     Borderline High 150-199 mg/dL     High            200-499 mg/dL        Very High       >499 mg/dL    Specimen collection should occur prior to Metamizole administration due  to the potential for falsely depressed results.   02/16/2015 97 mg/dL Final     Comment:     TRIGLYCERIDE:       Normal                 <150 mg/dl       Borderline High       150-199 mg/dl       High                  200-499 mg/dl       Very High             >499 mg/dl  _______________________________________        ALT   Date Value Ref Range Status   07/11/2025 29 7 - 52 U/L Final     Comment:     Specimen collection should occur prior to Sulfasalazine administration due to the potential for falsely depressed results.    11/15/2022 25 <56 U/L Final     AST   Date Value Ref Range Status   07/11/2025 24 13 - 39 U/L Final   11/15/2022 22 <41 U/L Final     Alkaline Phosphatase   Date Value Ref Range Status   07/11/2025 43 34 - 104 U/L Final   11/15/2022 68 35 - 120 U/L Final     Total Bilirubin   Date Value Ref Range Status   02/16/2015 0.36 0.20 - 1.00 mg/dL Final      Lab Results   Component Value Date    WOY1BOOGGZMU 2.073 07/11/2025      Lab Results   Component Value Date    FREET4 0.88 07/11/2025      Lab Results   Component Value Date    [id number] 89.0 07/11/2025    [id number] 68.1 01/03/2025    [id number] 61.3 04/14/2024        There are no Patient Instructions on file for this visit.    Discussed with the patient and all questioned fully answered. She will call me if any problems arise.

## 2025-07-14 NOTE — ASSESSMENT & PLAN NOTE
Secondary to malabsorption  Continue calcium and vitamin D supplementation  Vitamin D: 89.0  Calcium (corrected): 9.1  Orders:  •  Comprehensive metabolic panel; Future  •  Vitamin D 25 hydroxy; Future  •  cholecalciferol (VITAMIN D3) 1,000 units tablet; Take 2 tablets (2,000 Units total) by mouth in the morning.

## 2025-07-14 NOTE — ASSESSMENT & PLAN NOTE
TSH: 2.073  Maintained on levothyroxine 112 mcg daily, continue  Orders:  •  US thyroid; Future  •  TSH, 3rd generation with Free T4 reflex; Future  •  levothyroxine 112 mcg tablet; Take 1 tablet (112 mcg total) by mouth in the morning.

## 2025-07-15 DIAGNOSIS — E61.1 IRON DEFICIENCY: Primary | ICD-10-CM

## 2025-07-15 LAB — VIT B1 BLD-SCNC: 207.7 NMOL/L (ref 66.5–200)

## 2025-07-15 RX ORDER — SODIUM CHLORIDE 9 MG/ML
20 INJECTION, SOLUTION INTRAVENOUS ONCE
Status: CANCELLED | OUTPATIENT
Start: 2025-07-28

## 2025-07-17 DIAGNOSIS — Z86.39 HISTORY OF OBESITY: ICD-10-CM

## 2025-07-17 RX ORDER — BUPROPION HYDROCHLORIDE 100 MG/1
100 TABLET ORAL 3 TIMES DAILY
Qty: 270 TABLET | Refills: 1 | Status: SHIPPED | OUTPATIENT
Start: 2025-07-17

## 2025-07-18 ENCOUNTER — TELEPHONE (OUTPATIENT)
Dept: BARIATRICS | Facility: CLINIC | Age: 65
End: 2025-07-18

## 2025-07-18 NOTE — PATIENT COMMUNICATION
Patient called in to speak to the office regarding scheduling her iron infusions. Warm transferred patient accordingly.

## 2025-07-21 ENCOUNTER — HOSPITAL ENCOUNTER (OUTPATIENT)
Dept: RADIOLOGY | Facility: HOSPITAL | Age: 65
Discharge: HOME | End: 2025-07-21
Payer: MEDICARE

## 2025-07-21 DIAGNOSIS — R13.10 DYSPHAGIA, UNSPECIFIED TYPE: ICD-10-CM

## 2025-07-21 DIAGNOSIS — E03.9 HYPOTHYROIDISM, UNSPECIFIED TYPE: ICD-10-CM

## 2025-07-21 PROCEDURE — 76536 US EXAM OF HEAD AND NECK: CPT

## 2025-07-26 DIAGNOSIS — Z86.39 HISTORY OF OBESITY: ICD-10-CM

## 2025-07-26 DIAGNOSIS — R63.8 ABNORMAL CRAVING: ICD-10-CM

## 2025-07-28 ENCOUNTER — LAB REQUISITION (OUTPATIENT)
Dept: LAB | Facility: HOSPITAL | Age: 65
End: 2025-07-28
Payer: MEDICARE

## 2025-07-28 ENCOUNTER — HOSPITAL ENCOUNTER (OUTPATIENT)
Dept: INFUSION CENTER | Facility: HOSPITAL | Age: 65
Discharge: HOME | End: 2025-07-28
Attending: SURGERY
Payer: MEDICARE

## 2025-07-28 VITALS
SYSTOLIC BLOOD PRESSURE: 129 MMHG | DIASTOLIC BLOOD PRESSURE: 72 MMHG | RESPIRATION RATE: 18 BRPM | TEMPERATURE: 96.7 F | HEART RATE: 81 BPM

## 2025-07-28 DIAGNOSIS — A69.20 LYME DISEASE, UNSPECIFIED: ICD-10-CM

## 2025-07-28 DIAGNOSIS — E61.1 IRON DEFICIENCY: Primary | ICD-10-CM

## 2025-07-28 PROCEDURE — 96365 THER/PROPH/DIAG IV INF INIT: CPT

## 2025-07-28 PROCEDURE — 86618 LYME DISEASE ANTIBODY: CPT | Performed by: INTERNAL MEDICINE

## 2025-07-28 RX ORDER — SODIUM CHLORIDE 9 MG/ML
20 INJECTION, SOLUTION INTRAVENOUS ONCE
Status: COMPLETED | OUTPATIENT
Start: 2025-07-28 | End: 2025-07-28

## 2025-07-28 RX ORDER — NALTREXONE HYDROCHLORIDE 50 MG/1
25 TABLET, FILM COATED ORAL 2 TIMES DAILY
Qty: 30 TABLET | Refills: 5 | Status: SHIPPED | OUTPATIENT
Start: 2025-07-28

## 2025-07-28 RX ORDER — SODIUM CHLORIDE 9 MG/ML
20 INJECTION, SOLUTION INTRAVENOUS ONCE
Status: CANCELLED | OUTPATIENT
Start: 2025-08-04

## 2025-07-28 RX ADMIN — IRON SUCROSE 200 MG: 20 INJECTION, SOLUTION INTRAVENOUS at 08:44

## 2025-07-28 RX ADMIN — SODIUM CHLORIDE 20 ML/HR: 9 INJECTION, SOLUTION INTRAVENOUS at 08:44

## 2025-07-29 LAB — B BURGDOR IGG+IGM SER QL IA: NEGATIVE

## 2025-08-04 ENCOUNTER — HOSPITAL ENCOUNTER (OUTPATIENT)
Dept: INFUSION CENTER | Facility: HOSPITAL | Age: 65
Discharge: HOME | End: 2025-08-04
Attending: SURGERY
Payer: MEDICARE

## 2025-08-04 VITALS
SYSTOLIC BLOOD PRESSURE: 137 MMHG | TEMPERATURE: 97.8 F | DIASTOLIC BLOOD PRESSURE: 74 MMHG | RESPIRATION RATE: 18 BRPM | HEART RATE: 80 BPM

## 2025-08-04 DIAGNOSIS — E61.1 IRON DEFICIENCY: Primary | ICD-10-CM

## 2025-08-04 PROCEDURE — 96365 THER/PROPH/DIAG IV INF INIT: CPT

## 2025-08-04 RX ORDER — SODIUM CHLORIDE 9 MG/ML
20 INJECTION, SOLUTION INTRAVENOUS ONCE
Status: COMPLETED | OUTPATIENT
Start: 2025-08-04 | End: 2025-08-04

## 2025-08-04 RX ORDER — SODIUM CHLORIDE 9 MG/ML
20 INJECTION, SOLUTION INTRAVENOUS ONCE
Status: CANCELLED | OUTPATIENT
Start: 2025-08-11

## 2025-08-04 RX ADMIN — IRON SUCROSE 200 MG: 20 INJECTION, SOLUTION INTRAVENOUS at 08:42

## 2025-08-04 RX ADMIN — SODIUM CHLORIDE 20 ML/HR: 9 INJECTION, SOLUTION INTRAVENOUS at 08:42

## 2025-08-11 ENCOUNTER — HOSPITAL ENCOUNTER (OUTPATIENT)
Dept: INFUSION CENTER | Facility: HOSPITAL | Age: 65
Discharge: HOME | End: 2025-08-11
Attending: SURGERY
Payer: MEDICARE

## 2025-08-18 ENCOUNTER — HOSPITAL ENCOUNTER (OUTPATIENT)
Dept: INFUSION CENTER | Facility: HOSPITAL | Age: 65
Discharge: HOME | End: 2025-08-18
Attending: SURGERY
Payer: MEDICARE

## 2025-08-18 VITALS
TEMPERATURE: 96.4 F | DIASTOLIC BLOOD PRESSURE: 81 MMHG | RESPIRATION RATE: 18 BRPM | SYSTOLIC BLOOD PRESSURE: 139 MMHG | HEART RATE: 73 BPM

## 2025-08-18 DIAGNOSIS — E61.1 IRON DEFICIENCY: Primary | ICD-10-CM

## 2025-08-18 PROCEDURE — 96365 THER/PROPH/DIAG IV INF INIT: CPT

## 2025-08-18 RX ORDER — SODIUM CHLORIDE 9 MG/ML
20 INJECTION, SOLUTION INTRAVENOUS ONCE
Status: COMPLETED | OUTPATIENT
Start: 2025-08-18 | End: 2025-08-18

## 2025-08-18 RX ORDER — SODIUM CHLORIDE 9 MG/ML
20 INJECTION, SOLUTION INTRAVENOUS ONCE
Status: CANCELLED | OUTPATIENT
Start: 2025-08-25

## 2025-08-18 RX ADMIN — IRON SUCROSE 200 MG: 20 INJECTION, SOLUTION INTRAVENOUS at 09:11

## 2025-08-18 RX ADMIN — SODIUM CHLORIDE 20 ML/HR: 9 INJECTION, SOLUTION INTRAVENOUS at 09:11
